# Patient Record
Sex: FEMALE | Race: WHITE | Employment: OTHER | URBAN - METROPOLITAN AREA
[De-identification: names, ages, dates, MRNs, and addresses within clinical notes are randomized per-mention and may not be internally consistent; named-entity substitution may affect disease eponyms.]

---

## 2019-04-10 ENCOUNTER — OFFICE VISIT (OUTPATIENT)
Dept: ORTHOPEDIC SURGERY | Age: 84
End: 2019-04-10
Payer: MEDICARE

## 2019-04-10 VITALS — RESPIRATION RATE: 15 BRPM | HEIGHT: 65 IN | BODY MASS INDEX: 26.99 KG/M2 | WEIGHT: 162 LBS

## 2019-04-10 DIAGNOSIS — M79.642 PAIN IN BOTH HANDS: Primary | ICD-10-CM

## 2019-04-10 DIAGNOSIS — M79.641 PAIN IN BOTH HANDS: Primary | ICD-10-CM

## 2019-04-10 DIAGNOSIS — R20.0 BILATERAL HAND NUMBNESS: ICD-10-CM

## 2019-04-10 PROCEDURE — L3908 WHO COCK-UP NONMOLDE PRE OTS: HCPCS | Performed by: ORTHOPAEDIC SURGERY

## 2019-04-10 PROCEDURE — 1123F ACP DISCUSS/DSCN MKR DOCD: CPT | Performed by: ORTHOPAEDIC SURGERY

## 2019-04-10 PROCEDURE — 99203 OFFICE O/P NEW LOW 30 MIN: CPT | Performed by: ORTHOPAEDIC SURGERY

## 2019-04-10 PROCEDURE — 1090F PRES/ABSN URINE INCON ASSESS: CPT | Performed by: ORTHOPAEDIC SURGERY

## 2019-04-10 PROCEDURE — G8400 PT W/DXA NO RESULTS DOC: HCPCS | Performed by: ORTHOPAEDIC SURGERY

## 2019-04-10 PROCEDURE — G8419 CALC BMI OUT NRM PARAM NOF/U: HCPCS | Performed by: ORTHOPAEDIC SURGERY

## 2019-04-10 PROCEDURE — 4004F PT TOBACCO SCREEN RCVD TLK: CPT | Performed by: ORTHOPAEDIC SURGERY

## 2019-04-10 PROCEDURE — 4040F PNEUMOC VAC/ADMIN/RCVD: CPT | Performed by: ORTHOPAEDIC SURGERY

## 2019-04-10 PROCEDURE — G8427 DOCREV CUR MEDS BY ELIG CLIN: HCPCS | Performed by: ORTHOPAEDIC SURGERY

## 2019-04-10 RX ORDER — ROSUVASTATIN CALCIUM 20 MG/1
TABLET, COATED ORAL
Refills: 3 | COMMUNITY
Start: 2019-03-27

## 2019-04-10 RX ORDER — TIZANIDINE 2 MG/1
TABLET ORAL
Refills: 0 | COMMUNITY
Start: 2019-03-20 | End: 2021-05-10

## 2019-04-10 RX ORDER — OLMESARTAN MEDOXOMIL, AMLODIPINE AND HYDROCHLOROTHIAZIDE TABLET 40/5/25 MG 40; 5; 25 MG/1; MG/1; MG/1
TABLET ORAL DAILY
COMMUNITY
Start: 2019-04-07

## 2019-04-10 RX ORDER — AMOXICILLIN AND CLAVULANATE POTASSIUM 250; 62.5 MG/5ML; MG/5ML
POWDER, FOR SUSPENSION ORAL
COMMUNITY
End: 2019-09-25

## 2019-04-10 RX ORDER — CETIRIZINE HYDROCHLORIDE 10 MG/1
TABLET ORAL
COMMUNITY

## 2019-04-10 RX ORDER — PRAVASTATIN SODIUM 80 MG/1
80 TABLET ORAL
COMMUNITY
Start: 2014-02-18 | End: 2021-05-10

## 2019-04-10 RX ORDER — CYCLOSPORINE 0.5 MG/ML
EMULSION OPHTHALMIC
Refills: 5 | COMMUNITY
Start: 2019-03-15 | End: 2021-05-10

## 2019-04-10 RX ORDER — RANITIDINE 150 MG/1
TABLET ORAL
COMMUNITY
Start: 2017-11-29 | End: 2021-05-10

## 2019-04-19 ENCOUNTER — OFFICE VISIT (OUTPATIENT)
Dept: ORTHOPEDIC SURGERY | Age: 84
End: 2019-04-19
Payer: MEDICARE

## 2019-04-19 DIAGNOSIS — G56.03 BILATERAL CARPAL TUNNEL SYNDROME: Primary | ICD-10-CM

## 2019-04-19 DIAGNOSIS — G56.23 ULNAR NEUROPATHY OF BOTH UPPER EXTREMITIES: ICD-10-CM

## 2019-04-19 PROCEDURE — 99999 PR OFFICE/OUTPT VISIT,PROCEDURE ONLY: CPT | Performed by: PHYSICAL MEDICINE & REHABILITATION

## 2019-04-19 PROCEDURE — 95886 MUSC TEST DONE W/N TEST COMP: CPT | Performed by: PHYSICAL MEDICINE & REHABILITATION

## 2019-04-19 PROCEDURE — 95910 NRV CNDJ TEST 7-8 STUDIES: CPT | Performed by: PHYSICAL MEDICINE & REHABILITATION

## 2019-04-19 NOTE — PROGRESS NOTES
[address] MEDICINE      Patient: [place] Age: 81 Years 7 Months  Sex: Female Date: 4/19/19  YOB: 1935 Ref. Phys.: Dr. Joy Bean  Notes:  r/o bilateral CTS, R>L n/t hands      Sensory NCS      Nerve / Sites Peak PeakAmp Dist Abdelrahman    ms µV cm m/s   L MEDIAN - D2 ULNAR D5   1. Median Wrist 3.70 18.6 14 50.9   2. Ulnar Wrist 3.70 15.1 14 51.9   R MEDIAN - D2 ULNAR D5   1. Median Wrist 4.05 14.0 14 47.5   2. Ulnar Wrist 3.85 15.6 14 41.2       Motor NCS      Nerve / Sites Lat Amp Amp Dist Abdelrahman    ms mV % cm m/s   L MEDIAN - APB   1. Wrist 3.30 5.5 100 8    2. Elbow 7.80 4.9 89.4 25 55.6   R MEDIAN - APB   1. Wrist 3.45 9.0 100 8    2. Elbow 7.90 8.7 97.2 26 58.4   L ULNAR - ADM   1. Wrist 3.10 8.5 100 8    2. B. Elbow 6.85 8.9 104 21 56.0   3. A. Elbow 8.55 8.8 103 10 58.8   R ULNAR - ADM   1. Wrist 3.10 11.1 100 8    2. B. Elbow 6.65 10.6 95.5 22 62.0   3. A. Elbow 9.10 9.4 84.6 10 40.8       EMG Summary Table     Spontaneous MUAP Recruit. Ins. Act Fibs. PSW Fasics. H.F. Amp. Dur. Poly's. Pattern   L. FIRST D INTEROSS N None None None None N N N N   R. FIRST D INTEROSS N None None None None N N N N   R. ABD POLL BREVIS N None None None None + + N N   R. BICEPS N None None None None N N N N   R. TRICEPS N None None None None N N N N   R. EXT CARPI R BREV N None None None None N N N N   R. EXT DIG COMM N None None None None N N N N   R. PRON TERES N None None None None N N N N   L. BICEPS N None None None None N N N N   L. TRICEPS N None None None None N N N N   L. EXT CARPI R BREV N None None None None N N N N   L. EXT DIG COMM N None None None None N N N N   L. PRON TERES N None None None None N N N N       Summary: Right median SNAP latency is slowed across the wrist.  Right ulnar CMAP conduction velocity is slowed across the elbow. Monopolar exam shows large amplitude prolonged duration motor units to right APB.   The remainder of the nerve conduction studies and monopolar exam are normal, as recorded above. Impression: Abnormal examination. There is electrodiagnostic evidence of:    1. Mild chronic right median mononeuropathy around the wrist (carpal tunnel syndrome)  2. 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 No left median mononeuropathy around the wrist (carpal tunnel syndrome)  4.  No evidence of any other left or right upper extremity mononeuropathy, plexopathy, or radiculopathy            Kasandra Gonzalez MD

## 2019-04-29 ENCOUNTER — OFFICE VISIT (OUTPATIENT)
Dept: ORTHOPEDIC SURGERY | Age: 84
End: 2019-04-29
Payer: MEDICARE

## 2019-04-29 VITALS — BODY MASS INDEX: 27.32 KG/M2 | HEIGHT: 65 IN | WEIGHT: 164 LBS | RESPIRATION RATE: 16 BRPM

## 2019-04-29 DIAGNOSIS — G56.01 CARPAL TUNNEL SYNDROME ON RIGHT: Primary | ICD-10-CM

## 2019-04-29 DIAGNOSIS — G56.02 CARPAL TUNNEL SYNDROME ON LEFT: ICD-10-CM

## 2019-04-29 DIAGNOSIS — G56.23 ULNAR NEUROPATHY OF BOTH UPPER EXTREMITIES: ICD-10-CM

## 2019-04-29 PROCEDURE — 4040F PNEUMOC VAC/ADMIN/RCVD: CPT | Performed by: ORTHOPAEDIC SURGERY

## 2019-04-29 PROCEDURE — 1090F PRES/ABSN URINE INCON ASSESS: CPT | Performed by: ORTHOPAEDIC SURGERY

## 2019-04-29 PROCEDURE — G8419 CALC BMI OUT NRM PARAM NOF/U: HCPCS | Performed by: ORTHOPAEDIC SURGERY

## 2019-04-29 PROCEDURE — 1123F ACP DISCUSS/DSCN MKR DOCD: CPT | Performed by: ORTHOPAEDIC SURGERY

## 2019-04-29 PROCEDURE — G8427 DOCREV CUR MEDS BY ELIG CLIN: HCPCS | Performed by: ORTHOPAEDIC SURGERY

## 2019-04-29 PROCEDURE — 20526 THER INJECTION CARP TUNNEL: CPT | Performed by: ORTHOPAEDIC SURGERY

## 2019-04-29 PROCEDURE — 99213 OFFICE O/P EST LOW 20 MIN: CPT | Performed by: ORTHOPAEDIC SURGERY

## 2019-04-29 PROCEDURE — 4004F PT TOBACCO SCREEN RCVD TLK: CPT | Performed by: ORTHOPAEDIC SURGERY

## 2019-04-29 PROCEDURE — G8400 PT W/DXA NO RESULTS DOC: HCPCS | Performed by: ORTHOPAEDIC SURGERY

## 2019-04-29 NOTE — PROGRESS NOTES
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 On the left side her EMGs are still normal    Treatment Plan: Both diagnostically as well as therapeutically we have elected to give her steroid injection into her right carpal tunnel today and she is going to keep track of how much this helps her symptoms    Return in about 1 month (around 5/27/2019). History of Present Illness  Stanton Salcedo is a 81 y.o. female. Location:  Bilateral hand numbness right significantly greater than left Severity: Particularly at night she wakes up with numbness Duration: 2-3 months  Modifying factors: Wearing the brace helps a little bit Associated symptoms: She does have known polyneuropathy    Review of Systems  Pertinent items are noted in HPI  Denies fever chills, confusion and bowel and bladder active change  Complete Review of Systems reviewed from patient history form dated 4/10/19 and available in the patients chart under the media tab. Vital Signs  Vitals:    04/29/19 0950   Resp: 16   Weight: 164 lb (74.4 kg)   Height: 5' 5\" (1.651 m)     Body mass index is 27.29 kg/m². Medical History  No past medical history on file. Current Outpatient Medications on File Prior to Visit   Medication Sig Dispense Refill    tiZANidine (ZANAFLEX) 2 MG tablet TK 1 T PO BID PRN  0    rosuvastatin (CRESTOR) 20 MG tablet TK 1 T PO QPM  3    ranitidine (ZANTAC) 150 MG tablet ranitidine 150 mg tablet   Take 1 tablet every day by oral route.  pravastatin (PRAVACHOL) 80 MG tablet Take 80 mg by mouth      olmesartan-amLODIPine-HCTZ 40-5-25 MG TABS       RESTASIS 0.05 % ophthalmic emulsion INT 1 GTT IN OU BID  5    cetirizine (ZYRTEC ALLERGY) 10 MG tablet Zyrtec 10 mg tablet   Take 1 tablet every day by oral route.  Ascorbic Acid 300 MG CHEW Vitamin C 300 mg chewable tablet   Take 1 tablet every day by oral route.  amoxicillin-clavulanate (AUGMENTIN) 250-62.5 MG/5ML suspension amoxicillin 250 mg-potassium clavulanate 62.5 mg/5 mL oral suspension   TAKE 25 ML BY ORAL ROUTE ONCE. DISCARD REMAINDER       No current facility-administered medications on file prior to visit. Allergies   Allergen Reactions    Atenolol      Stomach discomfort    Bisoprolol      Stomach problems    Erythromycin      Pt doesn't remember    Levofloxacin      Can't remember    Penicillins     Sulfa Antibiotics      Pt doesn't remember      Verapamil      Can't remember     [unfilled]  No family history on file. Physical Exam  Constitutional:  Patient is well-nourished and demonstrates normal hygiene. Mental Status:  Patient is alert and oriented to person, place and time. Skin:  Intact, no rashes or lesions. Lymphatic: No supraclavicular or cervical adenopathy. Hand Examination: Right neurologic exam:Tinel sign is negative in the supraclavicular fossa, mid brachium, cubital tunnel, pronator teres regions. It is positive in the carpal tunnel and Phalen's test is positive. Left neurologic exam: Mildly positive Tinel's and Phalen's at the wrist level. Additional Comments:     Additional Examinations:  X-Ray Findings:    Additional Diagnostic Test Findings: EMG nerve conduction studies were reviewed and they do document mild right carpal tunnel syndrome as well as mild right ulnar neuropathy on the left they are normal    Office Procedures: The right carpal tunnel was injected today with 1-1/2 mL of Celestone and Xylocaine. The wrist was prepped with alcohol and under sterile technique an injection was made ulnar to the palmaris longus tendon just proximal to the wrist flexor crease.   Patient tolerated the procedure well      I spent 15 minutes, face to face, and greater than 50% was spent with the patient discussing treatment options and answering questions regarding carpal tunnel syndrome pathology and its superimposition upon    This dictation was performed with a verbal recognition program. It is possible that there are still dictated errors within this office note. All efforts were made to ensure that this office note is accurate.     Orders Placed This Encounter   Procedures    RI INJECT CARPAL TUNNEL    RI BETAMETHASONE ACET&SOD PHOSP

## 2019-05-29 ENCOUNTER — OFFICE VISIT (OUTPATIENT)
Dept: ORTHOPEDIC SURGERY | Age: 84
End: 2019-05-29
Payer: MEDICARE

## 2019-05-29 VITALS — HEIGHT: 65 IN | RESPIRATION RATE: 15 BRPM | BODY MASS INDEX: 27.33 KG/M2 | WEIGHT: 164.02 LBS

## 2019-05-29 DIAGNOSIS — G56.01 CARPAL TUNNEL SYNDROME ON RIGHT: ICD-10-CM

## 2019-05-29 DIAGNOSIS — G56.23 ULNAR NEUROPATHY OF BOTH UPPER EXTREMITIES: Primary | ICD-10-CM

## 2019-05-29 PROBLEM — G89.29 CHRONIC HEADACHES: Status: ACTIVE | Noted: 2019-05-29

## 2019-05-29 PROBLEM — R51.9 CHRONIC HEADACHES: Status: ACTIVE | Noted: 2019-05-29

## 2019-05-29 PROBLEM — E78.00 HYPERCHOLESTEROLEMIA: Status: ACTIVE | Noted: 2019-05-29

## 2019-05-29 PROBLEM — I10 HYPERTENSION: Status: ACTIVE | Noted: 2019-05-29

## 2019-05-29 PROBLEM — K21.9 GERD (GASTROESOPHAGEAL REFLUX DISEASE): Status: ACTIVE | Noted: 2019-05-29

## 2019-05-29 PROCEDURE — 4004F PT TOBACCO SCREEN RCVD TLK: CPT | Performed by: ORTHOPAEDIC SURGERY

## 2019-05-29 PROCEDURE — G8599 NO ASA/ANTIPLAT THER USE RNG: HCPCS | Performed by: ORTHOPAEDIC SURGERY

## 2019-05-29 PROCEDURE — G8419 CALC BMI OUT NRM PARAM NOF/U: HCPCS | Performed by: ORTHOPAEDIC SURGERY

## 2019-05-29 PROCEDURE — G8427 DOCREV CUR MEDS BY ELIG CLIN: HCPCS | Performed by: ORTHOPAEDIC SURGERY

## 2019-05-29 PROCEDURE — 1123F ACP DISCUSS/DSCN MKR DOCD: CPT | Performed by: ORTHOPAEDIC SURGERY

## 2019-05-29 PROCEDURE — G8400 PT W/DXA NO RESULTS DOC: HCPCS | Performed by: ORTHOPAEDIC SURGERY

## 2019-05-29 PROCEDURE — 99212 OFFICE O/P EST SF 10 MIN: CPT | Performed by: ORTHOPAEDIC SURGERY

## 2019-05-29 PROCEDURE — 4040F PNEUMOC VAC/ADMIN/RCVD: CPT | Performed by: ORTHOPAEDIC SURGERY

## 2019-05-29 PROCEDURE — 1090F PRES/ABSN URINE INCON ASSESS: CPT | Performed by: ORTHOPAEDIC SURGERY

## 2019-05-29 NOTE — PROGRESS NOTES
Assessment: Nearly 100% resolution of symptoms after steroid injection and brace for carpal tunnel syndrome. Her EMGs did show combination of carpal tunnel ulnar neuropathy and peripheral neuropathy and so the injection was given partly to see how much improvement she would get    Treatment Plan: Injection would be a strong indicator that surgery would eliminate the vast majority of her symptoms    Return if symptoms worsen or fail to improve. History of Present Illness  Stanton Salcedo is a 81 y.o. female. Location:  RT HAND CTS Severity: NO SYMPTOMS SINCE THE INJECTION Duration: SEVERAL YEARS  Modifying factors: STEROID INJECTION ON 4/29/19  Associated symptoms: NUMBNESS/TINGLING. INJECTIONS HAS HELPED IMPROVE HER SYMPTOMS %. Review of Systems  Pertinent items are noted in HPI  Denies fever chills, confusion and bowel and bladder active change  Complete Review of Systems reviewed from patient history form dated 4/10/2019 and available in the patients chart under the media tab. Vital Signs  Vitals:    05/29/19 1008   Resp: 15   Weight: 164 lb 0.4 oz (74.4 kg)   Height: 5' 5\" (1.651 m)     Body mass index is 27.29 kg/m². Contributory History  NONE    Medical History  History reviewed. No pertinent past medical history. Current Outpatient Medications on File Prior to Visit   Medication Sig Dispense Refill    nystatin (MYCOSTATIN) 791428 UNIT/ML suspension Take 5 mLs by mouth      tiZANidine (ZANAFLEX) 2 MG tablet TK 1 T PO BID PRN  0    rosuvastatin (CRESTOR) 20 MG tablet TK 1 T PO QPM  3    ranitidine (ZANTAC) 150 MG tablet ranitidine 150 mg tablet   Take 1 tablet every day by oral route.  pravastatin (PRAVACHOL) 80 MG tablet Take 80 mg by mouth      olmesartan-amLODIPine-HCTZ 40-5-25 MG TABS       RESTASIS 0.05 % ophthalmic emulsion INT 1 GTT IN OU BID  5    cetirizine (ZYRTEC ALLERGY) 10 MG tablet Zyrtec 10 mg tablet   Take 1 tablet every day by oral route.       Ascorbic Acid 300 MG CHEW Vitamin C 300 mg chewable tablet   Take 1 tablet every day by oral route.  amoxicillin-clavulanate (AUGMENTIN) 250-62.5 MG/5ML suspension amoxicillin 250 mg-potassium clavulanate 62.5 mg/5 mL oral suspension   TAKE 25 ML BY ORAL ROUTE ONCE. DISCARD REMAINDER       No current facility-administered medications on file prior to visit.       Allergies   Allergen Reactions    Atenolol      Stomach discomfort    Bisoprolol      Stomach problems    Erythromycin      Pt doesn't remember    Levofloxacin      Can't remember    Penicillins     Sulfa Antibiotics      Pt doesn't remember      Verapamil      Can't remember     Social History     Socioeconomic History    Marital status:      Spouse name: Not on file    Number of children: Not on file    Years of education: Not on file    Highest education level: Not on file   Occupational History    Not on file   Social Needs    Financial resource strain: Not on file    Food insecurity:     Worry: Not on file     Inability: Not on file    Transportation needs:     Medical: Not on file     Non-medical: Not on file   Tobacco Use    Smoking status: Never Smoker    Smokeless tobacco: Never Used   Substance and Sexual Activity    Alcohol use: Not on file    Drug use: Not on file    Sexual activity: Not on file   Lifestyle    Physical activity:     Days per week: Not on file     Minutes per session: Not on file    Stress: Not on file   Relationships    Social connections:     Talks on phone: Not on file     Gets together: Not on file     Attends Nondenominational service: Not on file     Active member of club or organization: Not on file     Attends meetings of clubs or organizations: Not on file     Relationship status: Not on file    Intimate partner violence:     Fear of current or ex partner: Not on file     Emotionally abused: Not on file     Physically abused: Not on file     Forced sexual activity: Not on file   Other Topics Concern    Not on file   Social History Narrative    Not on file     History reviewed. No pertinent family history. Physical Exam  Constitutional:  Normal nutrition  Mental Status:  Normal without any dementia  Skin:  No rashes or erythema  Lymphatic: No lymphadenopathy    Hand Examination: Examination of the right hand now shows just very minimally positive Phalen's negative Tinel's sign over the median nerve at the carpal tunnel. Minimally positive Tinel sign over the ulnar nerve at the cubital tunnel. Ulnar intrinsic motor function is normal.    Median intrinsic motor function is normal    Additional Comments:     Additional Examinations:  X-Ray Findings:    Additional Diagnostic Test Findings:    Office Procedures:    I spent 10 minutes, face to face, and greater than 50% was spent with the patient discussing treatment options and answering questions regarding operative indications for carpal tunnel syndrome    [unfilled]    No orders of the defined types were placed in this encounter. Attestation: I have reviewed the chief complaint and history of present illness (including ROS and PFSH) and vital documentation by my staff and I agree with their documentation and have added where applicable.

## 2019-09-18 ENCOUNTER — OFFICE VISIT (OUTPATIENT)
Dept: ORTHOPEDIC SURGERY | Age: 84
End: 2019-09-18
Payer: MEDICARE

## 2019-09-18 VITALS — BODY MASS INDEX: 27.33 KG/M2 | HEIGHT: 65 IN | WEIGHT: 164.02 LBS | RESPIRATION RATE: 15 BRPM

## 2019-09-18 DIAGNOSIS — G56.23 ULNAR NEUROPATHY OF BOTH UPPER EXTREMITIES: Primary | ICD-10-CM

## 2019-09-18 DIAGNOSIS — G56.01 CARPAL TUNNEL SYNDROME ON RIGHT: ICD-10-CM

## 2019-09-18 PROCEDURE — 1036F TOBACCO NON-USER: CPT | Performed by: ORTHOPAEDIC SURGERY

## 2019-09-18 PROCEDURE — G8419 CALC BMI OUT NRM PARAM NOF/U: HCPCS | Performed by: ORTHOPAEDIC SURGERY

## 2019-09-18 PROCEDURE — 4040F PNEUMOC VAC/ADMIN/RCVD: CPT | Performed by: ORTHOPAEDIC SURGERY

## 2019-09-18 PROCEDURE — 99213 OFFICE O/P EST LOW 20 MIN: CPT | Performed by: ORTHOPAEDIC SURGERY

## 2019-09-18 PROCEDURE — 1123F ACP DISCUSS/DSCN MKR DOCD: CPT | Performed by: ORTHOPAEDIC SURGERY

## 2019-09-18 PROCEDURE — G8427 DOCREV CUR MEDS BY ELIG CLIN: HCPCS | Performed by: ORTHOPAEDIC SURGERY

## 2019-09-18 PROCEDURE — 1090F PRES/ABSN URINE INCON ASSESS: CPT | Performed by: ORTHOPAEDIC SURGERY

## 2019-09-18 PROCEDURE — G8599 NO ASA/ANTIPLAT THER USE RNG: HCPCS | Performed by: ORTHOPAEDIC SURGERY

## 2019-09-18 PROCEDURE — G8400 PT W/DXA NO RESULTS DOC: HCPCS | Performed by: ORTHOPAEDIC SURGERY

## 2019-09-18 NOTE — PROGRESS NOTES
Assessment: EMG documented carpal tunnel syndrome but superimposed on a peripheral neuropathy and she also has EMG evidence of mild ulnar nerve symptoms. However steroid injection back in April gave her about 100% resolution of symptoms but her symptoms have now recurred probably slightly worse than they were before    Treatment Plan: We discussed risks and benefits of going ahead with a right carpal tunnel release. I think she is in overall very good medical health for 81years of age and should tolerate the procedure well. She understands that she has multifactorial problem but her response to steroid injection would definitely indicate a positive response to surgery    Return for post op. History of Present Illness  Inocencio Jeter is a 81 y.o. female. She is back for her right carpal tunnel syndrome. The steroid injection from April lasted up until the past 2-3 weeks. Her fingers are very numb and are waking her up several times throughout the night. Location:  Left hand Severity: numbness and tingling Duration: came back the past 2-3 weeks  Modifying factors: last steroid injection in April Associated symptoms: associated numbness and tingling. Review of Systems  Pertinent items are noted in HPI  Denies fever chills, confusion and bowel and bladder active change  Complete Review of Systems reviewed from patient history form dated 04/10/2019 and available in the patients chart under the media tab. Vital Signs  Vitals:    09/18/19 1040   Resp: 15   Weight: 164 lb 0.4 oz (74.4 kg)   Height: 5' 5\" (1.651 m)     Body mass index is 27.29 kg/m². Contributory History  None    Medical History  History reviewed. No pertinent past medical history.   Current Outpatient Medications on File Prior to Visit   Medication Sig Dispense Refill    nystatin (MYCOSTATIN) 602231 UNIT/ML suspension Take 5 mLs by mouth      tiZANidine (ZANAFLEX) 2 MG tablet TK 1 T PO BID PRN  0    rosuvastatin (CRESTOR) 20 MG together: Not on file     Attends Scientologist service: Not on file     Active member of club or organization: Not on file     Attends meetings of clubs or organizations: Not on file     Relationship status: Not on file    Intimate partner violence:     Fear of current or ex partner: Not on file     Emotionally abused: Not on file     Physically abused: Not on file     Forced sexual activity: Not on file   Other Topics Concern    Not on file   Social History Narrative    Not on file     History reviewed. No pertinent family history. Physical Exam  Constitutional: Normal nutritional status  Mental Status: Alert and oriented  Skin: No rashes or erythema  Lymphatic: No lymphadenopathy    Hand Examination: Neurologic exam right: Tinel sign is negative in the supraclavicular fossa, mid brachium, cubital tunnel, pronator teres regions. It is positive in the carpal tunnel and Phalen's test is positive. Examination of the neck: No significant neck pain or tenderness. She does have trigger points in her right trapezius    Additional Comments:     Additional Examinations:  X-Ray Findings:    Additional Diagnostic Test Findings: Previous EMGs were reviewed and described above    Office Procedures: Informed consent and scheduling for carpal tunnel release was performed    Time Statement:I spent 15 minutes, face to face, and greater than 50% was spent counseling with the patient discussing treatment options and answering questions regarding the combination of neuropathic changes and carpal tunnel syndrome. We also discussed however her response to her previous injection and the fact that this strongly indicates a potentially good result from surgical intervention. We also reviewed her medical history at length today to be certain that she was a good candidate for surgery    This dictation was performed with a verbal recognition program. It is possible that there are still dictated errors within this office note.   All efforts were made to ensure that this office note is accurate. No orders of the defined types were placed in this encounter. Attestation: I have reviewed the chief complaint and history of present illness (including ROS and PFSH) and vital documentation by my staff and I agree with their documentation and have added where applicable.

## 2019-09-27 ENCOUNTER — TELEPHONE (OUTPATIENT)
Dept: ORTHOPEDIC SURGERY | Age: 84
End: 2019-09-27

## 2019-09-27 NOTE — TELEPHONE ENCOUNTER
[facility] [id number]    Date: 10/1/19  Type of SX:  Outpatient  Location: [facility]  CPT: 78103   SX area: RT hand

## 2019-09-30 ENCOUNTER — ANESTHESIA EVENT (OUTPATIENT)
Dept: OPERATING ROOM | Age: 84
End: 2019-09-30
Payer: MEDICARE

## 2019-09-30 ASSESSMENT — LIFESTYLE VARIABLES: SMOKING_STATUS: 0

## 2019-10-01 ENCOUNTER — ANESTHESIA (OUTPATIENT)
Dept: OPERATING ROOM | Age: 84
End: 2019-10-01
Payer: MEDICARE

## 2019-10-01 ENCOUNTER — HOSPITAL ENCOUNTER (OUTPATIENT)
Age: 84
Setting detail: OUTPATIENT SURGERY
Discharge: HOME | End: 2019-10-01
Attending: ORTHOPAEDIC SURGERY | Admitting: ORTHOPAEDIC SURGERY
Payer: MEDICARE

## 2019-10-01 VITALS
DIASTOLIC BLOOD PRESSURE: 45 MMHG | OXYGEN SATURATION: 99 % | RESPIRATION RATE: 11 BRPM | SYSTOLIC BLOOD PRESSURE: 108 MMHG

## 2019-10-01 VITALS
OXYGEN SATURATION: 100 % | BODY MASS INDEX: 27.31 KG/M2 | WEIGHT: 160 LBS | RESPIRATION RATE: 15 BRPM | SYSTOLIC BLOOD PRESSURE: 158 MMHG | TEMPERATURE: 98.2 F | HEIGHT: 64 IN | HEART RATE: 71 BPM | DIASTOLIC BLOOD PRESSURE: 62 MMHG

## 2019-10-01 PROBLEM — G56.01 RIGHT CARPAL TUNNEL SYNDROME: Status: ACTIVE | Noted: 2019-10-01

## 2019-10-01 PROCEDURE — 7100000011 HC PHASE II RECOVERY - ADDTL 15 MIN: Performed by: ORTHOPAEDIC SURGERY

## 2019-10-01 PROCEDURE — 2580000003 HC RX 258: Performed by: ANESTHESIOLOGY

## 2019-10-01 PROCEDURE — 2500000003 HC RX 250 WO HCPCS: Performed by: NURSE ANESTHETIST, CERTIFIED REGISTERED

## 2019-10-01 PROCEDURE — 7100000010 HC PHASE II RECOVERY - FIRST 15 MIN: Performed by: ORTHOPAEDIC SURGERY

## 2019-10-01 PROCEDURE — 3700000000 HC ANESTHESIA ATTENDED CARE: Performed by: ORTHOPAEDIC SURGERY

## 2019-10-01 PROCEDURE — 3700000001 HC ADD 15 MINUTES (ANESTHESIA): Performed by: ORTHOPAEDIC SURGERY

## 2019-10-01 PROCEDURE — 2709999900 HC NON-CHARGEABLE SUPPLY: Performed by: ORTHOPAEDIC SURGERY

## 2019-10-01 PROCEDURE — 2500000003 HC RX 250 WO HCPCS: Performed by: ORTHOPAEDIC SURGERY

## 2019-10-01 PROCEDURE — 3600000004 HC SURGERY LEVEL 4 BASE: Performed by: ORTHOPAEDIC SURGERY

## 2019-10-01 PROCEDURE — 6360000002 HC RX W HCPCS: Performed by: NURSE ANESTHETIST, CERTIFIED REGISTERED

## 2019-10-01 PROCEDURE — 7100000000 HC PACU RECOVERY - FIRST 15 MIN: Performed by: ORTHOPAEDIC SURGERY

## 2019-10-01 PROCEDURE — 3600000014 HC SURGERY LEVEL 4 ADDTL 15MIN: Performed by: ORTHOPAEDIC SURGERY

## 2019-10-01 RX ORDER — MORPHINE SULFATE 2 MG/ML
1 INJECTION, SOLUTION INTRAMUSCULAR; INTRAVENOUS EVERY 5 MIN PRN
Status: DISCONTINUED | OUTPATIENT
Start: 2019-10-01 | End: 2019-10-01 | Stop reason: HOSPADM

## 2019-10-01 RX ORDER — OXYCODONE HYDROCHLORIDE AND ACETAMINOPHEN 5; 325 MG/1; MG/1
1 TABLET ORAL PRN
Status: DISCONTINUED | OUTPATIENT
Start: 2019-10-01 | End: 2019-10-01 | Stop reason: HOSPADM

## 2019-10-01 RX ORDER — PROPOFOL 10 MG/ML
INJECTION, EMULSION INTRAVENOUS PRN
Status: DISCONTINUED | OUTPATIENT
Start: 2019-10-01 | End: 2019-10-01 | Stop reason: SDUPTHER

## 2019-10-01 RX ORDER — MEPERIDINE HYDROCHLORIDE 50 MG/ML
12.5 INJECTION INTRAMUSCULAR; INTRAVENOUS; SUBCUTANEOUS EVERY 5 MIN PRN
Status: DISCONTINUED | OUTPATIENT
Start: 2019-10-01 | End: 2019-10-01 | Stop reason: HOSPADM

## 2019-10-01 RX ORDER — OXYCODONE HYDROCHLORIDE AND ACETAMINOPHEN 5; 325 MG/1; MG/1
2 TABLET ORAL PRN
Status: DISCONTINUED | OUTPATIENT
Start: 2019-10-01 | End: 2019-10-01 | Stop reason: HOSPADM

## 2019-10-01 RX ORDER — LIDOCAINE HYDROCHLORIDE 10 MG/ML
0.3 INJECTION, SOLUTION EPIDURAL; INFILTRATION; INTRACAUDAL; PERINEURAL
Status: DISCONTINUED | OUTPATIENT
Start: 2019-10-01 | End: 2019-10-01 | Stop reason: HOSPADM

## 2019-10-01 RX ORDER — SODIUM CHLORIDE, SODIUM LACTATE, POTASSIUM CHLORIDE, CALCIUM CHLORIDE 600; 310; 30; 20 MG/100ML; MG/100ML; MG/100ML; MG/100ML
INJECTION, SOLUTION INTRAVENOUS CONTINUOUS
Status: DISCONTINUED | OUTPATIENT
Start: 2019-10-01 | End: 2019-10-01 | Stop reason: HOSPADM

## 2019-10-01 RX ORDER — LIDOCAINE HYDROCHLORIDE 20 MG/ML
INJECTION, SOLUTION INFILTRATION; PERINEURAL PRN
Status: DISCONTINUED | OUTPATIENT
Start: 2019-10-01 | End: 2019-10-01 | Stop reason: SDUPTHER

## 2019-10-01 RX ORDER — BUPIVACAINE HYDROCHLORIDE 5 MG/ML
INJECTION, SOLUTION EPIDURAL; INTRACAUDAL PRN
Status: DISCONTINUED | OUTPATIENT
Start: 2019-10-01 | End: 2019-10-01 | Stop reason: ALTCHOICE

## 2019-10-01 RX ORDER — SODIUM CHLORIDE 0.9 % (FLUSH) 0.9 %
10 SYRINGE (ML) INJECTION EVERY 12 HOURS SCHEDULED
Status: DISCONTINUED | OUTPATIENT
Start: 2019-10-01 | End: 2019-10-01 | Stop reason: HOSPADM

## 2019-10-01 RX ORDER — FENTANYL CITRATE 50 UG/ML
INJECTION, SOLUTION INTRAMUSCULAR; INTRAVENOUS PRN
Status: DISCONTINUED | OUTPATIENT
Start: 2019-10-01 | End: 2019-10-01 | Stop reason: SDUPTHER

## 2019-10-01 RX ORDER — SODIUM CHLORIDE 0.9 % (FLUSH) 0.9 %
10 SYRINGE (ML) INJECTION PRN
Status: DISCONTINUED | OUTPATIENT
Start: 2019-10-01 | End: 2019-10-01 | Stop reason: HOSPADM

## 2019-10-01 RX ORDER — DEXAMETHASONE SODIUM PHOSPHATE 10 MG/ML
INJECTION INTRAMUSCULAR; INTRAVENOUS PRN
Status: DISCONTINUED | OUTPATIENT
Start: 2019-10-01 | End: 2019-10-01 | Stop reason: SDUPTHER

## 2019-10-01 RX ORDER — ONDANSETRON 2 MG/ML
4 INJECTION INTRAMUSCULAR; INTRAVENOUS
Status: DISCONTINUED | OUTPATIENT
Start: 2019-10-01 | End: 2019-10-01 | Stop reason: HOSPADM

## 2019-10-01 RX ORDER — MORPHINE SULFATE 2 MG/ML
2 INJECTION, SOLUTION INTRAMUSCULAR; INTRAVENOUS EVERY 5 MIN PRN
Status: DISCONTINUED | OUTPATIENT
Start: 2019-10-01 | End: 2019-10-01 | Stop reason: HOSPADM

## 2019-10-01 RX ORDER — ONDANSETRON 2 MG/ML
INJECTION INTRAMUSCULAR; INTRAVENOUS PRN
Status: DISCONTINUED | OUTPATIENT
Start: 2019-10-01 | End: 2019-10-01 | Stop reason: SDUPTHER

## 2019-10-01 RX ADMIN — ONDANSETRON 4 MG: 2 INJECTION INTRAMUSCULAR; INTRAVENOUS at 10:23

## 2019-10-01 RX ADMIN — LIDOCAINE HYDROCHLORIDE 60 MG: 20 INJECTION, SOLUTION INFILTRATION; PERINEURAL at 10:17

## 2019-10-01 RX ADMIN — PROPOFOL 130 MG: 10 INJECTION, EMULSION INTRAVENOUS at 10:17

## 2019-10-01 RX ADMIN — DEXAMETHASONE SODIUM PHOSPHATE 10 MG: 10 INJECTION INTRAMUSCULAR; INTRAVENOUS at 10:23

## 2019-10-01 RX ADMIN — FENTANYL CITRATE 50 MCG: 50 INJECTION INTRAMUSCULAR; INTRAVENOUS at 10:23

## 2019-10-01 RX ADMIN — SODIUM CHLORIDE, POTASSIUM CHLORIDE, SODIUM LACTATE AND CALCIUM CHLORIDE: 600; 310; 30; 20 INJECTION, SOLUTION INTRAVENOUS at 09:01

## 2019-10-01 ASSESSMENT — PULMONARY FUNCTION TESTS
PIF_VALUE: 1
PIF_VALUE: 2
PIF_VALUE: 2
PIF_VALUE: 3
PIF_VALUE: 1
PIF_VALUE: 2
PIF_VALUE: 3
PIF_VALUE: 2
PIF_VALUE: 1
PIF_VALUE: 2
PIF_VALUE: 0
PIF_VALUE: 2
PIF_VALUE: 1
PIF_VALUE: 2
PIF_VALUE: 0
PIF_VALUE: 1
PIF_VALUE: 2
PIF_VALUE: 0

## 2019-10-01 ASSESSMENT — PAIN SCALES - GENERAL
PAINLEVEL_OUTOF10: 0

## 2019-10-01 ASSESSMENT — PAIN - FUNCTIONAL ASSESSMENT: PAIN_FUNCTIONAL_ASSESSMENT: 0-10

## 2019-10-01 ASSESSMENT — ENCOUNTER SYMPTOMS: SHORTNESS OF BREATH: 1

## 2019-10-09 ENCOUNTER — OFFICE VISIT (OUTPATIENT)
Dept: ORTHOPEDIC SURGERY | Age: 84
End: 2019-10-09

## 2019-10-09 DIAGNOSIS — G56.01 CARPAL TUNNEL SYNDROME ON RIGHT: Primary | ICD-10-CM

## 2019-10-09 PROCEDURE — 99024 POSTOP FOLLOW-UP VISIT: CPT | Performed by: ORTHOPAEDIC SURGERY

## 2020-01-12 ENCOUNTER — APPOINTMENT (OUTPATIENT)
Dept: GENERAL RADIOLOGY | Age: 85
End: 2020-01-12
Payer: MEDICARE

## 2020-01-12 ENCOUNTER — HOSPITAL ENCOUNTER (EMERGENCY)
Age: 85
Discharge: HOME | End: 2020-01-12
Attending: EMERGENCY MEDICINE
Payer: MEDICARE

## 2020-01-12 VITALS
OXYGEN SATURATION: 99 % | HEART RATE: 67 BPM | RESPIRATION RATE: 14 BRPM | WEIGHT: 157 LBS | HEIGHT: 65 IN | SYSTOLIC BLOOD PRESSURE: 147 MMHG | TEMPERATURE: 97.9 F | BODY MASS INDEX: 26.16 KG/M2 | DIASTOLIC BLOOD PRESSURE: 59 MMHG

## 2020-01-12 LAB
RAPID INFLUENZA  B AGN: NEGATIVE
RAPID INFLUENZA A AGN: NEGATIVE

## 2020-01-12 PROCEDURE — 71046 X-RAY EXAM CHEST 2 VIEWS: CPT

## 2020-01-12 PROCEDURE — 6370000000 HC RX 637 (ALT 250 FOR IP): Performed by: EMERGENCY MEDICINE

## 2020-01-12 PROCEDURE — 99283 EMERGENCY DEPT VISIT LOW MDM: CPT

## 2020-01-12 PROCEDURE — 87804 INFLUENZA ASSAY W/OPTIC: CPT

## 2020-01-12 RX ORDER — OXYMETAZOLINE HYDROCHLORIDE 0.05 G/100ML
2 SPRAY NASAL 2 TIMES DAILY
Qty: 1 BOTTLE | Refills: 0 | Status: SHIPPED | OUTPATIENT
Start: 2020-01-12 | End: 2020-01-15

## 2020-01-12 RX ORDER — ALBUTEROL SULFATE 90 UG/1
2 AEROSOL, METERED RESPIRATORY (INHALATION) EVERY 4 HOURS PRN
Qty: 1 INHALER | Refills: 0 | Status: SHIPPED | OUTPATIENT
Start: 2020-01-12 | End: 2021-05-10

## 2020-01-12 RX ORDER — DOXYCYCLINE HYCLATE 100 MG
100 TABLET ORAL
COMMUNITY
Start: 2020-01-10 | End: 2021-05-10

## 2020-01-12 RX ORDER — BENZONATATE 200 MG/1
200 CAPSULE ORAL 3 TIMES DAILY PRN
Qty: 21 CAPSULE | Refills: 0 | Status: SHIPPED | OUTPATIENT
Start: 2020-01-12 | End: 2020-01-19

## 2020-01-12 RX ORDER — FLUTICASONE PROPIONATE 50 MCG
1 SPRAY, SUSPENSION (ML) NASAL DAILY
Qty: 1 BOTTLE | Refills: 0 | Status: SHIPPED | OUTPATIENT
Start: 2020-01-12 | End: 2021-05-10

## 2020-01-12 RX ORDER — PREDNISONE 20 MG/1
40 TABLET ORAL ONCE
Status: COMPLETED | OUTPATIENT
Start: 2020-01-12 | End: 2020-01-12

## 2020-01-12 RX ORDER — BENZONATATE 100 MG/1
100 CAPSULE ORAL ONCE
Status: COMPLETED | OUTPATIENT
Start: 2020-01-12 | End: 2020-01-12

## 2020-01-12 RX ADMIN — PREDNISONE 40 MG: 20 TABLET ORAL at 08:13

## 2020-01-12 RX ADMIN — BENZONATATE 100 MG: 100 CAPSULE ORAL at 08:13

## 2020-01-12 ASSESSMENT — ENCOUNTER SYMPTOMS
SORE THROAT: 1
RHINORRHEA: 1
VOMITING: 0
SINUS PRESSURE: 1
EYE DISCHARGE: 0
SHORTNESS OF BREATH: 1
NAUSEA: 0
COUGH: 1
WHEEZING: 0
EYE REDNESS: 0
ABDOMINAL PAIN: 0
TROUBLE SWALLOWING: 0

## 2020-01-12 ASSESSMENT — PAIN DESCRIPTION - LOCATION: LOCATION: BACK

## 2020-01-12 ASSESSMENT — PAIN DESCRIPTION - DESCRIPTORS: DESCRIPTORS: ACHING

## 2020-01-12 ASSESSMENT — PAIN DESCRIPTION - PAIN TYPE: TYPE: ACUTE PAIN

## 2020-01-12 NOTE — ED NOTES
Pt ambulated to room from waiting room without difficulty with a steady gait.       Rudi Hines  01/12/20 0800

## 2020-01-12 NOTE — ED PROVIDER NOTES
doesn't remember      Verapamil      Can't remember       Past medical history:  has a past medical history of Back pain, GERD (gastroesophageal reflux disease), Hyperlipidemia, and Hypertension. Past surgical history:  has a past surgical history that includes Hysterectomy; Cholecystectomy; Cataract removal with implant (Bilateral); and Carpal tunnel release (Right, 10/1/2019). Home medications:   Prior to Admission medications    Medication Sig Start Date End Date Taking? Authorizing Provider   doxycycline hyclate (VIBRA-TABS) 100 MG tablet Take 100 mg by mouth 1/10/20  Yes Historical Provider, MD   tiZANidine (ZANAFLEX) 2 MG tablet TK 1 T PO BID PRN 3/20/19   Historical Provider, MD   rosuvastatin (CRESTOR) 20 MG tablet TK 1 T PO QPM 3/27/19   Historical Provider, MD   ranitidine (ZANTAC) 150 MG tablet ranitidine 150 mg tablet   Take 1 tablet every day by oral route. 11/29/17   Historical Provider, MD   pravastatin (PRAVACHOL) 80 MG tablet Take 80 mg by mouth 2/18/14   Historical Provider, MD   olmesartan-amLODIPine-HCTZ 40-5-25 MG TABS daily  4/7/19   Historical Provider, MD   RESTASIS 0.05 % ophthalmic emulsion INT 1 GTT IN OU BID 3/15/19   Historical Provider, MD   cetirizine (ZYRTEC ALLERGY) 10 MG tablet Zyrtec 10 mg tablet   Take 1 tablet every day by oral route. Historical Provider, MD   Ascorbic Acid 300 MG CHEW Vitamin C 300 mg chewable tablet   Take 1 tablet every day by oral route. Historical Provider, MD       Social history:  reports that she has never smoked. She has never used smokeless tobacco. She reports current alcohol use of about 3.0 standard drinks of alcohol per week. She reports that she does not use drugs.     Family history:    Family History   Problem Relation Age of Onset    Kidney Disease Father     Heart Attack Brother     Heart Disease Brother     Alzheimer's Disease Brother     Parkinsonism Brother        Exam  ED Triage Vitals [01/12/20 0757]   BP Temp Temp Source Retrocardiac air-fluid level, compatible with hiatal hernia. No confluent airspace disease. No pleural effusion or pneumothorax. Cardiac and mediastinal silhouettes are within normal limits. Calcification of aorta. Radiodensity projecting over the lumbar spine, incompletely image, potentially postoperative. Upper abdominal clips. No acute airspace disease. Hiatal hernia. Labs  Results for orders placed or performed during the hospital encounter of 01/12/20   Rapid influenza A/B antigens   Result Value Ref Range    Rapid Influenza A Ag Negative Negative    Rapid Influenza B Ag Negative Negative       - Patient seen and evaluated in room 6.  81 y.o. female presented for cough, congestion that is not improving despite taking amoxicillin and currently on day 2 of doxycycline. Exam showed well appearing 81 y.o. F without signs of systemic toxicity. No signs of meningitis. No abnormal vital signs to suggest sepsis. No labored breathing.  - Patient was placed on telemetry during his/her ED stay and no malignant dysrhythmia observed. - Pertinent old records reviewed. - rapid flu negative and CXR did not show pneumonia  - Diagnostic studies reviewed and results discussed with patient and family. - Patient was given tessalon perle and duoneb in the ED. Upon reassessment, patient reported feeling slightly better. - since patient already on doxycycline, she can finish the course. I recommended symptomatic treatment for bronchitis/sinusitis with inhaler, tessalon perle, and nasal spray like flonase and afrin because she was complaining of drainage.     - Return precautions also discussed. patient verbalized understanding of care plan and agreed to follow-up with PCP as advised. I estimate there is LOW risk for EPIGLOTTITIS, PNEUMONIA, MENINGITIS, OR URINARY TRACT INFECTION, thus I consider the discharge disposition reasonable. Also, there is no evidence or peritonitis, sepsis, or toxicity. feel free to contact the dictating provider for clarification.      Tani Renteria MD  [facility] Fritz Johnson MD  01/12/20 [id number]

## 2020-04-28 ENCOUNTER — OFFICE VISIT (OUTPATIENT)
Dept: ORTHOPEDIC SURGERY | Age: 85
End: 2020-04-28
Payer: MEDICARE

## 2020-04-28 VITALS — HEIGHT: 65 IN | WEIGHT: 162 LBS | BODY MASS INDEX: 26.99 KG/M2

## 2020-04-28 PROCEDURE — 1090F PRES/ABSN URINE INCON ASSESS: CPT | Performed by: ORTHOPAEDIC SURGERY

## 2020-04-28 PROCEDURE — G8427 DOCREV CUR MEDS BY ELIG CLIN: HCPCS | Performed by: ORTHOPAEDIC SURGERY

## 2020-04-28 PROCEDURE — 99213 OFFICE O/P EST LOW 20 MIN: CPT | Performed by: ORTHOPAEDIC SURGERY

## 2020-04-28 PROCEDURE — G8419 CALC BMI OUT NRM PARAM NOF/U: HCPCS | Performed by: ORTHOPAEDIC SURGERY

## 2020-04-28 RX ORDER — TRIAMCINOLONE ACETONIDE 40 MG/ML
80 INJECTION, SUSPENSION INTRA-ARTICULAR; INTRAMUSCULAR ONCE
Status: COMPLETED | OUTPATIENT
Start: 2020-04-28 | End: 2020-04-28

## 2020-04-28 RX ORDER — BUPIVACAINE HYDROCHLORIDE 2.5 MG/ML
2 INJECTION, SOLUTION INFILTRATION; PERINEURAL ONCE
Status: COMPLETED | OUTPATIENT
Start: 2020-04-28 | End: 2020-04-28

## 2020-04-28 RX ADMIN — TRIAMCINOLONE ACETONIDE 80 MG: 40 INJECTION, SUSPENSION INTRA-ARTICULAR; INTRAMUSCULAR at 10:40

## 2020-04-28 RX ADMIN — BUPIVACAINE HYDROCHLORIDE 5 MG: 2.5 INJECTION, SOLUTION INFILTRATION; PERINEURAL at 10:39

## 2020-04-28 NOTE — PROGRESS NOTES
cuff tears are very common in ageing population. We discussed some of the literature which has delineated the incidence of rotator cuff tears over time. The full spectrum of rotator cuff tears beginning with tendinopathy and extending to partial-thickness tears and full-thickness tears were notable. We also discussed available research which has shown good success rates with conservative treatment of rotator cuff pathology. Conservative supportive care with heat, ice, topicals, the judicious use of anti-inflammatories and physical therapy was outlined. The process of patient compensation through muscular adaptations and physical therapy was notable. Finally, the indications for rotator cuff repair were discussed; failure of conservative treatment, significant functional weakness and symptoms affecting the quality of daily life. After our detailed discussion we are going to provide her with a injection today. Hopefully this will be both therapeutic and diagnostic. He will let us know how much pain is coming from the shoulder. Continue with heat, topicals and some light exercises. Otherwise she understands the component of pain that may be from the cervical spine    We appreciate the opportunity to care for this patient. The trust that is implicit in this referral does not go unnoticed. We will do our very best to provide high-quality care that goes above and beyond standards. Please feel free contact us with any questions or concerns about this patient. [facility]     This dictation was performed with a verbal recognition program (DRAGON) and it was checked for errors. It is possible that there are still dictated errors within this office note. If so, please bring any errors to my attention for an addendum. All efforts were made to ensure that this office note is accurate.

## 2020-11-03 PROBLEM — I10 HYPERTENSION: Status: RESOLVED | Noted: 2019-05-29 | Resolved: 2020-11-03

## 2021-01-20 ENCOUNTER — IMMUNIZATION (OUTPATIENT)
Dept: PRIMARY CARE CLINIC | Age: 86
End: 2021-01-20
Payer: MEDICARE

## 2021-01-20 PROCEDURE — 91300 COVID-19, PFIZER VACCINE 30MCG/0.3ML DOSE: CPT | Performed by: FAMILY MEDICINE

## 2021-01-20 PROCEDURE — 0001A COVID-19, PFIZER VACCINE 30MCG/0.3ML DOSE: CPT | Performed by: FAMILY MEDICINE

## 2021-02-10 ENCOUNTER — IMMUNIZATION (OUTPATIENT)
Dept: PRIMARY CARE CLINIC | Age: 86
End: 2021-02-10
Payer: MEDICARE

## 2021-02-10 PROCEDURE — 0002A COVID-19, PFIZER VACCINE 30MCG/0.3ML DOSE: CPT | Performed by: FAMILY MEDICINE

## 2021-02-10 PROCEDURE — 91300 COVID-19, PFIZER VACCINE 30MCG/0.3ML DOSE: CPT | Performed by: FAMILY MEDICINE

## 2021-05-10 ENCOUNTER — OFFICE VISIT (OUTPATIENT)
Dept: ORTHOPEDIC SURGERY | Age: 86
End: 2021-05-10
Payer: MEDICARE

## 2021-05-10 VITALS — HEIGHT: 62 IN | BODY MASS INDEX: 27.6 KG/M2 | WEIGHT: 150 LBS

## 2021-05-10 DIAGNOSIS — M54.6 ACUTE MIDLINE THORACIC BACK PAIN: ICD-10-CM

## 2021-05-10 DIAGNOSIS — M54.50 LUMBAR SPINE PAIN: Primary | ICD-10-CM

## 2021-05-10 DIAGNOSIS — M51.36 DDD (DEGENERATIVE DISC DISEASE), LUMBAR: ICD-10-CM

## 2021-05-10 DIAGNOSIS — M41.9 SCOLIOSIS OF LUMBAR SPINE, UNSPECIFIED SCOLIOSIS TYPE: ICD-10-CM

## 2021-05-10 DIAGNOSIS — M54.50 LUMBAR SPINE PAIN: ICD-10-CM

## 2021-05-10 PROCEDURE — G8417 CALC BMI ABV UP PARAM F/U: HCPCS | Performed by: FAMILY MEDICINE

## 2021-05-10 PROCEDURE — 99203 OFFICE O/P NEW LOW 30 MIN: CPT | Performed by: FAMILY MEDICINE

## 2021-05-10 PROCEDURE — 1090F PRES/ABSN URINE INCON ASSESS: CPT | Performed by: FAMILY MEDICINE

## 2021-05-10 PROCEDURE — G8427 DOCREV CUR MEDS BY ELIG CLIN: HCPCS | Performed by: FAMILY MEDICINE

## 2021-05-10 PROCEDURE — L0625 LO FLEX L1-BELOW L5 PRE OTS: HCPCS | Performed by: FAMILY MEDICINE

## 2021-05-10 RX ORDER — MELOXICAM 15 MG/1
15 TABLET ORAL DAILY
Qty: 30 TABLET | Refills: 3 | Status: SHIPPED | OUTPATIENT
Start: 2021-05-10

## 2021-05-10 RX ORDER — MELOXICAM 15 MG/1
15 TABLET ORAL DAILY
Qty: 90 TABLET | Refills: 3 | Status: SHIPPED | OUTPATIENT
Start: 2021-05-10

## 2021-05-10 RX ORDER — MELOXICAM 15 MG/1
15 TABLET ORAL DAILY
Qty: 30 TABLET | Refills: 3 | Status: SHIPPED | OUTPATIENT
Start: 2021-05-10 | End: 2021-05-10

## 2021-05-10 RX ORDER — METHYLPREDNISOLONE 4 MG/1
TABLET ORAL
Qty: 21 KIT | Refills: 0 | Status: SHIPPED | OUTPATIENT
Start: 2021-05-10 | End: 2022-04-06

## 2021-05-10 NOTE — PROGRESS NOTES
Chief Complaint  Lower Back Pain (N LUMBAR SPINE)      Consultation progressive worsening mechanical low back pain    History of Present Illness:  Vernon Armstrong is a 81 y.o. 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 Fausto Diaz who is being seen today in kind consultation from Dr. William Coronado for acute on chronic worsening mechanical low back pain right greater than left. She states that over the past 3 to 4 years she has had episodic pain in her back and does have obvious kyphosis and states that in the past 6 months she has had more consistent pain in her lower back which is more sharp in character to the posterior lateral aspect of her lumbar spine radiating proximally up into the periscapular region. She does have obvious kyphosis as well resulting in some regular left thoracic pain at times. There is no history of actual injury no activity prior to becoming symptomatic although she does have pain with prolonged standing walking as well as sitting and sudden positional changes. She will have some pain radiating to the right SI joint and gluteal region but does not really radiate pain into her lower extremities although she does have a history of neuropathy. She does have to care for her  who is on dialysis 3 times per week. There is no history of fall or trauma. She does have subjective weakness into her legs but is never had imaging. Her primary care providers did recommend over-the-counter ibuprofen and orthopedic consultation with imaging if her symptoms persisted prompting today's visit. She does deny neurogenic bowel or bladder symptoms.     Pain Assessment  Location of Pain: Back  Location Modifiers: Posterior  Severity of Pain: 8  Quality of Pain: Dull, Sharp, Aching  Duration of Pain: Persistent  Frequency of Pain: Constant  Aggravating Factors: Stairs, Walking, Standing, Squatting, Bending  Limiting Behavior: Yes  Result of Injury: No  Work-Related Injury: No  Are there other pain locations you wish to document?: No       Medical History  Patient's medications, allergies, past medical, surgical, social and family histories were reviewed and updated as appropriate. Review of Systems  Pertinent items are noted in HPI  Review of systems reviewed from Patient History Form dated on 5/10/2021 and available in the patient's chart under the Media tab. Vital Signs  There were no vitals filed for this visit. General Exam:     Constitutional: Patient is adequately groomed with no evidence of malnutrition  DTRs: Deep tendon reflexes are intact  Mental Status: The patient is oriented to time, place and person. The patient's mood and affect are appropriate. Lymphatic: The lymphatic examination bilaterally reveals all areas to be without enlargement or induration. Vascular: Examination reveals no swelling or calf tenderness. Peripheral pulses are palpable and 2+. Neurological: The patient has good coordination. There is no weakness or sensory deficit. Lumbar/Sacral Spine Examination  Inspection: There is obvious kyphosis and she does appear to have some degenerative scoliosis with right-sided humping. Palpation: Does have clinical tenderness along lumbar paraspinals and lower lumbar facets right and left side. She does have regular left SI tenderness as well. Central gluteal tenderness noted once again right greater than left. He does have some right and left periscapular and thoracic paraspinal discomfort. She does have diminished scapular protraction retraction. Rang of Motion: He does have tenderness and discomfort with flexion beyond 40. She is unable to extend to about neutral.  50% reduction in lateral bending rotation. Strength: She does have hip weakness 4 out of 5 with hip flexion and abduction. There is not appear to be additional focal lower extremity motor deficits. Special Tests: She does have a negative straight leg raise bilaterally but is tight with regard to her hamstrings. No groin pain with logroll testing. Skin: There are no rashes, ulcerations or lesions. Distal motor sensory and vascular exams intact. Gait: Mild discomfort with positional change. Reflexes:  Symmetrically preserved     Additional Comments:     Additional Examinations:  Right Lower Extremity: Examination of the right lower extremity does not show any tenderness, deformity or injury. Range of motion is unremarkable. There is no gross instability. There are no rashes, ulcerations or lesions. Strength and tone are normal.  Left Lower Extremity: Examination of the left lower extremity does not show any tenderness, deformity or injury. Range of motion is unremarkable. There is no gross instability. There are no rashes, ulcerations or lesions. Strength and tone are normal.      Diagnostic Test Findings: AP and lateral lumbar spine films were obtained today and does show degenerative scoliosis with multilevel lumbar degenerative disc disease and facet arthropathy. I see no evidence of obvious acute compression fracture with endplate spurring noted. Assessment: #1. 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 #2.  History of idiopathic neuropathy. Impression:  Encounter Diagnoses   Name Primary?     Lumbar spine pain Yes    Scoliosis of lumbar spine, unspecified scoliosis type     Acute midline thoracic back pain     DDD (degenerative disc disease), lumbar        Office Procedures:  Orders Placed This Encounter   Procedures    XR LUMBAR SPINE (2-3 VIEWS)     Standing Status:   Future     Number of Occurrences:   1     Standing Expiration Date:   5/10/2022     Order Specific Question:   Reason for exam:     Answer:   pain    MRI LUMBAR SPINE WO CONTRAST     Standing Status:   Future     Standing Expiration Date:   8/10/2021     Scheduling Instructions:      [id number] #:      TIME AND DATE TBD      PLEASE CALL PATIENT ONCE APPROVED TO SCHEDULE       PUSH TO [employer] [facility]            Remember that it may take several business days to pre-cert your MRI through your insurance. Our office will contact you as soon as we have the approval. We will not give any test results over the phone. Please call [phone] once you have your test day and time to schedule a follow up with Dr. Philipp Griffiths. Order Specific Question:   Reason for exam:     Answer:   EVALUATE DDD/SPINAL STENOSIS    Ambulatory referral to Physical Therapy     Referral Priority:   Routine     Referral Type:   Eval and Treat     Referral Reason:   Specialty Services Required     Requested Specialty:   Physical Therapy     Number of Visits Requested:   1    Bird and Rosemarie Extensor Lumbosacral Support Brace (Warm and Form)     Patient was prescribed a Bird and Rosemarie Extensor Lumbosacral Support Brace with a pocket. This orthosis is required for the following reasons:    Reduce pain by restricting mobility of the trunk  Facilitate healing following an injury to the spine or related soft tissues  Support weak spinal muscles    The patient was educated and fit by a healthcare professional with expert knowledge and specialization in brace application while under the direct supervision of the treating physician. Verbal and written instructions for the use of and application of this item were provided. They were instructed to contact the office immediately should the brace result in increased pain, decreased sensation, increased swelling or worsening of the condition. Treatment Plan:  Treatment options were discussed with Tony Chow.   We did review her plain films and exam findings. She does have degenerative scoliosis and spondylolysis about the lumbar spine with a likely secondary thoracic and paraspinal kyphosis process causing episodic pain. Majority of her pain is in her lower back. I would like for her to have an MRI of her lumbar spine to more thoroughly evaluate the degree of degenerative changes and rule out signs consistent with spinal stenosis. She is not actively radicular currently but does have a history of neuropathy. I would like for her to have an MRI of her lumbar spine and we did place her in a warm-and-form belt we will start her into physical therapy. We also started her on a Medrol Dosepak to be followed by meloxicam 15 mg daily. She will monitor her blood pressure. I would like for her to follow-up with Carlos Harrington in the spine team post imaging. She will contact us in the interim with questions or concerns. CC: Dr. Kati Faith          This dictation was performed with a verbal recognition program [facility]) and it was checked for errors. It is possible that there are still dictated errors within this office note. If so, please bring any errors to my attention for an addendum. All efforts were made to ensure that this office note is accurate.

## 2021-05-10 NOTE — PATIENT INSTRUCTIONS
Take Medrol first for 6 days. This is a steroid pack. Flip the package over to the foil side and the directions will tell you to start with 6 pills the first day, 5 pills the second day, etc. Please do not take any other anti-inflammatories with the medrol dose rhonda as this can upset your stomach. If something else is needed, you may take extra strength tylenol.      Once you are finished with the medrol, then you may re-start or start your anti-inflammatory: MELOXICAM 1X/DAY

## 2021-05-11 ENCOUNTER — TELEPHONE (OUTPATIENT)
Dept: ORTHOPEDIC SURGERY | Age: 86
End: 2021-05-11

## 2021-05-11 ENCOUNTER — HOSPITAL ENCOUNTER (OUTPATIENT)
Dept: PHYSICAL THERAPY | Age: 86
Setting detail: THERAPIES SERIES
Discharge: HOME | End: 2021-05-11
Payer: MEDICARE

## 2021-05-11 PROCEDURE — 97110 THERAPEUTIC EXERCISES: CPT | Performed by: PHYSICAL THERAPIST

## 2021-05-11 PROCEDURE — 97112 NEUROMUSCULAR REEDUCATION: CPT | Performed by: PHYSICAL THERAPIST

## 2021-05-11 PROCEDURE — 97161 PT EVAL LOW COMPLEX 20 MIN: CPT | Performed by: PHYSICAL THERAPIST

## 2021-05-11 NOTE — FLOWSHEET NOTE
[facility], [address]  Phone: [phone]  Fax [phone]    Physical Therapy Treatment Note/ Progress Report:           Date:  2021    Patient Name:  Manav Selby    :  1935  MRN: [id number]  Restrictions/Precautions:  Lumbar DDD/ scoliosis   Medical/Treatment Diagnosis Information:  · Diagnosis: M54.5 (ICD-10-CM) - Lumbar spine pain,M41.9 (ICD-10-CM) - Scoliosis of lumbar spine, unspecified scoliosis type, M54.6 (ICD-10-CM) - Acute midline thoracic back pain, M51.36 (ICD-10-CM) - DDD (degenerative disc disease), lumbar  · Treatment Diagnosis: M54.5 (ICD-10-CM) - Lumbar spine pain,M41.9 (ICD-10-CM) - Scoliosis of lumbar spine, unspecified scoliosis type, M54.6 (ICD-10-CM) - Acute midline thoracic back pain, M51.36 (ICD-10-CM) - DDD (degenerative disc disease), lumbar  Insurance/Certification information:  PT Insurance Information: [employer] / LUIS M/ Needs Cohere Auth  Physician Information:  Referring Practitioner: Dr. Keli Sparks  Has the plan of care been signed (Y/N):        []  Yes  [x]  No     Date of Patient follow up with Physician: To F/U with Chip Christopher after MRI. Is this a Progress Report:     []  Yes  [x]  No        If Yes:  Date Range for reporting period:  Beginning21  Endin/10/21    Progress report will be due (10 Rx or 30 days whichever is less):        Recertification will be due (POC Duration  / 90 days whichever is less): 12 weeks.  21        Visit # Insurance Allowable Auth Required   In-person 1 Rocky / LUIS M [x]  Yes Cohere []  No    Telehealth   []  Yes []  No    Total            Functional Scale: Modified Oswestry: 50 28%    Date assessed:  21      Therapy Diagnosis/Practice Pattern:4F      Number of Comorbidities:  []0     [x]1-2    []3+    Latex Allergy:  [x]NO      []YES  Preferred Language for Healthcare:   [x]English weekly - 10 reps - 10 hold     Therapeutic Exercise and NMR EXR  [x] (83844) Provided verbal/tactile cueing for activities related to strengthening, flexibility, endurance, ROM  for improvements in proximal hip and core control with self care, mobility, lifting and ambulation. [x] (36886) Provided verbal/tactile cueing for activities related to improving balance, coordination, kinesthetic sense, posture, motor skill, proprioception  to assist with core control in self care, mobility, lifting, and ambulation. Therapeutic Activities:    [] (05387 or 96430) Provided verbal/tactile cueing for activities related to improving balance, coordination, kinesthetic sense, posture, motor skill, proprioception and motor activation to allow for proper function  with self care and ADLs  [] (08278) Provided training and instruction to the patient for proper core and proximal hip recruitment and positioning with ambulation re-education     Home Exercise Program:    [x] (81412) Reviewed/Progressed HEP activities related to strengthening, flexibility, endurance, ROM of core, proximal hip and LE for functional self-care, mobility, lifting and ambulation   [] (94879) Reviewed/Progressed HEP activities related to improving balance, coordination, kinesthetic sense, posture, motor skill, proprioception of core, proximal hip and LE for self care, mobility, lifting, and ambulation      Manual Treatments:  PROM / STM / Oscillations-Mobs:  G-I, II, III, IV (PA's, Inf., Post.)  [] (29362) Provided manual therapy to mobilize proximal hip and LS spine soft tissue/joints for the purpose of modulating pain, promoting relaxation,  increasing ROM, reducing/eliminating soft tissue swelling/inflammation/restriction, improving soft tissue extensibility and allowing for proper ROM for normal function with self care, mobility, lifting and ambulation.      Modalities:   MH in L SL position x 10 min after treatment    Charges  Timed Code Treatment w/c into car when transporting .(patient specific functional goal)    []? Progressing: []? Met: []? Not Met: []? Adjusted        Progression Towards Functional goals:  [] Patient is progressing as expected towards functional goals listed. [] Progression is slowed due to complexities listed. [] Progression has been slowed due to co-morbidities. [x] Plan just implemented, too soon to assess goals progression  [] Other:     Overall Progression Towards Functional goals/ Treatment Progress Update:  [] Patient is progressing as expected towards functional goals listed. [] Progression is slowed due to complexities/Impairments listed. [] Progression has been slowed due to co-morbidities. [x] Plan just implemented, too soon to assess goals progression <30days   [] Goals require adjustment due to lack of progress  [] Patient is not progressing as expected and requires additional follow up with physician  [] Other    Prognosis for POC: [x] Good [] Fair  [] Poor      Patient requires continued skilled intervention: [x] Yes  [] No    Treatment/Activity Tolerance:  [x] Patient able to complete treatment  [] Patient limited by fatigue  [] Patient limited by pain    [] Patient limited by other medical complications  [] Other:     ASSESSMENT: Patient presents with limited lumbar spine mobility, pain with movement, Decreased LE flexibility and decreased core strength. Patient's care giving responsibilities may be contributing to pain in lumbar spine and has decreased awareness on proper mechanics to minimize increased stress on spine. Pt requires skilled intervention to restore ROM, strength, functional endurance and balance in order to perform ADLs without significant symptoms or limitations.     Return to Play: (if applicable)   []  Stage 1: Intro to Strength   []  Stage 2: Return to Run and Strength   []  Stage 3: Return to Jump and Strength   []  Stage 4: Dynamic Strength and Agility   []  Stage 5: Sport Specific Training     []  Ready to Return to Play, Meets All Above Stages   []  Not Ready for Return to Sports   Comments:                               PLAN: See eval. Progress lumbar stab and core strengthening  N.V. + Stabilizer N.V. for biofeedback. [] Continue per plan of care [] Alter current plan (see comments above)  [x] Plan of care initiated [] Hold pending MD visit [] Discharge      Electronically signed by:  Desean Jeffery, [address]    Note: If patient does not return for scheduled/ recommended follow up visits, this note will serve as a discharge from care along with most recent update on progress.

## 2021-05-11 NOTE — PLAN OF CARE
[facility], [address], [address] [place] [address]  Phone: [phone]  Fax [phone]    Physical Therapy Certification    Dear Referring Practitioner: Dr. Rodas Daily,    We had the pleasure of evaluating the following patient for physical therapy services at [address] [place] [address]. A summary of our findings can be found in the initial assessment below. This includes our plan of care. If you have any questions or concerns regarding these findings, please do not hesitate to contact me at the office phone number checked above. Thank you for the referral.       Physician Signature:_______________________________Date:__________________  By signing above (or electronic signature), therapists plan is approved by physician    Patient: Subhash Chirinos   : 1935   MRN: [id number]  Referring Physician: Referring Practitioner: Dr. Rodas Daily      Evaluation Date: 2021      Medical Diagnosis Information:  Diagnosis: M54.5 (ICD-10-CM) - Lumbar spine pain,M41.9 (ICD-10-CM) - Scoliosis of lumbar spine, unspecified scoliosis type, M54.6 (ICD-10-CM) - Acute midline thoracic back pain, M51.36 (ICD-10-CM) - DDD (degenerative disc disease), lumbar   Treatment Diagnosis: M54.5 (ICD-10-CM) - Lumbar spine pain,M41.9 (ICD-10-CM) - Scoliosis of lumbar spine, unspecified scoliosis type, M54.6 (ICD-10-CM) - Acute midline thoracic back pain, M51.36 (ICD-10-CM) - DDD (degenerative disc disease), lumbar                                         Insurance information: PT Insurance Information: [employer]/ BMN/ Needs Cohere Auth       Precautions/ Contra-indications: Multi- DDD lumbar spine.     C-SSRS Triggered by Intake questionnaire (Past 2 wk assessment):   [x] No, Questionnaire did not trigger screening.   [] Yes, Patient intake triggered further evaluation      [] C-SSRS Screening completed  [] PCP notified via Plan of Care  [] Emergency services notified     Latex Allergy:  [x]NO      []YES  Preferred Language for Healthcare:   [x]English       []other:    SUBJECTIVE: Patient stated complaint: Reports low back and mid back pain has been noted for about 5 years. Progressively gotten worse lately 2-3 months due to caretaking of  who goes to dialysis 3x/week. Has to lift wheelchair in and out of car when transporting him. Saw Dr. Burton Clark and MRI was authorized and needs to be scheduled. To start taking medrol dose pack/anti inflam. Has brace but reports back pain was getting worse with wearing it. To F/U with Clydene Pew . Diagnostic Test Findings: AP and lateral lumbar spine films were obtained today and does show degenerative scoliosis with multilevel lumbar degenerative disc disease and facet arthropathy. I see no evidence of obvious acute compression fracture with endplate spurring noted. Relevant Medical History: Previous PT for back several years ago. HTN, hyperlipidemia,( No known issues with osteopenia/Osteoporosis  Functional Disability Index/G-Codes:   Modified Oswestry:14/50 28%    Height: 5' 2\" Weight: 162  Pain Scale: 2-4 /10  Easing factors: lying on side,  Provocative factors: prolonged walking/standing,bending,     Type: [x]Constant   []Intermittent  []Radiating []Localized []other:     Numbness/Tingling: Numbness bottom of both week. Has peripheral neuropathy    Occupation/School: retired    Living Status/Prior Level of Function: Independent with ADLs and IADLs, caretaker of , grocery shopping,driving, Lives in [address] [facility] Has chair lift for basement. Stationary bike.      OBJECTIVE:     ROM     LUMBAR FLEX 45 P   LUMBAR EXT 5 P   Sidebend Limited 50% Limited 50% P on R   Rotation Limited 25%  Limited 25%    LEFT RIGHT   HIP Flex [facility] WFL   HIP Abd     HIP ER [facility]   HIP IR     Knee Flex [facility] WFL   Knee ext     Hamstring FLEX 55 SLR 55 SLR   Piriformis  Mod R Mod  R             Strength  LEFT RIGHT   MfA     TrA     HIP Flexors 4-/5 4-/5   HIP Abductors     HIP ER 4/5 4/5   Hip IR 4-/5 4-/5   Knee EXT (quad) 4/5 4/5   Knee Flex (HS) 4/5 4/5   Ankle DF Intact TW/HW Intact TW/HW   Ankle PF     Great Toe Ext       Reflexes/Sensation:    [x]Dermatomes/Myotomes intact    []UE Reflexes     []Normal []Hypo      []Hyper   [x]LE Reflexes     [x]Normal []Hypo      []Hyper   []Babinski/Clonus/Hoffmans:    []Other:    Joint mobility: NT this visit. []Normal    []Hypo   []Hyper    Palpation: Tender lumbar p/s / PSIS/piriformis and sacral area. Functional Mobility/Transfers: IND    Posture: R iliac crest higher, R scoliosis hump, Increased thoracic kyphosis      Gait: (include devices/WB status) WFL without assistive device. Decreased trunk/pelvis dissociation. Orthopedic Special Tests: - Slump/ - SLR                       [x] Patient history, allergies, meds reviewed. Medical chart reviewed. See intake form. Review Of Systems (ROS):  [x]Performed Review of systems (Integumentary, CardioPulmonary, Neurological) by intake and observation. Intake form has been scanned into medical record. Patient has been instructed to contact their primary care physician regarding ROS issues if not already being addressed at this time.       Co-morbidities/Complexities (which will affect course of rehabilitation):   []None           Arthritic conditions   []Rheumatoid arthritis (M05.9)  [x]Osteoarthritis (M19.91)   Cardiovascular conditions   [x]Hypertension (I10)  [x]Hyperlipidemia (E78.5)  []Angina pectoris (I20)  []Atherosclerosis (I70)   Musculoskeletal conditions   []Disc pathology   []Congenital spine pathologies   []Prior surgical intervention  []Osteoporosis (M81.8)  []Osteopenia (M85.8)   Endocrine conditions   []Hypothyroid (E03.9)  []Hyperthyroid Gastrointestinal conditions   []Constipation (F19.55)   Metabolic conditions   []Morbid obesity (E66.01)  []Diabetes type 1(E10.65) or 2 (E11.65)   [x]Neuropathy (G60.9)     Pulmonary conditions []Asthma (J45)  []Coughing   []COPD (J44.9)   Psychological Disorders  []Anxiety (F41.9)  []Depression (F32.9)   []Other:   []Other:          Barriers to/and or personal factors that will affect rehab potential:              []Age  []Sex              []Motivation/Lack of Motivation                        []Co-Morbidities              [x]Cognitive Function, education/learning barriers              []Environmental, home barriers              []profession/work barriers  []past PT/medical experience  []other:  Justification:     Falls Risk Assessment (30 days):   [x] Falls Risk assessed and no intervention required. [] Falls Risk assessed and Patient requires intervention due to being higher risk   TUG score (>12s at risk):     [] Falls education provided, including     ASSESSMENT: Patient presents with limited lumbar spine mobility, pain with movement, Decreased LE flexibility and decreased core strength. Patient's care giving responsibilities may be contributing to pain in lumbar spine and has decreased awareness on proper mechanics to minimize increased stress on spine.    Functional Impairments:     [x]Noted lumbar/proximal hip hypomobility   []Noted lumbosacral and/or generalized hypermobility   []Decreased Lumbosacral/hip/LE functional ROM   [x]Decreased core/proximal hip strength and neuromuscular control    [x]Decreased LE functional strength    []Abnormal reflexes/sensation/myotomal/dermatomal deficits  []Reduced balance/proprioceptive control    []other:      Functional Activity Limitations (from functional questionnaire and intake)   [x]Reduced ability to tolerate prolonged functional positions   []Reduced ability or difficulty with changes of positions or transfers between positions   [x]Reduced ability to maintain good posture and demonstrate good body mechanics with sitting, bending, and lifting   []Reduced ability to sleep   [] Reduced ability or tolerance with driving and/or computer work   [x]Reduced ability to perform lifting, reaching, carrying tasks   []Reduced ability to squat   [x]Reduced ability to forward bend   [x]Reduced ability to ambulate prolonged functional periods/distances/surfaces   []Reduced ability to ascend/descend stairs   []other:       Participation Restrictions   [x]Reduced participation in self care activities   [x]Reduced participation in home management activities   []Reduced participation in work activities   [x]Reduced participation in social activities. []Reduced participation in sport/recreation activities. Classification:   [x]Signs/symptoms consistent with Lumbar instability/stabilization subgroup. []Signs/symptoms consistent with Lumbar mobilization/manipulation subgroup, myotomes and dermatomes intact. Meets manipulation criteria. []Signs/symptoms consistent with Lumbar direction specific/centralization subgroup   []Signs/symptoms consistent with Lumbar traction subgroup     []Signs/symptoms consistent with lumbar facet dysfunction   [x]Signs/symptoms consistent with lumbar stenosis type dysfunction   []Signs/symptoms consistent with nerve root involvement including myotome & dermatome dysfunction   []Signs/symptoms consistent with post-surgical status including: decreased ROM, strength and function.    []signs/symptoms consistent with pathology which may benefit from Dry needling     []other:      Prognosis/Rehab Potential:      []Excellent   [x]Good    []Fair   []Poor    Tolerance of evaluation/treatment:    []Excellent   [x]Good    []Fair   []Poor  Physical Therapy Evaluation Complexity Justification  [x] A history of present problem with:  [] no personal factors and/or comorbidities that impact the plan of care;  [x]1-2 personal factors and/or comorbidities that impact the plan of care  []3 personal factors and/or comorbidities that impact the plan of care  [x] An examination of body systems using standardized tests and measures addressing any of the following: body structures and functions (impairments), activity limitations, and/or participation restrictions;:  [] a total of 1-2 or more elements   [x] a total of 3 or more elements   [] a total of 4 or more elements   [x] A clinical presentation with:  [x] stable and/or uncomplicated characteristics   [] evolving clinical presentation with changing characteristics  [] unstable and unpredictable characteristics;   [x] Clinical decision making of [x] low, [] moderate, [] high complexity using standardized patient assessment instrument and/or measurable assessment of functional outcome. [x] EVAL (LOW) 29316 (typically 20 minutes face-to-face)  [] EVAL (MOD) 29005 (typically 30 minutes face-to-face)  [] EVAL (HIGH) 54351 (typically 45 minutes face-to-face)  [] RE-EVAL         PLAN: Begin PT focusing on: proximal hip mobilizations, LB mobs, LB core activation, proximal hip activation, and HEP    Frequency/Duration:  1-2 days per week for 12 Weeks:  Interventions:  [x]  Therapeutic exercise including: strength training, ROM, for LE, Glutes and core   [x]  NMR activation and proprioception for glutes , LE and Core   [x]  Manual therapy as indicated for Hip complex, LE and spine to include: Dry Needling/IASTM, STM, PROM, Gr I-IV mobilizations, manipulation. [x]  Modalities as needed that may include: thermal agents, E-stim, Biofeedback, US, iontophoresis as indicated  [x]  Patient education on joint protection, postural re-education, activity modification, progression of HEP. HEP instruction: (see scanned forms)  Access Code: [email or web address]  URL: [email or web address]  Date: 05/11/2021  Prepared by: Yuval Lang    Exercises  Supine Piriformis Stretch with Foot on Ground - 2 x daily - 7 x weekly - 5 reps - 1 sets - 30 hold  Supine Lower Trunk Rotation - 2 x daily - 7 x weekly - 10 reps - 1-2 sets - 10 hold  Supine Hamstring Stretch - 2 x daily - 7 x weekly - 5 reps - 1 sets - 30 hold  Supine Hip Adduction Isometric with Ball - 3 x daily - 7 x weekly - 10 reps - 10 hold  Supine Transversus Abdominis Bracing - Hands on Stomach - 3 x daily - 7 x weekly - 10 reps - 10 hold    GOALS:  Patient stated goal: Reduce pain/ get stronger  [] Progressing: [] Met: [] Not Met: [] Adjusted    Therapist goals for Patient:   Short Term Goals: To be achieved in: 2 weeks  1. Independent in HEP and progression per patient tolerance, in order to prevent re-injury. [] Progressing: [] Met: [] Not Met: [] Adjusted  2. Patient will have a decrease in pain to facilitate improvement in movement, function, and ADLs as indicated by Functional Deficits. [] Progressing: [] Met: [] Not Met: [] Adjusted      Long Term Goals: To be achieved in: 12 weeks  1. Disability index score of 19% or less for the Modified Oswestry  to assist with reaching prior level of function. [] Progressing: [] Met: [] Not Met: [] Adjusted  2. Patient will demonstrate increased AROM to WNL, good LS mobility, good hip ROM to allow for proper joint functioning as indicated by patients Functional Deficits. [] Progressing: [] Met: [] Not Met: [] Adjusted  3. Patient will demonstrate an increase in Strength to good proximal hip and core activation to allow for proper functional mobility as indicated by patients Functional Deficits. [] Progressing: [] Met: [] Not Met: [] Adjusted  4. Patient will return to standing in kitchen to make dinner  functional activities without increased symptoms or restriction. [] Progressing: [] Met: [] Not Met: [] Adjusted  5.  Patient to report 25%-50% pain reduction with lifting w/c into car when transporting .(patient specific functional goal)    [] Progressing: [] Met: [] Not Met: [] Adjusted       Electronically signed by:  Vibha Sarmiento, [address]

## 2021-05-18 ENCOUNTER — HOSPITAL ENCOUNTER (OUTPATIENT)
Dept: PHYSICAL THERAPY | Age: 86
Setting detail: THERAPIES SERIES
Discharge: HOME | End: 2021-05-18
Payer: MEDICARE

## 2021-05-18 PROCEDURE — 97112 NEUROMUSCULAR REEDUCATION: CPT | Performed by: PHYSICAL THERAPIST

## 2021-05-18 PROCEDURE — 97110 THERAPEUTIC EXERCISES: CPT | Performed by: PHYSICAL THERAPIST

## 2021-05-18 NOTE — FLOWSHEET NOTE
[x]English       []other:      Pain level:  2-4/10    SUBJECTIVE:  Reports having MRI last night of lumbar spine. F/U with Yadi on 6/7/21. In general not feeling too much symptoms today but has not done much in general. When taking  to appointment lifting his wheelchair into the car was less painful. Was watching body mechanics as well. Reports taking steroids for only 2 days and had bad stomach issues, so stopped taking them. Hasn't tried the anti-inflam meds yet. OBJECTIVE: See eval   Observation:    Test measurements:    OBJECTIVE  Test used 5/11/21 Current Score   Pain Summary  2-4/10    Functional questionnaire Modified Oswestry 28%    Functional Testing Lumbar Flex 45 P     Lumbar Ext 5 P    ROM Lateral Flexion R/L Limited 50% with P to R      Hip abd  4-/5    Strength Hip flexors 4-/5     Hip IR 4-/5     TrA with stabilizer 42-44lbs        RESTRICTIONS/PRECAUTIONS:  MRI to be scheduled. AP and lateral lumbar spine films were obtained today and does show degenerative scoliosis with multilevel lumbar degenerative disc disease and facet arthropathy.  I see no evidence of obvious acute compression fracture with endplate spurring noted.     Relevant Medical History: Previous PT for back several years ago.  HTN, hyperlipidemia,( No known issues with osteopenia/Osteoporosis  Exercises/Interventions:     Therapeutic Ex (92727) Sets/sec Reps Notes/CUES   Supine piriformis stretches H30 5 HEP   Supine H/S stretches with strap H30 5 HEP   LTR H10 10x HEP         TrA with tactile cues on ASIS H10 10 reps HEP   TrA with hip adduction with ball H5 x10 reps HEP         TrA with stabilizer  H5 10 reps Started at 42 lb and went to 44 lb   Bridges H5 10 reps    Bike 5'     Incline stretches H30 3 reps    Clams  N.V.     Manual Intervention ([phone])                                          NMR re-education (52495)   CUES NEEDED   Patient education on proper body mechanics with lifting w/c, with bending and utilizing TrA 10'                                                     Therapeutic Activity (68072)                                            Access Code: [email or web address]   URL: [email or web address]  Date: 05/11/2021  Prepared by: Marco Armando     Exercises  Supine Piriformis Stretch with Foot on Ground - 2 x daily - 7 x weekly - 5 reps - 1 sets - 30 hold  Supine Lower Trunk Rotation - 2 x daily - 7 x weekly - 10 reps - 1-2 sets - 10 hold  Supine Hamstring Stretch - 2 x daily - 7 x weekly - 5 reps - 1 sets - 30 hold  Supine Hip Adduction Isometric with Ball - 3 x daily - 7 x weekly - 10 reps - 10 hold  Supine Transversus Abdominis Bracing - Hands on Stomach - 3 x daily - 7 x weekly - 10 reps - 10 hold     Therapeutic Exercise and NMR EXR  [x] (10771) Provided verbal/tactile cueing for activities related to strengthening, flexibility, endurance, ROM  for improvements in proximal hip and core control with self care, mobility, lifting and ambulation. [x] (32347) Provided verbal/tactile cueing for activities related to improving balance, coordination, kinesthetic sense, posture, motor skill, proprioception  to assist with core control in self care, mobility, lifting, and ambulation.      Therapeutic Activities:    [] (13577 or 33602) Provided verbal/tactile cueing for activities related to improving balance, coordination, kinesthetic sense, posture, motor skill, proprioception and motor activation to allow for proper function  with self care and ADLs  [] (88374) Provided training and instruction to the patient for proper core and proximal hip recruitment and positioning with ambulation re-education     Home Exercise Program:    [x] (75202) Reviewed/Progressed HEP activities related to strengthening, flexibility, endurance, ROM of core, proximal hip and LE for functional self-care, mobility, lifting and ambulation   [] (48752) Reviewed/Progressed HEP activities related to improving balance, coordination, kinesthetic sense, posture, motor skill, proprioception of core, proximal hip and LE for self care, mobility, lifting, and ambulation      Manual Treatments:  PROM / STM / Oscillations-Mobs:  G-I, II, III, IV (PA's, Inf., Post.)  [] (53825) Provided manual therapy to mobilize proximal hip and LS spine soft tissue/joints for the purpose of modulating pain, promoting relaxation,  increasing ROM, reducing/eliminating soft tissue swelling/inflammation/restriction, improving soft tissue extensibility and allowing for proper ROM for normal function with self care, mobility, lifting and ambulation. Modalities:   MH in L SL position x 10 min after treatment    Charges  Timed Code Treatment Minutes: 39'   Total Treatment Minutes: 61'     Medicare total (add KX at $2000)         BWC time in/ time out:    TE time in /out    Manual time in/out    Neuro re ed time in/out    Untimed minutes        [] EVAL (LOW) 45976   [] EVAL (MOD) 23285   [] EVAL (HIGH) 72887   [] RE-EVAL     [x] VX(14195) x  2   [] IONTO  [x] NMR (20102) x 1    [] VASO  [] Manual (15241) x      [] Other:  [] TA x      [] Mech Traction (79417)  [] ES(attended) (85243)      [] ES (un) (97079):     Goals:  (cut and paste from eval)  Patient stated goal: Reduce pain/ get stronger  []? Progressing: []? Met: []? Not Met: []? Adjusted     Therapist goals for Patient:   Short Term Goals: To be achieved in: 2 weeks  1. Independent in HEP and progression per patient tolerance, in order to prevent re-injury. []? Progressing: []? Met: []? Not Met: []? Adjusted  2. Patient will have a decrease in pain to facilitate improvement in movement, function, and ADLs as indicated by Functional Deficits. []? Progressing: []? Met: []? Not Met: []? Adjusted        Long Term Goals: To be achieved in: 12 weeks  1. Disability index score of 19% or less for the Modified Oswestry  to assist with reaching prior level of function. []? Progressing: []? Met: []? Not Met: []? Adjusted  2.  Patient will demonstrate increased AROM to WNL, good LS mobility, good hip ROM to allow for proper joint functioning as indicated by patients Functional Deficits. []? Progressing: []? Met: []? Not Met: []? Adjusted  3. Patient will demonstrate an increase in Strength to good proximal hip and core activation to allow for proper functional mobility as indicated by patients Functional Deficits. []? Progressing: []? Met: []? Not Met: []? Adjusted  4. Patient will return to standing in kitchen to make dinner  functional activities without increased symptoms or restriction. []? Progressing: []? Met: []? Not Met: []? Adjusted  5. Patient to report 25%-50% pain reduction with lifting w/c into car when transporting .(patient specific functional goal)    []? Progressing: []? Met: []? Not Met: []? Adjusted        Progression Towards Functional goals:  [] Patient is progressing as expected towards functional goals listed. [] Progression is slowed due to complexities listed. [] Progression has been slowed due to co-morbidities. [x] Plan just implemented, too soon to assess goals progression  [] Other:     Overall Progression Towards Functional goals/ Treatment Progress Update:  [] Patient is progressing as expected towards functional goals listed. [] Progression is slowed due to complexities/Impairments listed. [] Progression has been slowed due to co-morbidities.   [x] Plan just implemented, too soon to assess goals progression <30days   [] Goals require adjustment due to lack of progress  [] Patient is not progressing as expected and requires additional follow up with physician  [] Other    Prognosis for POC: [x] Good [] Fair  [] Poor      Patient requires continued skilled intervention: [x] Yes  [] No    Treatment/Activity Tolerance:  [x] Patient able to complete treatment  [] Patient limited by fatigue  [] Patient limited by pain    [] Patient limited by other medical complications  [] Other:     ASSESSMENT: Patient presents with limited lumbar spine mobility, pain with movement, Decreased LE flexibility and decreased core strength. Patient's care giving responsibilities may be contributing to pain in lumbar spine and has decreased awareness on proper mechanics to minimize increased stress on spine. Pt requires skilled intervention to restore ROM, strength, functional endurance and balance in order to perform ADLs without significant symptoms or limitations. Return to Play: (if applicable)   []  Stage 1: Intro to Strength   []  Stage 2: Return to Run and Strength   []  Stage 3: Return to Jump and Strength   []  Stage 4: Dynamic Strength and Agility   []  Stage 5: Sport Specific Training     []  Ready to Return to Play, Meets All Above Stages   []  Not Ready for Return to Sports   Comments:                               PLAN: See eval. Progress lumbar stab and core strengthening. [] Continue per plan of care [] Alter current plan (see comments above)  [x] Plan of care initiated [] Hold pending MD visit [] Discharge      Electronically signed by:  Marco Armando, [address]    Note: If patient does not return for scheduled/ recommended follow up visits, this note will serve as a discharge from care along with most recent update on progress.

## 2021-05-25 ENCOUNTER — HOSPITAL ENCOUNTER (OUTPATIENT)
Dept: PHYSICAL THERAPY | Age: 86
Setting detail: THERAPIES SERIES
Discharge: HOME | End: 2021-05-25
Payer: MEDICARE

## 2021-05-25 PROCEDURE — 97140 MANUAL THERAPY 1/> REGIONS: CPT | Performed by: PHYSICAL THERAPIST

## 2021-05-25 PROCEDURE — 97110 THERAPEUTIC EXERCISES: CPT | Performed by: PHYSICAL THERAPIST

## 2021-05-25 PROCEDURE — 97112 NEUROMUSCULAR REEDUCATION: CPT | Performed by: PHYSICAL THERAPIST

## 2021-05-25 NOTE — FLOWSHEET NOTE
[facility], [address]  Phone: [phone]  Fax [phone]    Physical Therapy Treatment Note/ Progress Report:           Date:  2021    Patient Name:  Elizabeth Razo    :  1935  MRN: [id number]  Restrictions/Precautions:  Lumbar DDD/ scoliosis   Medical/Treatment Diagnosis Information:  · Diagnosis: M54.5 (ICD-10-CM) - Lumbar spine pain,M41.9 (ICD-10-CM) - Scoliosis of lumbar spine, unspecified scoliosis type, M54.6 (ICD-10-CM) - Acute midline thoracic back pain, M51.36 (ICD-10-CM) - DDD (degenerative disc disease), lumbar  · Treatment Diagnosis: M54.5 (ICD-10-CM) - Lumbar spine pain,M41.9 (ICD-10-CM) - Scoliosis of lumbar spine, unspecified scoliosis type, M54.6 (ICD-10-CM) - Acute midline thoracic back pain, M51.36 (ICD-10-CM) - DDD (degenerative disc disease), lumbar  Insurance/Certification information:  PT Insurance Information: [facility]/ Needs Cohere Auth  Physician Information:   Dr. Raman Emmanuel  Has the plan of care been signed (Y/N):        []  Yes  [x]  No     Date of Patient follow up with Physician: To F/U with Carlos Harrington on 21 for MRI results. Is this a Progress Report:     []  Yes  [x]  No        If Yes:  Date Range for reporting period:  Beginning21  Endin/10/21    Progress report will be due (10 Rx or 30 days whichever is less): 3/93/02       Recertification will be due (POC Duration  / 90 days whichever is less): 12 weeks.  21        Visit # Insurance Allowable Auth Required   In-person 3/24 [facility]  24 visits between 21-8/10/21 [x]  Yes Cohere []  No    Telehealth   []  Yes []  No    Total            Functional Scale: Modified Oswestry:  28%    Date assessed:  21      Therapy Diagnosis/Practice Pattern:4F      Number of Comorbidities:  []0     [x]1-2    []3+    Latex Allergy:  [x]NO      []YES  Preferred Language for Healthcare: bending and utilizing TrA 10'                                                     Therapeutic Activity (28179)                                            Access Code: [email or web address]   URL: [email or web address]  Date: 05/11/2021  Prepared by: Sofia Han     Exercises  Supine Piriformis Stretch with Foot on Ground - 2 x daily - 7 x weekly - 5 reps - 1 sets - 30 hold  Supine Lower Trunk Rotation - 2 x daily - 7 x weekly - 10 reps - 1-2 sets - 10 hold  Supine Hamstring Stretch - 2 x daily - 7 x weekly - 5 reps - 1 sets - 30 hold  Supine Hip Adduction Isometric with Ball - 3 x daily - 7 x weekly - 10 reps - 10 hold  Supine Transversus Abdominis Bracing - Hands on Stomach - 3 x daily - 7 x weekly - 10 reps - 10 hold     Therapeutic Exercise and NMR EXR  [x] (65993) Provided verbal/tactile cueing for activities related to strengthening, flexibility, endurance, ROM  for improvements in proximal hip and core control with self care, mobility, lifting and ambulation. [x] (07095) Provided verbal/tactile cueing for activities related to improving balance, coordination, kinesthetic sense, posture, motor skill, proprioception  to assist with core control in self care, mobility, lifting, and ambulation.      Therapeutic Activities:    [] (81673 or 64966) Provided verbal/tactile cueing for activities related to improving balance, coordination, kinesthetic sense, posture, motor skill, proprioception and motor activation to allow for proper function  with self care and ADLs  [] (17692) Provided training and instruction to the patient for proper core and proximal hip recruitment and positioning with ambulation re-education     Home Exercise Program:    [x] (81619) Reviewed/Progressed HEP activities related to strengthening, flexibility, endurance, ROM of core, proximal hip and LE for functional self-care, mobility, lifting and ambulation   [] (75753) Reviewed/Progressed HEP activities related to improving balance, coordination, kinesthetic sense, posture, motor skill, proprioception of core, proximal hip and LE for self care, mobility, lifting, and ambulation      Manual Treatments:  PROM / STM / Oscillations-Mobs:  G-I, II, III, IV (PA's, Inf., Post.)  [x] (10381) Provided manual therapy to mobilize proximal hip and LS spine soft tissue/joints for the purpose of modulating pain, promoting relaxation,  increasing ROM, reducing/eliminating soft tissue swelling/inflammation/restriction, improving soft tissue extensibility and allowing for proper ROM for normal function with self care, mobility, lifting and ambulation. Modalities:   . Deferred this visit. Charges  Timed Code Treatment Minutes: 39'   Total Treatment Minutes: 39'     Medicare total (add KX at $2000)         BWC time in/ time out:    TE time in /out    Manual time in/out    Neuro re ed time in/out    Untimed minutes        [] EVAL (LOW) 35526   [] EVAL (MOD) 73635   [] EVAL (HIGH) 84114   [] RE-EVAL     [x] DI(88015) x  1  [] IONTO  [x] NMR (71053) x 1    [] VASO  [x] Manual (13298) x  1   [] Other:  [] TA x      [] Mech Traction (30683)  [] ES(attended) (59006)      [] ES (un) (61478):     Goals:  (cut and paste from eval)  Patient stated goal: Reduce pain/ get stronger  []? Progressing: []? Met: []? Not Met: []? Adjusted     Therapist goals for Patient:   Short Term Goals: To be achieved in: 2 weeks  1. Independent in HEP and progression per patient tolerance, in order to prevent re-injury. []? Progressing: []? Met: []? Not Met: []? Adjusted  2. Patient will have a decrease in pain to facilitate improvement in movement, function, and ADLs as indicated by Functional Deficits. []? Progressing: []? Met: []? Not Met: []? Adjusted        Long Term Goals: To be achieved in: 12 weeks  1. Disability index score of 19% or less for the Modified Oswestry  to assist with reaching prior level of function. []? Progressing: []? Met: []? Not Met: []? Adjusted  2.  Patient will presents with limited lumbar spine mobility, pain with movement, Decreased LE flexibility and decreased core strength. Patient's care giving responsibilities may be contributing to pain in lumbar spine and has decreased awareness on proper mechanics to minimize increased stress on spine. Pt requires skilled intervention to restore ROM, strength, functional endurance and balance in order to perform ADLs without significant symptoms or limitations. Return to Play: (if applicable)   []  Stage 1: Intro to Strength   []  Stage 2: Return to Run and Strength   []  Stage 3: Return to Jump and Strength   []  Stage 4: Dynamic Strength and Agility   []  Stage 5: Sport Specific Training     []  Ready to Return to Play, Meets All Above Stages   []  Not Ready for Return to Sports   Comments:                               PLAN: See eval. Progress lumbar stab and core strengthening. [x] Continue per plan of care [] Alter current plan (see comments above)  [] Plan of care initiated [] Hold pending MD visit [] Discharge      Electronically signed by:  Yuval Lang, [address]    Note: If patient does not return for scheduled/ recommended follow up visits, this note will serve as a discharge from care along with most recent update on progress.

## 2021-06-01 ENCOUNTER — HOSPITAL ENCOUNTER (OUTPATIENT)
Dept: PHYSICAL THERAPY | Age: 86
Setting detail: THERAPIES SERIES
Discharge: HOME | End: 2021-06-01
Payer: MEDICARE

## 2021-06-01 PROCEDURE — 97112 NEUROMUSCULAR REEDUCATION: CPT | Performed by: PHYSICAL THERAPIST

## 2021-06-01 PROCEDURE — 97110 THERAPEUTIC EXERCISES: CPT | Performed by: PHYSICAL THERAPIST

## 2021-06-01 PROCEDURE — 97140 MANUAL THERAPY 1/> REGIONS: CPT | Performed by: PHYSICAL THERAPIST

## 2021-06-01 NOTE — FLOWSHEET NOTE
[facility],  [address]  Phone: [phone]  Fax [phone]    Physical Therapy Treatment Note/ Progress Report:           Date:  2021    Patient Name:  Cristina Vazquez    :  1935  MRN: [id number]  Restrictions/Precautions:  Lumbar DDD/ scoliosis   Medical/Treatment Diagnosis Information:  · Diagnosis: M54.5 (ICD-10-CM) - Lumbar spine pain,M41.9 (ICD-10-CM) - Scoliosis of lumbar spine, unspecified scoliosis type, M54.6 (ICD-10-CM) - Acute midline thoracic back pain, M51.36 (ICD-10-CM) - DDD (degenerative disc disease), lumbar  · Treatment Diagnosis: M54.5 (ICD-10-CM) - Lumbar spine pain,M41.9 (ICD-10-CM) - Scoliosis of lumbar spine, unspecified scoliosis type, M54.6 (ICD-10-CM) - Acute midline thoracic back pain, M51.36 (ICD-10-CM) - DDD (degenerative disc disease), lumbar  Insurance/Certification information:  PT Insurance Information: [facility]/ [facility]/ Needs Cohere Auth  Physician Information:   Dr. Kathy Epps  Has the plan of care been signed (Y/N):        []  Yes  [x]  No     Date of Patient follow up with Physician: To F/U with Lenora Virk on 21 for MRI results. Is this a Progress Report:     []  Yes  [x]  No        If Yes:  Date Range for reporting period:  Beginning21  Endin/10/21    Progress report will be due (10 Rx or 30 days whichever is less): 3/65/73       Recertification will be due (POC Duration  / 90 days whichever is less): 12 weeks.  21        Visit # Insurance Allowable Auth Required   In-person  [facility]  24 visits between 21-8/10/21 [x]  Yes Cohere []  No    Telehealth   []  Yes []  No    Total            Functional Scale: Modified Oswestry:  28%    Date assessed:  21      Therapy Diagnosis/Practice Pattern:4F      Number of Comorbidities:  []0     [x]1-2    []3+    Latex Allergy:  [x]NO      []YES  Preferred Language for Healthcare: [x]English       []other:      Pain level:  3-4/10    SUBJECTIVE: F/U with Yadi on 6/7/21 for MRI results. Reports no issues in general from last visit. Exercises seem to be helping in general.     OBJECTIVE: Reviewed proper body mechanics for weeding and /or using garden stool if weeding. Tight quads and hip flexors.  Observation:    Test measurements:    OBJECTIVE  Test used 5/11/21 Current Score   Pain Summary  2-4/10    Functional questionnaire Modified Oswestry 28%    Functional Testing Lumbar Flex 45 P     Lumbar Ext 5 P    ROM Lateral Flexion R/L Limited 50% with P to R      Hip abd  4-/5    Strength Hip flexors 4-/5     Hip IR 4-/5     TrA with stabilizer 42-44lbs        RESTRICTIONS/PRECAUTIONS:  MRI to be scheduled. AP and lateral lumbar spine films were obtained today and does show degenerative scoliosis with multilevel lumbar degenerative disc disease and facet arthropathy.  I see no evidence of obvious acute compression fracture with endplate spurring noted.     Relevant Medical History: Previous PT for back several years ago. HTN, hyperlipidemia,( No known issues with osteopenia/Osteoporosis  Exercises/Interventions:     Therapeutic Ex (87986) Sets/sec Reps Notes/CUES   Supine piriformis stretches H30 5 HEP   Supine H/S stretches with strap H30 5 HEP   LTR HEP         TrA with tactile cues on ASIS H10 10 reps HEP   TrA with hip adduction with ball H5 x10 reps HEP         TrA with stabilizer  H5 10 reps Started at 42 lb and went to 44 lb   Bridges H5 2x10 reps + to HEP   Bike 5'     Incline stretches H30 3 reps    Clams  H5 2x10 reps    Manual Intervention (10701)      Lumbar paraspinals/ Prone knee flexion stretches.   10' Try pillow under stomach N.V.         + Hip flexor/quad stretches  H30 3 reps + to HEP                     NMR re-education (36659)   CUES NEEDED   Patient education on proper body mechanics with lifting w/c, with bending and utilizing TrA 10' Therapeutic Activity (32590)                                            Access Code: [email or web address]   URL: [email or web address]  Date: 05/11/2021  Prepared by: Nevaeh Karma     Exercises  Supine Piriformis Stretch with Foot on Ground - 2 x daily - 7 x weekly - 5 reps - 1 sets - 30 hold  Supine Lower Trunk Rotation - 2 x daily - 7 x weekly - 10 reps - 1-2 sets - 10 hold  Supine Hamstring Stretch - 2 x daily - 7 x weekly - 5 reps - 1 sets - 30 hold  Supine Hip Adduction Isometric with Ball - 3 x daily - 7 x weekly - 10 reps - 10 hold  Supine Transversus Abdominis Bracing - Hands on Stomach - 3 x daily - 7 x weekly - 10 reps - 10 hold     Access Code: [email or web address]  URL: [email or web address]  Date: 06/01/2021  Prepared by: Nevaeh Karma    Exercises  Supine Bridge with Siegel Soup Between Knees - 1 x daily - 7 x weekly - 2-3 sets - 10 reps - 5-10 hold  Supine Quadriceps Stretch with Strap on Table - 1 x daily - 7 x weekly - 1 sets - 5 reps - 30 hold  Clamshell - 1 x daily - 7 x weekly - 2-3 sets - 10 reps - 5 hold    Therapeutic Exercise and NMR EXR  [x] (16585) Provided verbal/tactile cueing for activities related to strengthening, flexibility, endurance, ROM  for improvements in proximal hip and core control with self care, mobility, lifting and ambulation. [x] (59977) Provided verbal/tactile cueing for activities related to improving balance, coordination, kinesthetic sense, posture, motor skill, proprioception  to assist with core control in self care, mobility, lifting, and ambulation.      Therapeutic Activities:    [] (94045 or 39912) Provided verbal/tactile cueing for activities related to improving balance, coordination, kinesthetic sense, posture, motor skill, proprioception and motor activation to allow for proper function  with self care and ADLs  [] (19181) Provided training and instruction to the patient for proper core and proximal hip recruitment and positioning with ambulation by Functional Deficits. []? Progressing: []? Met: []? Not Met: []? Adjusted        Long Term Goals: To be achieved in: 12 weeks  1. Disability index score of 19% or less for the Modified Oswestry  to assist with reaching prior level of function. []? Progressing: []? Met: []? Not Met: []? Adjusted  2. Patient will demonstrate increased AROM to WNL, good LS mobility, good hip ROM to allow for proper joint functioning as indicated by patients Functional Deficits. []? Progressing: []? Met: []? Not Met: []? Adjusted  3. Patient will demonstrate an increase in Strength to good proximal hip and core activation to allow for proper functional mobility as indicated by patients Functional Deficits. []? Progressing: []? Met: []? Not Met: []? Adjusted  4. Patient will return to standing in kitchen to make dinner  functional activities without increased symptoms or restriction. []? Progressing: []? Met: []? Not Met: []? Adjusted  5. Patient to report 25%-50% pain reduction with lifting w/c into car when transporting .(patient specific functional goal)    []? Progressing: []? Met: []? Not Met: []? Adjusted        Progression Towards Functional goals:  [] Patient is progressing as expected towards functional goals listed. [] Progression is slowed due to complexities listed. [] Progression has been slowed due to co-morbidities. [x] Plan just implemented, too soon to assess goals progression  [] Other:     Overall Progression Towards Functional goals/ Treatment Progress Update:  [] Patient is progressing as expected towards functional goals listed. [] Progression is slowed due to complexities/Impairments listed. [] Progression has been slowed due to co-morbidities.   [x] Plan just implemented, too soon to assess goals progression <30days   [] Goals require adjustment due to lack of progress  [] Patient is not progressing as expected and requires additional follow up with physician  [] Other    Prognosis for POC: [x] Good [] Fair  [] Poor      Patient requires continued skilled intervention: [x] Yes  [] No    Treatment/Activity Tolerance:  [x] Patient able to complete treatment  [] Patient limited by fatigue  [] Patient limited by pain    [] Patient limited by other medical complications  [] Other:     ASSESSMENT: Patient presents with limited lumbar spine mobility, pain with movement, Decreased LE flexibility and decreased core strength. Patient's care giving responsibilities may be contributing to pain in lumbar spine and has decreased awareness on proper mechanics to minimize increased stress on spine. Improvement noted with quad flexibility this visit. Noticed pain after stretching with turning over on plinth. Pt requires skilled intervention to restore ROM, strength, functional endurance and balance in order to perform ADLs without significant symptoms or limitations. Return to Play: (if applicable)   []  Stage 1: Intro to Strength   []  Stage 2: Return to Run and Strength   []  Stage 3: Return to Jump and Strength   []  Stage 4: Dynamic Strength and Agility   []  Stage 5: Sport Specific Training     []  Ready to Return to Play, Meets All Above Stages   []  Not Ready for Return to Sports   Comments:                               PLAN: See eval. Progress lumbar stab and core strengthening. [x] Continue per plan of care [] Alter current plan (see comments above)  [] Plan of care initiated [] Hold pending MD visit [] Discharge      Electronically signed by:  Nadya Payne, [address]    Note: If patient does not return for scheduled/ recommended follow up visits, this note will serve as a discharge from care along with most recent update on progress.

## 2021-06-07 ENCOUNTER — OFFICE VISIT (OUTPATIENT)
Dept: ORTHOPEDIC SURGERY | Age: 86
End: 2021-06-07
Payer: MEDICARE

## 2021-06-07 ENCOUNTER — TELEPHONE (OUTPATIENT)
Dept: ORTHOPEDIC SURGERY | Age: 86
End: 2021-06-07

## 2021-06-07 VITALS — WEIGHT: 150 LBS | BODY MASS INDEX: 27.6 KG/M2 | HEIGHT: 62 IN

## 2021-06-07 DIAGNOSIS — M51.36 DDD (DEGENERATIVE DISC DISEASE), LUMBAR: ICD-10-CM

## 2021-06-07 DIAGNOSIS — M54.6 THORACIC BACK PAIN, UNSPECIFIED BACK PAIN LATERALITY, UNSPECIFIED CHRONICITY: Primary | ICD-10-CM

## 2021-06-07 DIAGNOSIS — M41.9 SCOLIOSIS OF THORACOLUMBAR SPINE, UNSPECIFIED SCOLIOSIS TYPE: ICD-10-CM

## 2021-06-07 PROCEDURE — G8427 DOCREV CUR MEDS BY ELIG CLIN: HCPCS | Performed by: PHYSICIAN ASSISTANT

## 2021-06-07 PROCEDURE — G8417 CALC BMI ABV UP PARAM F/U: HCPCS | Performed by: PHYSICIAN ASSISTANT

## 2021-06-07 PROCEDURE — 1036F TOBACCO NON-USER: CPT | Performed by: PHYSICIAN ASSISTANT

## 2021-06-07 PROCEDURE — 99214 OFFICE O/P EST MOD 30 MIN: CPT | Performed by: PHYSICIAN ASSISTANT

## 2021-06-07 PROCEDURE — G8400 PT W/DXA NO RESULTS DOC: HCPCS | Performed by: PHYSICIAN ASSISTANT

## 2021-06-07 PROCEDURE — 1123F ACP DISCUSS/DSCN MKR DOCD: CPT | Performed by: PHYSICIAN ASSISTANT

## 2021-06-07 PROCEDURE — 4040F PNEUMOC VAC/ADMIN/RCVD: CPT | Performed by: PHYSICIAN ASSISTANT

## 2021-06-07 PROCEDURE — 1090F PRES/ABSN URINE INCON ASSESS: CPT | Performed by: PHYSICIAN ASSISTANT

## 2021-06-07 NOTE — PROGRESS NOTES
New Patient: SPINE    6/7/2021     CHIEF COMPLAINT:    Chief Complaint   Patient presents with    New Patient     tr mri lumbar       HISTORY OF PRESENT ILLNESS:              The patient is a 81 y.o. female history of hypertension referred by Dr. Mikayla Álvarez MD for back pain. She reports a 4 to 5-year history of atraumatic intermittent thoracolumbar pain. She reports pain across her mid thoracic spine and also right greater than left low back pain. She also has chronic numbness in her feet bilaterally. She states she was diagnosed with idiopathic peripheral neuropathy. Her pain is increased with any prolonged activity, walking standing. She reports relief with sitting and resting. Conservative care includes 4 visits of PT, MDP and meloxicam which she discontinued due to diarrhea. She does report approximately 50% improvement at this time. She believes much of her thoracic discomfort is due to being large chested. She was previously scheduled for breast reduction but canceled. She currently denies any upper or lower extremity radiating pain. She denies any progressive or new numbness tingling weakness. No recent bowel or bladder dysfunction or saddle anesthesia. No recent injury or trauma. No recent fevers chills or infections.   Past Medical History:   Diagnosis Date    Back pain     GERD (gastroesophageal reflux disease)     Hyperlipidemia     Hypertension       Current/Past Treatment:   · Physical Therapy: 4 visit  · Chiropractic:     · Injection:     Medications:            NSAIDS: Meloxicam            Muscle relaxer:              Steriods:   MDP discontinued due to diarrhea            Neuropathic medications:              Opioids:            Other:   · Surgery/Consult: No    Past Medical History: Medical history form was reviewed today & scanned into the media tab  Past Medical History:   Diagnosis Date    Back pain     GERD (gastroesophageal reflux disease)     Hyperlipidemia     Hypertension       Past Surgical History:     Past Surgical History:   Procedure Laterality Date    CARPAL TUNNEL RELEASE Right 10/1/2019    RIGHT OPEN CARPAL TUNNEL RELEASE performed by Esme [facility] MD at [facility] WITH IMPLANT Bilateral     CHOLECYSTECTOMY      HYSTERECTOMY       Current Medications:     Current Outpatient Medications:     Cholecalciferol 400 UNIT TABS tablet, Take 400 Units by mouth daily, Disp: , Rfl:     Coenzyme Q10-Vitamin E 100-10 MG-UNIT CAPS, Take by mouth, Disp: , Rfl:     Omega-3 Fatty Acids (OMEGA-3 CF PO), Take by mouth, Disp: , Rfl:     methylPREDNISolone (MEDROL DOSEPACK) 4 MG tablet, Take by mouth as directed., Disp: 21 kit, Rfl: 0    meloxicam (MOBIC) 15 MG tablet, TAKE 1 TABLET BY MOUTH DAILY, Disp: 90 tablet, Rfl: 3    meloxicam (MOBIC) 15 MG tablet, Take 1 tablet by mouth daily, Disp: 30 tablet, Rfl: 3    rosuvastatin (CRESTOR) 20 MG tablet, TK 1 T PO QPM, Disp: , Rfl: 3    olmesartan-amLODIPine-HCTZ 40-5-25 MG TABS, daily , Disp: , Rfl:     cetirizine (ZYRTEC ALLERGY) 10 MG tablet, Zyrtec 10 mg tablet  Take 1 tablet every day by oral route., Disp: , Rfl:   Allergies:  Atenolol, Bisoprolol, Erythromycin, Levofloxacin, Penicillins, Sulfa antibiotics, and Verapamil  Social History:    reports that she has never smoked. She has never used smokeless tobacco. She reports current alcohol use of about 3.0 standard drinks of alcohol per week. She reports that she does not use drugs.   Family History:   Family History   Problem Relation Age of Onset    Kidney Disease Father     Heart Attack Brother     Heart Disease Brother     Alzheimer's Disease Brother     Parkinsonism Brother        REVIEW OF SYSTEMS: Full ROS reviewed & scanned into chart  CONSTITUTIONAL: Denies unexplained weight loss, fevers   SKIN: Denies skin conditions, skin cancer  ENT: Denies Headaches, dizziness, nosebleeds  RESPIRATORY: Denies current dyspnea--at times has difficulty when climbing stairs  CARDIOVASCULAR: Denies chest pain, dyspnea  NEUROLOGICAL: Denies stroke, unsteady gait or progressive weakness  PSYCHOLOGICAL: Denies anxiety, depression   HEMATOLOGIC: Denies blood disorders, cancer  ENDOCRINE: Denies excessive thirst, urination, heat/cold  GI: Denies ulcer, nausea, vomiting, diarrhea   : Denies bowel or bladder incontinence       PHYSICAL EXAM:    Vitals: Height 5' 2\" (1.575 m), weight 150 lb (68 kg). Pain score 7/10    GENERAL EXAM:  · General Apparence: Patient is adequately groomed with no evidence of malnutrition. · Orientation: The patient is oriented to time, place and person. · Mood & Affect:The patient's mood and affect are appropriate   · Lymphatic: The lymphatic examination bilaterally reveals all areas to be without enlargement or induration  · Sensation: Sensation is intact without deficit  CERVICAL EXAMINATION:  · Inspection: Local inspection shows no step-off or bruising. Cervical alignment is normal.     · Range of Motion: Intact flexion mild loss extension  · Strength: 5/5 bilateral upper extremities   · Special Tests:    ·   Spurling's, L'Hermitte's & Marcus's negative bilaterally. · Skin:There are no rashes, ulcerations or lesions in right & left upper extremities. · Reflexes: Bilaterally triceps, biceps and brachioradialis are 1+. Clonus absent bilaterally at the feet. · Additional Examinations:       · RIGHT UPPER EXTREMITY:  Inspection/examination of the right upper extremity does not show any tenderness, deformity or injury. Range of motion is full. There is no gross instability. There are no rashes, ulcerations or lesions. Strength and tone are normal.  · LEFT UPPER EXTREMITY: Inspection/examination of the left upper extremity does not show any tenderness, deformity or injury. Range of motion is full. There is no gross instability. There are no rashes, ulcerations or lesions.  Strength and tone are normal.    LUMBAR/SACRAL finishing out a course of physical therapy. She is currently declining a thoracic MRI scan but may call if wishing to proceed. 4) We also briefly discussed considering right L5-S1 LESI #1 if her low back pain worsens.       Electronically signed by Moises Todd PA-C, MPAS on 6/7/2021 at 10:30 AM     Moises Todd PA-C, [address] [facility] Certified by the [facility]

## 2021-06-07 NOTE — TELEPHONE ENCOUNTER
Patient was called & spoken too I informed her I gave her the wrong PT script and she will get the correct one tomorrow.

## 2021-06-08 ENCOUNTER — HOSPITAL ENCOUNTER (OUTPATIENT)
Dept: PHYSICAL THERAPY | Age: 86
Setting detail: THERAPIES SERIES
Discharge: HOME | End: 2021-06-08
Payer: MEDICARE

## 2021-06-08 PROCEDURE — 97112 NEUROMUSCULAR REEDUCATION: CPT | Performed by: PHYSICAL THERAPIST

## 2021-06-08 PROCEDURE — 97110 THERAPEUTIC EXERCISES: CPT | Performed by: PHYSICAL THERAPIST

## 2021-06-08 PROCEDURE — 97140 MANUAL THERAPY 1/> REGIONS: CPT | Performed by: PHYSICAL THERAPIST

## 2021-06-08 NOTE — FLOWSHEET NOTE
[facility], [address]  Phone: [phone]  Fax [phone]    Physical Therapy Treatment Note/ Progress Report:           Date:  2021    Patient Name:  Aamir Garcia    :  1935  MRN: [id number]  Restrictions/Precautions:  Lumbar DDD/ scoliosis   Medical/Treatment Diagnosis Information:  · Diagnosis: M54.5 (ICD-10-CM) - Lumbar spine pain,M41.9 (ICD-10-CM) - Scoliosis of lumbar spine, unspecified scoliosis type, M54.6 (ICD-10-CM) - Acute midline thoracic back pain, M51.36 (ICD-10-CM) - DDD (degenerative disc disease), lumbar  · Treatment Diagnosis: M54.5 (ICD-10-CM) - Lumbar spine pain,M41.9 (ICD-10-CM) - Scoliosis of lumbar spine, unspecified scoliosis type, M54.6 (ICD-10-CM) - Acute midline thoracic back pain, M51.36 (ICD-10-CM) - DDD (degenerative disc disease), lumbar  Insurance/Certification information:  PT Insurance Information: [facility]/ [facility]/ Needs Cohere Auth  Physician Information:   Dr. Judge Pelaez  Has the plan of care been signed (Y/N):        []  Yes  [x]  No     Date of Patient follow up with Physician: To F/U with Antione Haq on 21 for MRI results. Is this a Progress Report:     []  Yes  [x]  No        If Yes:  Date Range for reporting period:  Beginnin21  Endin21    Progress report will be due (10 Rx or 30 days whichever is less): 3/1/28      Recertification will be due (POC Duration  / 90 days whichever is less): 12 weeks.  21        Visit # Insurance Allowable Auth Required   In-person  [facility]/ [facility]  24 visits between 21-8/10/21 [x]  Yes Cohere []  No    Telehealth   []  Yes []  No    Total            Functional Scale: Modified Oswestry:  22%    Date assessed:  11      Therapy Diagnosis/Practice Pattern:4F      Number of Comorbidities:  []0     [x]1-2    []3+    Latex Allergy:  [x]NO      []YES  Preferred Language for Healthcare: [x]English       []other:      Pain level:  3-4/10    SUBJECTIVE: F/U with Yadi on 6/7/21 for MRI results. To continue with PT and to call her if she would like to proceed with BONNIE. Overall about a 50% improvement with pain and functional activities. OBJECTIVE: Reviewed proper body mechanics for weeding and /or using garden stool if weeding. Tight quads and hip flexors.  Observation:    Test measurements:    OBJECTIVE  Test used 5/11/21 6/8/21   Pain Summary  2-4/10 2-4/10   Functional questionnaire Modified Oswestry 28% 22%   Functional Testing Lumbar Flex 45 P 45 sl p    Lumbar Ext 5 P 10 no P   ROM Lateral Flexion R/L Limited 50% with P to R  WFL without pain, just pulling    Hip abd  4-/5 4/5   Strength Hip flexors 4-/5 4-/5    Hip IR 4-/5 4/5    TrA with stabilizer 42-44lbs 42-44lbs       RESTRICTIONS/PRECAUTIONS:  MRI to be scheduled. AP and lateral lumbar spine films were obtained today and does show degenerative scoliosis with multilevel lumbar degenerative disc disease and facet arthropathy.  I see no evidence of obvious acute compression fracture with endplate spurring noted.     Relevant Medical History: Previous PT for back several years ago. HTN, hyperlipidemia,( No known issues with osteopenia/Osteoporosis  Exercises/Interventions:     Therapeutic Ex (67439) Sets/sec Reps Notes/CUES   Supine piriformis stretches H30 5 HEP   Supine H/S stretches with strap H30 5 HEP   LTR HEP         TrA with tactile cues on ASIS H10 10 reps HEP   TrA with hip adduction with ball H5 x10 reps HEP         TrA with stabilizer  H5 10 reps Started at 42 lb and went to 44 lb   Bridges with pilates ring H5 2x10 reps + to HEP   Bike 5'     Incline stretches H30 3 reps    Clams  H5 2x10 reps    Manual Intervention (58920)      Lumbar paraspinals/ Prone knee flexion stretches.   10'          + Hip flexor/quad stretches  H30 3 reps + to HEP                     NMR re-education (77659)   CUES NEEDED   Patient education on Adjusted  2. Patient will have a decrease in pain to facilitate improvement in movement, function, and ADLs as indicated by Functional Deficits. [x]? Progressing: []? Met: []? Not Met: []? Adjusted        Long Term Goals: To be achieved in: 12 weeks  1. Disability index score of 19% or less for the Modified Oswestry  to assist with reaching prior level of function. [x]? Progressing: []? Met: []? Not Met: []? Adjusted  2. Patient will demonstrate increased AROM to WNL, good LS mobility, good hip ROM to allow for proper joint functioning as indicated by patients Functional Deficits. [x]? Progressing: []? Met: []? Not Met: []? Adjusted  3. Patient will demonstrate an increase in Strength to good proximal hip and core activation to allow for proper functional mobility as indicated by patients Functional Deficits. [x]? Progressing: []? Met: []? Not Met: []? Adjusted  4. Patient will return to standing in kitchen to make dinner  functional activities without increased symptoms or restriction. []? Progressing: [x]? Met: []? Not Met: []? Adjusted  5. Patient to report 25%-50% pain reduction with lifting w/c into car when transporting .(patient specific functional goal)    [x]? Progressing: []? Met: []? Not Met: []? Adjusted        Progression Towards Functional goals:  [x] Patient is progressing as expected towards functional goals listed. [] Progression is slowed due to complexities listed. [] Progression has been slowed due to co-morbidities. [] Plan just implemented, too soon to assess goals progression  [] Other:     Overall Progression Towards Functional goals/ Treatment Progress Update:  [x] Patient is progressing as expected towards functional goals listed. [] Progression is slowed due to complexities/Impairments listed. [] Progression has been slowed due to co-morbidities.   [] Plan just implemented, too soon to assess goals progression <30days   [] Goals require adjustment due to lack of progress  [] Patient is not progressing as expected and requires additional follow up with physician  [] Other    Prognosis for POC: [x] Good [] Fair  [] Poor      Patient requires continued skilled intervention: [x] Yes  [] No    Treatment/Activity Tolerance:  [x] Patient able to complete treatment  [] Patient limited by fatigue  [] Patient limited by pain    [] Patient limited by other medical complications  [] Other:     ASSESSMENT: Patient presents with limited lumbar spine mobility, pain with movement, Decreased LE flexibility and decreased core strength. Patient's care giving responsibilities may be contributing to pain in lumbar spine and has decreased awareness on proper mechanics to minimize increased stress on spine. Improvement noted with quad flexibility this visit and lumbar AROM without pain with R lateral flexion and extension. Noticed overall 50% improvement. Pt requires skilled intervention to restore ROM, strength, functional endurance and balance in order to perform ADLs without significant symptoms or limitations. Return to Play: (if applicable)   []  Stage 1: Intro to Strength   []  Stage 2: Return to Run and Strength   []  Stage 3: Return to Jump and Strength   []  Stage 4: Dynamic Strength and Agility   []  Stage 5: Sport Specific Training     []  Ready to Return to Play, Meets All Above Stages   []  Not Ready for Return to Sports   Comments:                               PLAN: See eval. Progress lumbar stab and core strengthening. SB stabilization exercises N.V.  [x] Continue per plan of care [] Alter current plan (see comments above)  [] Plan of care initiated [] Hold pending MD visit [] Discharge      Electronically signed by:  Cassie Becerril, [address]    Note: If patient does not return for scheduled/ recommended follow up visits, this note will serve as a discharge from care along with most recent update on progress.

## 2021-06-15 ENCOUNTER — HOSPITAL ENCOUNTER (OUTPATIENT)
Dept: PHYSICAL THERAPY | Age: 86
Setting detail: THERAPIES SERIES
Discharge: HOME | End: 2021-06-15
Payer: MEDICARE

## 2021-06-15 NOTE — FLOWSHEET NOTE
[facility],  [address]        Physical Therapy  Cancellation/No-show Note  Patient Name:  Cristiane Ruiz  :  1935   Date:  6/15/2021  Cancelled visits to date: 1  No-shows to date: 0    For today's appointment patient:  [x]  Cancelled  []  Rescheduled appointment  []  No-show     Reason given by patient:  [x]  Patient ill  []  Conflicting appointment  []  No transportation    []  Conflict with work  []  No reason given  []  Other:     Comments:      Electronically signed by:  Desean Jeffery, [address]

## 2021-08-09 ENCOUNTER — OFFICE VISIT (OUTPATIENT)
Dept: ORTHOPEDIC SURGERY | Age: 86
End: 2021-08-09
Payer: MEDICARE

## 2021-08-09 VITALS — HEIGHT: 62 IN | WEIGHT: 150 LBS | BODY MASS INDEX: 27.6 KG/M2

## 2021-08-09 DIAGNOSIS — M51.36 DDD (DEGENERATIVE DISC DISEASE), LUMBAR: Primary | ICD-10-CM

## 2021-08-09 DIAGNOSIS — M41.9 SCOLIOSIS OF LUMBAR SPINE, UNSPECIFIED SCOLIOSIS TYPE: ICD-10-CM

## 2021-08-09 DIAGNOSIS — M54.50 LUMBAR SPINE PAIN: ICD-10-CM

## 2021-08-09 PROCEDURE — 1123F ACP DISCUSS/DSCN MKR DOCD: CPT | Performed by: PHYSICIAN ASSISTANT

## 2021-08-09 PROCEDURE — 1036F TOBACCO NON-USER: CPT | Performed by: PHYSICIAN ASSISTANT

## 2021-08-09 PROCEDURE — G8427 DOCREV CUR MEDS BY ELIG CLIN: HCPCS | Performed by: PHYSICIAN ASSISTANT

## 2021-08-09 PROCEDURE — G8400 PT W/DXA NO RESULTS DOC: HCPCS | Performed by: PHYSICIAN ASSISTANT

## 2021-08-09 PROCEDURE — G8417 CALC BMI ABV UP PARAM F/U: HCPCS | Performed by: PHYSICIAN ASSISTANT

## 2021-08-09 PROCEDURE — 99214 OFFICE O/P EST MOD 30 MIN: CPT | Performed by: PHYSICIAN ASSISTANT

## 2021-08-09 PROCEDURE — 4040F PNEUMOC VAC/ADMIN/RCVD: CPT | Performed by: PHYSICIAN ASSISTANT

## 2021-08-09 PROCEDURE — 1090F PRES/ABSN URINE INCON ASSESS: CPT | Performed by: PHYSICIAN ASSISTANT

## 2021-08-09 NOTE — PROGRESS NOTES
FOLLOW UP: SPINE    8/9/2021     CHIEF COMPLAINT:    Chief Complaint   Patient presents with    Follow-up     check lumbar       HISTORY OF PRESENT ILLNESS:              The patient is a 81 y.o. female history of hypertension here to follow up for back pain. She reports a 5-year history of atraumatic intermittent low back pain. She also reports pain across her mid thoracic spine. She also has chronic numbness in her feet bilaterally--previously diagnosed with idiopathic peripheral neuropathy. Her pain is increased with any prolonged activity, walking standing. She reports relief with sitting and resting. Conservative care includes PT, MDP and meloxicam.  She does report some improvement at this time but still with daily symptoms. She believes much of her thoracic discomfort is due to being large chested. She was previously scheduled for breast reduction but canceled. She also previously had shingles which is now resolved. She currently denies any upper or lower extremity radiating pain. She denies any progressive or new numbness tingling weakness. No recent bowel or bladder dysfunction or saddle anesthesia. No recent injury or trauma.     Current/Past Treatment:   · Physical Therapy: Yes  · Chiropractic:     · Injection:     Medications:            NSAIDS: Meloxicam            Muscle relaxer:              Steriods:   MDP discontinued due to diarrhea            Neuropathic medications:              Opioids:            Other:   · Surgery/Consult: No    Past Medical History: Medical history form was reviewed today & scanned into the media tab  Past Medical History:   Diagnosis Date    Back pain     GERD (gastroesophageal reflux disease)     Hyperlipidemia     Hypertension       Past Surgical History:     Past Surgical History:   Procedure Laterality Date    CARPAL TUNNEL RELEASE Right 10/1/2019    RIGHT OPEN CARPAL TUNNEL RELEASE performed by Von Trevino MD at [address] [facility] [facility] Bilateral     CHOLECYSTECTOMY      HYSTERECTOMY       Current Medications:     Current Outpatient Medications:     Cholecalciferol 400 UNIT TABS tablet, Take 400 Units by mouth daily, Disp: , Rfl:     Coenzyme Q10-Vitamin E 100-10 MG-UNIT CAPS, Take by mouth, Disp: , Rfl:     Omega-3 Fatty Acids (OMEGA-3 CF PO), Take by mouth, Disp: , Rfl:     methylPREDNISolone (MEDROL DOSEPACK) 4 MG tablet, Take by mouth as directed., Disp: 21 kit, Rfl: 0    meloxicam (MOBIC) 15 MG tablet, TAKE 1 TABLET BY MOUTH DAILY, Disp: 90 tablet, Rfl: 3    meloxicam (MOBIC) 15 MG tablet, Take 1 tablet by mouth daily, Disp: 30 tablet, Rfl: 3    rosuvastatin (CRESTOR) 20 MG tablet, TK 1 T PO QPM, Disp: , Rfl: 3    olmesartan-amLODIPine-HCTZ 40-5-25 MG TABS, daily , Disp: , Rfl:     cetirizine (ZYRTEC ALLERGY) 10 MG tablet, Zyrtec 10 mg tablet  Take 1 tablet every day by oral route., Disp: , Rfl:   Allergies:  Atenolol, Bisoprolol, Erythromycin, Levofloxacin, Penicillins, Sulfa antibiotics, and Verapamil  Social History:    reports that she has never smoked. She has never used smokeless tobacco. She reports current alcohol use of about 3.0 standard drinks of alcohol per week. She reports that she does not use drugs. Family History:   Family History   Problem Relation Age of Onset    Kidney Disease Father     Heart Attack Brother     Heart Disease Brother     Alzheimer's Disease Brother     Parkinsonism Brother        REVIEW OF SYSTEMS: Full ROS reviewed & scanned into chart  CONSTITUTIONAL: Denies unexplained weight loss, fevers   SKIN: Denies skin conditions, skin cancer       PHYSICAL EXAM:    Vitals: Height 5' 2\" (1.575 m), weight 150 lb (68 kg). Pain score 9/10    GENERAL EXAM:  · General Apparence: Patient is adequately groomed with no evidence of malnutrition. · Orientation: The patient is oriented to time, place and person.    · Mood & Affect:The patient's mood and affect are appropriate   · Lymphatic: The lymphatic examination bilaterally reveals all areas to be without enlargement or induration  · Sensation: Sensation is intact without deficit  LUMBAR/SACRAL EXAMINATION:  · Inspection: Local inspection shows no step-off or bruising. Significant thoracolumbar scoliosis with rightward rib hump      · Palpation:   No evidence of tenderness at the midline. · Range of Motion: Moderate loss of flexion moderate to severely limited extension  · Strength:   Strength testing is 5/5 in all muscle groups tested. · Special Tests:   Straight leg raise and crossed SLR negative. Leg length and pelvis level.  0 out of 5 Vane's signs. · Skin: well healed herpes zoster without active infection, left thoracolumbar  · Reflexes: Reflexes are symmetrically trace to 1+ at the patellar and ankle tendons. Clonus absent bilaterally at the feet. · Gait & station: Forward flexed unassisted  · Additional Examinations:   · RIGHT LOWER EXTREMITY: Inspection/examination of the right lower extremity does not show any tenderness, deformity or injury. Range of motion is full. There is no gross instability. There are no rashes, ulcerations or lesions. Strength and tone are normal.  · LEFT LOWER EXTREMITY:  Inspection/examination of the left lower extremity does not show any tenderness, deformity or injury. Range of motion is full. There is no gross instability. There are no rashes, ulcerations or lesions. Strength and tone are normal.    Diagnostic Testing:    Lumbar MRI scan report independently reviewed from 5/17/2021 showing scoliosis, right disc protrusion L3-4, multilevel facet arthropathy. There is no high-grade central stenosis. 2 views thoracic spine 6/7/2021 shows kyphoscoliosis with multilevel DDD/spondylosis.   No definitive fracture although lateral view is very skewed due to scoliotic curve      Impression:  1) Chronic low back pain   2) Scoliosis, multilevel thoracolumbar spondylosis  3) H/o HTN  4) Chronic foot numbness, h/o idiopathic peripheral neuropathy      Plan:   1) We had a long discussion. She wishes to proceed with right L5-S1 LESI #1. Procedure risk and benefits were discussed. Pamphlet provided for more information. We will order this with Dr. Beth Nelson.     2) Cont HEP    3) F/u 2wks after BONNIE       Electronically signed by Nancy Stephens PA-C, MPAS on 8/9/2021 at 4:07 PM     Nancy Stephens PA-C, [address] [place] [address] Certified by the [facility]

## 2021-08-09 NOTE — LETTER
[facility]    Please Schedule the following with: Miko    Today's Date:  8/9/21     Patient: Bren Anderson     YOB: 1935    Patient Home Phone: [phone] (home)    Diagnosis: Scoliosis, multilevel lumbar DDD, right disc protrusion L3-4    []LT     [x]RT     []JACKIE     []Midline    Levels: L5-S1 #1    []Cervical BONNIE 91705, 32350  []L-MBB 28069, 23185  []SI Joint 62668   []C-FACET 52404, 96429, 50660  []L-FACET [id number], [id number]  [x]Interlaminar BONNIE 55036     []HIP 16200    []C-MBB  []Transforaminal BONNIE 23021  []Neurotomy 79748, 52745, 49876    Attending Provider: Belle Mathews PA-C    Injection Schedule for: At: First Insurance:                                               Pre-cert #:  Second Insurance:                 Pre-cert #:    Comments: Pt to follow up with Kenyetta Lobato PAC 2 wks after BONNIE [phone]    SEDATION:       [] IV           [] ORAL    [] Blood Thinner:                 []Diabetic           []Antibiotic:               []Glaucoma:    [] Pacemaker/defib       [] Current Open Wounds, Lacerations or Sores     Allergies:    Allergies   Allergen Reactions    Atenolol      Stomach discomfort    Bisoprolol      Stomach problems    Erythromycin      Pt doesn't remember    Levofloxacin      Can't remember    Penicillins     Sulfa Antibiotics      Pt doesn't remember      Verapamil      Can't remember       Past Medical History:   Diagnosis Date    Back pain     GERD (gastroesophageal reflux disease)     Hyperlipidemia     Hypertension           Current Outpatient Medications:     Cholecalciferol 400 UNIT TABS tablet, Take 400 Units by mouth daily, Disp: , Rfl:     Coenzyme Q10-Vitamin E 100-10 MG-UNIT CAPS, Take by mouth, Disp: , Rfl:     Omega-3 Fatty Acids (OMEGA-3 CF PO), Take by mouth, Disp: , Rfl:     methylPREDNISolone (MEDROL DOSEPACK) 4 MG tablet, Take by mouth as directed., Disp: 21 kit, Rfl: 0    meloxicam (MOBIC) 15 MG tablet, TAKE 1 TABLET BY MOUTH DAILY, Disp: 90 tablet, Rfl: 3    meloxicam (MOBIC) 15 MG tablet, Take 1 tablet by mouth daily, Disp: 30 tablet, Rfl: 3    rosuvastatin (CRESTOR) 20 MG tablet, TK 1 T PO QPM, Disp: , Rfl: 3    olmesartan-amLODIPine-HCTZ 40-5-25 MG TABS, daily , Disp: , Rfl:     cetirizine (ZYRTEC ALLERGY) 10 MG tablet, Zyrtec 10 mg tablet  Take 1 tablet every day by oral route., Disp: , Rfl:

## 2021-08-26 ENCOUNTER — HOSPITAL ENCOUNTER (OUTPATIENT)
Age: 86
Setting detail: OUTPATIENT SURGERY
Discharge: HOME | End: 2021-08-26
Attending: PAIN MEDICINE | Admitting: PAIN MEDICINE
Payer: MEDICARE

## 2021-08-26 VITALS
HEART RATE: 80 BPM | DIASTOLIC BLOOD PRESSURE: 78 MMHG | OXYGEN SATURATION: 98 % | HEIGHT: 64 IN | BODY MASS INDEX: 26.29 KG/M2 | SYSTOLIC BLOOD PRESSURE: 130 MMHG | RESPIRATION RATE: 18 BRPM | WEIGHT: 154 LBS | TEMPERATURE: 97.9 F

## 2021-08-26 PROCEDURE — 7100000010 HC PHASE II RECOVERY - FIRST 15 MIN: Performed by: PAIN MEDICINE

## 2021-08-26 PROCEDURE — 6360000004 HC RX CONTRAST MEDICATION: Performed by: PAIN MEDICINE

## 2021-08-26 PROCEDURE — 62323 NJX INTERLAMINAR LMBR/SAC: CPT | Performed by: PAIN MEDICINE

## 2021-08-26 PROCEDURE — 3600000002 HC SURGERY LEVEL 2 BASE: Performed by: PAIN MEDICINE

## 2021-08-26 PROCEDURE — 6360000002 HC RX W HCPCS: Performed by: PAIN MEDICINE

## 2021-08-26 PROCEDURE — 2500000003 HC RX 250 WO HCPCS: Performed by: PAIN MEDICINE

## 2021-08-26 PROCEDURE — 2709999900 HC NON-CHARGEABLE SUPPLY: Performed by: PAIN MEDICINE

## 2021-08-26 RX ORDER — LIDOCAINE HYDROCHLORIDE 10 MG/ML
INJECTION, SOLUTION EPIDURAL; INFILTRATION; INTRACAUDAL; PERINEURAL PRN
Status: DISCONTINUED | OUTPATIENT
Start: 2021-08-26 | End: 2021-08-26 | Stop reason: ALTCHOICE

## 2021-08-26 RX ORDER — BETAMETHASONE SODIUM PHOSPHATE AND BETAMETHASONE ACETATE 3; 3 MG/ML; MG/ML
INJECTION, SUSPENSION INTRA-ARTICULAR; INTRALESIONAL; INTRAMUSCULAR; SOFT TISSUE
Status: DISCONTINUED
Start: 2021-08-26 | End: 2021-08-26 | Stop reason: HOSPADM

## 2021-08-26 ASSESSMENT — PAIN - FUNCTIONAL ASSESSMENT
PAIN_FUNCTIONAL_ASSESSMENT: 0-10
PAIN_FUNCTIONAL_ASSESSMENT: PREVENTS OR INTERFERES SOME ACTIVE ACTIVITIES AND ADLS

## 2021-08-26 ASSESSMENT — PAIN DESCRIPTION - DESCRIPTORS: DESCRIPTORS: STABBING;ACHING

## 2021-08-26 NOTE — PROCEDURES
Rosa Clarke is a 81 y.o. female patient. No diagnosis found. Past Medical History:   Diagnosis Date    Back pain     GERD (gastroesophageal reflux disease)     Hyperlipidemia     Hypertension     Peripheral neuropathy      Blood pressure 123/74, pulse 84, temperature 97.2 °F (36.2 °C), temperature source Temporal, resp. rate 16, height 5' 4\" (1.626 m), weight 154 lb (69.9 kg), SpO2 97 %. Procedures    Lazaro Reyes MD  8/26/2021  LUMBAR INTERLAMINAR EPIDURAL INJECTION AT   R L5S1        LEVEL. [address]406 with 19712  INDICATIONS:  Lumbar Radiculitis 724.4, M54.16    OPERATIVE PROCEDURE:  Consent was signed by the patient after the risks and benefits were explained. With the patient in the prone position, the back was prepped with Prevail and draped. Aseptic technique was used at every stage of the procedure. Skin infiltration was with 0.5 cc of 1% Lidocaine followed by placement of an _20__-gauge Touhy needle under fluoroscopic guidance in the epidural space which was maximised by fluoroscopic angulation. Aspiration was negative for CSF or blood . This was followed by injection of 2__cc of _LIDO  with _80__ mg DEPOMEDROL. The needle was pulled out intact. The patient tolerated the procedure well and discharge instructions were given. ESTIMATED BLOOD LOSS:  Less than 1 cc      Pulse Ox Monitoring was done. Omnipaque dye was / was not injected. Lateral view was / was not checked.

## 2021-09-13 ENCOUNTER — OFFICE VISIT (OUTPATIENT)
Dept: ORTHOPEDIC SURGERY | Age: 86
End: 2021-09-13
Payer: MEDICARE

## 2021-09-13 VITALS — WEIGHT: 154 LBS | BODY MASS INDEX: 26.29 KG/M2 | HEIGHT: 64 IN

## 2021-09-13 DIAGNOSIS — M41.9 SCOLIOSIS OF LUMBAR SPINE, UNSPECIFIED SCOLIOSIS TYPE: ICD-10-CM

## 2021-09-13 DIAGNOSIS — M51.36 DDD (DEGENERATIVE DISC DISEASE), LUMBAR: Primary | ICD-10-CM

## 2021-09-13 PROCEDURE — G8400 PT W/DXA NO RESULTS DOC: HCPCS | Performed by: PHYSICIAN ASSISTANT

## 2021-09-13 PROCEDURE — G8427 DOCREV CUR MEDS BY ELIG CLIN: HCPCS | Performed by: PHYSICIAN ASSISTANT

## 2021-09-13 PROCEDURE — 1123F ACP DISCUSS/DSCN MKR DOCD: CPT | Performed by: PHYSICIAN ASSISTANT

## 2021-09-13 PROCEDURE — 99213 OFFICE O/P EST LOW 20 MIN: CPT | Performed by: PHYSICIAN ASSISTANT

## 2021-09-13 PROCEDURE — G8417 CALC BMI ABV UP PARAM F/U: HCPCS | Performed by: PHYSICIAN ASSISTANT

## 2021-09-13 PROCEDURE — 4040F PNEUMOC VAC/ADMIN/RCVD: CPT | Performed by: PHYSICIAN ASSISTANT

## 2021-09-13 PROCEDURE — 1036F TOBACCO NON-USER: CPT | Performed by: PHYSICIAN ASSISTANT

## 2021-09-13 PROCEDURE — 1090F PRES/ABSN URINE INCON ASSESS: CPT | Performed by: PHYSICIAN ASSISTANT

## 2021-09-13 NOTE — PROGRESS NOTES
FOLLOW UP: SPINE    9/13/2021     CHIEF COMPLAINT:    Chief Complaint   Patient presents with    Follow-up     f/u lumbar BONNIE       HISTORY OF PRESENT ILLNESS:              The patient is a 81 y.o. female history of hypertension here to follow up after right L5-S1 LESI #1 8/26/2021 for back pain. She initially reported a 5-year history of atraumatic intermittent low back pain. She also reports pain across her mid thoracic spine. She also has chronic numbness in her feet bilaterally--previously diagnosed with idiopathic peripheral neuropathy. Her pain was increased with any prolonged activity, walking standing. She reports relief with sitting and resting. She currently reports 90% improvement with recent LESI with improved functionality. Other conservative care includes PT, MDP and meloxicam.  She still reports chronic underlying midthoracic pain. She believes much of her thoracic discomfort is due to being large chested; she was previously scheduled for breast reduction but canceled. She currently denies any upper or lower extremity radiating pain. She denies any progressive or new numbness tingling weakness. No recent bowel or bladder dysfunction or saddle anesthesia. No recent injury or trauma. Denies any side effects of the procedure. Overall doing well.   Current/Past Treatment:   · Physical Therapy: Yes  · Chiropractic:     · Injection:    · 8/26/2021 LUMBAR INTERLAMINAR EPIDURAL INJECTION AT   R G9U6--68%   Medications:            NSAIDS: Meloxicam            Muscle relaxer:              Steriods:   MDP discontinued due to diarrhea            Neuropathic medications:              Opioids:            Other:   · Surgery/Consult: No    Past Medical History: Medical history form was reviewed today & scanned into the media tab  Past Medical History:   Diagnosis Date    Back pain     GERD (gastroesophageal reflux disease)     Hyperlipidemia     Hypertension     Peripheral neuropathy       Past Surgical History:     Past Surgical History:   Procedure Laterality Date    CARPAL TUNNEL RELEASE Right 10/1/2019    RIGHT OPEN CARPAL TUNNEL RELEASE performed by Sola Lorenzo MD at [facility]. Bilateral     CHOLECYSTECTOMY      HYSTERECTOMY      PAIN MANAGEMENT PROCEDURE Right 8/26/2021    RIGHT LUMBAR FIVE SACRAL ONE EPIDURAL STEROID INJECTION SITE CONFIRMED BY FLUOROSCOPY performed by Sonya Duron MD at [place] [address]     Current Medications:     Current Outpatient Medications:     Cholecalciferol 400 UNIT TABS tablet, Take 400 Units by mouth daily, Disp: , Rfl:     Coenzyme Q10-Vitamin E 100-10 MG-UNIT CAPS, Take by mouth, Disp: , Rfl:     Omega-3 Fatty Acids (OMEGA-3 CF PO), Take by mouth, Disp: , Rfl:     methylPREDNISolone (MEDROL DOSEPACK) 4 MG tablet, Take by mouth as directed., Disp: 21 kit, Rfl: 0    meloxicam (MOBIC) 15 MG tablet, TAKE 1 TABLET BY MOUTH DAILY, Disp: 90 tablet, Rfl: 3    meloxicam (MOBIC) 15 MG tablet, Take 1 tablet by mouth daily, Disp: 30 tablet, Rfl: 3    rosuvastatin (CRESTOR) 20 MG tablet, TK 1 T PO QPM, Disp: , Rfl: 3    olmesartan-amLODIPine-HCTZ 40-5-25 MG TABS, daily , Disp: , Rfl:     cetirizine (ZYRTEC ALLERGY) 10 MG tablet, Zyrtec 10 mg tablet  Take 1 tablet every day by oral route., Disp: , Rfl:   Allergies:  Atenolol, Bisoprolol, Erythromycin, Levofloxacin, Penicillins, Sulfa antibiotics, and Verapamil  Social History:    reports that she has never smoked. She has never used smokeless tobacco. She reports current alcohol use of about 3.0 standard drinks of alcohol per week. She reports that she does not use drugs.   Family History:   Family History   Problem Relation Age of Onset    Kidney Disease Father     Heart Attack Brother     Heart Disease Brother     Alzheimer's Disease Brother     Parkinsonism Brother        REVIEW OF SYSTEMS: Full ROS reviewed & scanned into chart  CONSTITUTIONAL: Denies unexplained weight loss, fevers   SKIN: Denies skin conditions, skin cancer       PHYSICAL EXAM:    Vitals: Height 5' 4\" (1.626 m), weight 154 lb (69.9 kg). Pain score 0/10    GENERAL EXAM:  · General Apparence: Patient is adequately groomed with no evidence of malnutrition. · Orientation: The patient is oriented to time, place and person. · Mood & Affect:The patient's mood and affect are appropriate   · Lymphatic: The lymphatic examination bilaterally reveals all areas to be without enlargement or induration  · Sensation: Sensation is intact without deficit  ·   LUMBAR/SACRAL EXAMINATION:  · Inspection: Local inspection shows no step-off or bruising. Significant thoracolumbar scoliosis with rightward rib hump      · Palpation:   Mild mid thoracic tenderness to palpation. She has no focal tenderness in her lumbar spine. · Range of Motion: Able to sit forward flexed without pain  · Special Tests:   Straight leg raise and crossed SLR negative. Leg length and pelvis level.  0 out of 5 Vane's signs. · Skin: well healed herpes zoster without active infection, left thoracolumbar  · Reflexes: Reflexes are symmetrically trace to 1+ at the patellar and ankle tendons. Clonus absent bilaterally at the feet. · Gait & station: Forward flexed unassisted  · Additional Examinations:   · RIGHT LOWER EXTREMITY: Inspection/examination of the right lower extremity does not show any tenderness, deformity or injury. Range of motion is full. There is no gross instability. There are no rashes, ulcerations or lesions. Strength and tone are normal.  · LEFT LOWER EXTREMITY:  Inspection/examination of the left lower extremity does not show any tenderness, deformity or injury. Range of motion is full. There is no gross instability. There are no rashes, ulcerations or lesions.   Strength and tone are normal.    Diagnostic Testing:    Lumbar MRI scan report independently reviewed from 5/17/2021 showing scoliosis, right disc protrusion L3-4, multilevel facet arthropathy. There is no high-grade central stenosis. 2 views thoracic spine 6/7/2021 shows kyphoscoliosis with multilevel DDD/spondylosis. No definitive fracture although lateral view is very skewed due to scoliotic curve      Impression:  1) Chronic low back pain--90% improved following LESI  2) Chronic thoracic pain--underlying scoliosis, multilevel thoracolumbar spondylosis  3) H/o HTN  4) Chronic foot numbness, h/o idiopathic peripheral neuropathy      Plan:   1) She is overall much improved at this time. We discussed she may call directly if wishing to proceed with a repeat right L5-S1 LESI #2.  2) She is currently declining thoracic MRI scan but was provided our direct line if she is wishing to proceed.   3) Cont HEP      Electronically signed by Ricco Thomas PA-C, MPAS on 9/13/2021 at 3:52 PM     Ricco Thomas PA-C, 32-[address] [facility] Certified by the [facility]

## 2022-04-06 ENCOUNTER — OFFICE VISIT (OUTPATIENT)
Dept: ORTHOPEDIC SURGERY | Age: 87
End: 2022-04-06
Payer: MEDICARE

## 2022-04-06 VITALS — WEIGHT: 154 LBS | BODY MASS INDEX: 26.29 KG/M2 | HEIGHT: 64 IN

## 2022-04-06 DIAGNOSIS — M48.061 LUMBAR FORAMINAL STENOSIS: ICD-10-CM

## 2022-04-06 DIAGNOSIS — M41.9 SCOLIOSIS OF LUMBAR SPINE, UNSPECIFIED SCOLIOSIS TYPE: ICD-10-CM

## 2022-04-06 DIAGNOSIS — M51.36 DDD (DEGENERATIVE DISC DISEASE), LUMBAR: Primary | ICD-10-CM

## 2022-04-06 DIAGNOSIS — M54.16 LEFT LUMBAR RADICULITIS: ICD-10-CM

## 2022-04-06 PROCEDURE — G8427 DOCREV CUR MEDS BY ELIG CLIN: HCPCS | Performed by: PHYSICIAN ASSISTANT

## 2022-04-06 PROCEDURE — 1090F PRES/ABSN URINE INCON ASSESS: CPT | Performed by: PHYSICIAN ASSISTANT

## 2022-04-06 PROCEDURE — 1036F TOBACCO NON-USER: CPT | Performed by: PHYSICIAN ASSISTANT

## 2022-04-06 PROCEDURE — G8417 CALC BMI ABV UP PARAM F/U: HCPCS | Performed by: PHYSICIAN ASSISTANT

## 2022-04-06 PROCEDURE — 1123F ACP DISCUSS/DSCN MKR DOCD: CPT | Performed by: PHYSICIAN ASSISTANT

## 2022-04-06 PROCEDURE — 4040F PNEUMOC VAC/ADMIN/RCVD: CPT | Performed by: PHYSICIAN ASSISTANT

## 2022-04-06 PROCEDURE — 99214 OFFICE O/P EST MOD 30 MIN: CPT | Performed by: PHYSICIAN ASSISTANT

## 2022-04-06 PROCEDURE — G8400 PT W/DXA NO RESULTS DOC: HCPCS | Performed by: PHYSICIAN ASSISTANT

## 2022-04-06 RX ORDER — UBIDECARENONE 100 MG
CAPSULE ORAL
COMMUNITY

## 2022-04-06 RX ORDER — OLMESARTAN MEDOXOMIL 40 MG/1
40 TABLET ORAL DAILY
COMMUNITY

## 2022-04-06 RX ORDER — ALCLOMETASONE DIPROPIONATE 0.5 MG/G
OINTMENT TOPICAL
COMMUNITY
Start: 2022-02-28

## 2022-04-06 RX ORDER — CYCLOSPORINE 0.5 MG/ML
EMULSION OPHTHALMIC
COMMUNITY
Start: 2022-01-18

## 2022-04-06 NOTE — PROGRESS NOTES
FOLLOW UP: SPINE    4/6/2022     CHIEF COMPLAINT:    Chief Complaint   Patient presents with    Follow-up     F/U LUMBAR       HISTORY OF PRESENT ILLNESS:              The patient is a 81 y.o. female history of hypertension here for acute/chronic recurrent low back and leg pain. She has a 5-year history of atraumatic intermittent low back pain. Her pain is been increased over the last 10 days without recent trauma. She reports aching and stabbing low back pain extending into the left buttock, posterior thigh with numbness. Her pain is increased with any transition, prolonged activity, walking standing. She reports relief with sitting and resting. She has a history of L5-S1 LESI with 90% benefit in August 2021 is requesting repeat procedure. Other conservative care includes PT, MDP and meloxicam.  She currently denies any contralateral extremity radiating pain. She denies any progressive or new numbness tingling weakness. No recent bowel or bladder dysfunction or saddle anesthesia. No recent injury or trauma. No recent fevers chills or infections. She is very active taking care of her .   Current/Past Treatment:   · Physical Therapy: Yes  · Chiropractic:     · Injection:    · 8/26/2021 LUMBAR INTERLAMINAR EPIDURAL INJECTION AT   R Q0A4--48%   Medications:            NSAIDS: Meloxicam            Muscle relaxer:              Steriods:   MDP discontinued due to diarrhea            Neuropathic medications:              Opioids:            Other:   · Surgery/Consult: No    Past Medical History: Medical history form was reviewed today & scanned into the media tab  Past Medical History:   Diagnosis Date    Back pain     GERD (gastroesophageal reflux disease)     Hyperlipidemia     Hypertension     Peripheral neuropathy       Past Surgical History:     Past Surgical History:   Procedure Laterality Date    CARPAL TUNNEL RELEASE Right 10/1/2019    RIGHT OPEN CARPAL TUNNEL RELEASE performed by Chi Ramos MD Ar at [facility]. Bilateral     CHOLECYSTECTOMY      HYSTERECTOMY      PAIN MANAGEMENT PROCEDURE Right 8/26/2021    RIGHT LUMBAR FIVE SACRAL ONE EPIDURAL STEROID INJECTION SITE CONFIRMED BY FLUOROSCOPY performed by Charito Oates MD at [place] [address]     Current Medications:     Current Outpatient Medications:     alclomethasone (ACLOVATE) 0.05 % ointment, USE TWICE DAILY, Disp: , Rfl:     coenzyme Q10 100 MG CAPS capsule, Take by mouth, Disp: , Rfl:     RESTASIS 0.05 % ophthalmic emulsion, INSTILL 1 DROP IN BOTH EYES TWICE DAILY, Disp: , Rfl:     olmesartan (BENICAR) 40 MG tablet, Take 40 mg by mouth daily, Disp: , Rfl:     Cholecalciferol 400 UNIT TABS tablet, Take 400 Units by mouth daily, Disp: , Rfl:     Coenzyme Q10-Vitamin E 100-10 MG-UNIT CAPS, Take by mouth, Disp: , Rfl:     Omega-3 Fatty Acids (OMEGA-3 CF PO), Take by mouth, Disp: , Rfl:     meloxicam (MOBIC) 15 MG tablet, TAKE 1 TABLET BY MOUTH DAILY, Disp: 90 tablet, Rfl: 3    meloxicam (MOBIC) 15 MG tablet, Take 1 tablet by mouth daily, Disp: 30 tablet, Rfl: 3    rosuvastatin (CRESTOR) 20 MG tablet, TK 1 T PO QPM, Disp: , Rfl: 3    olmesartan-amLODIPine-HCTZ 40-5-25 MG TABS, daily , Disp: , Rfl:     cetirizine (ZYRTEC ALLERGY) 10 MG tablet, Zyrtec 10 mg tablet  Take 1 tablet every day by oral route., Disp: , Rfl:   Allergies:  Atenolol, Bisoprolol, Erythromycin, Levofloxacin, Penicillins, Propoxyphene, Sulfa antibiotics, and Verapamil  Social History:    reports that she has never smoked. She has never used smokeless tobacco. She reports current alcohol use of about 3.0 standard drinks of alcohol per week. She reports that she does not use drugs.   Family History:   Family History   Problem Relation Age of Onset    Kidney Disease Father     Heart Attack Brother     Heart Disease Brother     Alzheimer's Disease Brother     Parkinsonism Brother        REVIEW OF SYSTEMS: Full ROS reviewed & scanned into chart  CONSTITUTIONAL: Denies unexplained weight loss, fevers   SKIN: Denies skin conditions, skin cancer       PHYSICAL EXAM:    Vitals: Height 5' 4\" (1.626 m), weight 154 lb (69.9 kg). Pain score 8/10    GENERAL EXAM:  · General Apparence: Patient is adequately groomed with no evidence of malnutrition. · Orientation: The patient is oriented to time, place and person. · Mood & Affect:The patient's mood and affect are appropriate   · Lymphatic: The lymphatic examination bilaterally reveals all areas to be without enlargement or induration  · Sensation: Sensation is intact without deficit  ·   LUMBAR/SACRAL EXAMINATION:  · Inspection: Local inspection shows no step-off or bruising. Significant thoracolumbar scoliosis with rightward rib hump      · Palpation: Positive tenderness left of midline L5-S1 level and left sciatic notch  · Range of Motion: Moderate loss of flexion moderate to severely limited extension  · Special Tests:   Straight leg raise and crossed SLR negative. Leg length and pelvis level.  0 out of 5 Vane's signs. · Skin: No rashes or lesions on skin noted  · Reflexes: Reflexes are symmetrically trace to 1+ at the patellar and ankle tendons. Clonus absent bilaterally at the feet. · Gait & station: Forward flexed unassisted  · Additional Examinations:   · RIGHT LOWER EXTREMITY: Inspection/examination of the right lower extremity does not show any tenderness, deformity or injury. Range of motion is full. There is no gross instability. There are no rashes, ulcerations or lesions. Strength and tone are normal.  · LEFT LOWER EXTREMITY:  Inspection/examination of the left lower extremity does not show any tenderness, deformity or injury. Range of motion is full. There is no gross instability. There are no rashes, ulcerations or lesions.   Strength and tone are normal.    Diagnostic Testin views lumbar spine 2022 scoliosis, multilevel DDD/spondylosis, mild spurring and wedging L1-L2, no significant change compared to prior    Lumbar MRI scan & report again independently reviewed from 5/17/2021 showing scoliosis, right disc protrusion L3-4, moderate left foraminal stenosis L5-S1, multilevel facet arthropathy. There is no high-grade central stenosis. 2 views thoracic spine 6/7/2021 shows kyphoscoliosis with multilevel DDD/spondylosis. No definitive fracture although lateral view is very skewed due to scoliotic curve        Impression:  1) Acute/Chronic recurrent low back pain, left lumbar radiculitis  2) L5-S1 moderate left FS, scoliosis   3) Left GTB  4) Chronic thoracic pain--underlying scoliosis, multilevel thoracolumbar spondylosis  5) H/o HTN  6) Chronic foot numbness, h/o idiopathic peripheral neuropathy      Plan:   1) 2 views lumbar spine were obtained today and discussed in detail. She wishes to proceed with left L5-S1 LESI #2. Procedure risk and benefits discussed. We will order Dr. Luca Mendoza.   2) PT/HEP provided  3) Diclofenac gel, left hip  4) F/u 2wks after BONNIE       Electronically signed by Yefri Dejesus PA-C, MPAS on 4/6/2022 at 2:01 PM     Yefri Dejesus PA-C, 32-[address] [facility] Certified by the [facility]

## 2022-04-06 NOTE — LETTER
[address] [facility]    Please Schedule the following with: Traci Nick Date:  4/6/22      Patient: Ba Aocsta     YOB: 1935    Patient Home Phone: [phone] (home)    Diagnosis: Scoliosis, multilevel lumbar DDD, moderate left foraminal stenosis L5-S1, left lumbar radiculitis    [x]LT     []RT     []JACKIE     []Midline    Levels: L5-S1 #2    []Cervical BONNIE 43766, 22930  []L-MBB 90032, 84403  []SI Joint 36321   []C-FACET 41786, 64835, 30120  []L-FACET [id number], 84240  [x]Interlaminar BONNIE 14263     []HIP 44606    []C-MBB  []Transforaminal BONNIE 42489  []Neurotomy 29079, 53549, 62738    Attending Provider: Edward Chandler PA-C    Injection Schedule for: At: [facility]     First Insurance:                                               Pre-cert #:  Second Insurance:                 Pre-cert #:    Comments: Follow up with VIKY Carrasco 2weeks after procedure: [phone]    8/26/2021 LUMBAR INTERLAMINAR EPIDURAL INJECTION AT R L5-S1 - TRIP--90% relief       SEDATION:       [] IV           [] ORAL     [] Blood Thinner:                 []Diabetic           []Antibiotic:               []Glaucoma:    [] Pacemaker/defib       [] Current Open Wounds, Lacerations or Sores     Allergies:    Allergies   Allergen Reactions    Atenolol      Stomach discomfort    Bisoprolol      Stomach problems    Erythromycin Nausea Only     Pt doesn't remember    Levofloxacin Nausea Only     Can't remember    Penicillins     Propoxyphene Other (See Comments)     Pt states she was out of it    Sulfa Antibiotics Nausea Only     Pt doesn't remember      Verapamil      Can't remember       Past Medical History:   Diagnosis Date    Back pain     GERD (gastroesophageal reflux disease)     Hyperlipidemia     Hypertension     Peripheral neuropathy           Current Outpatient Medications:     alclomethasone (ACLOVATE) 0.05 % ointment, USE TWICE DAILY, Disp: , Rfl:     coenzyme Q10 100 MG CAPS capsule, Take by mouth, Disp: , Rfl:     RESTASIS 0.05 % ophthalmic emulsion, INSTILL 1 DROP IN BOTH EYES TWICE DAILY, Disp: , Rfl:     olmesartan (BENICAR) 40 MG tablet, Take 40 mg by mouth daily, Disp: , Rfl:     Cholecalciferol 400 UNIT TABS tablet, Take 400 Units by mouth daily, Disp: , Rfl:     Coenzyme Q10-Vitamin E 100-10 MG-UNIT CAPS, Take by mouth, Disp: , Rfl:     Omega-3 Fatty Acids (OMEGA-3 CF PO), Take by mouth, Disp: , Rfl:     meloxicam (MOBIC) 15 MG tablet, TAKE 1 TABLET BY MOUTH DAILY, Disp: 90 tablet, Rfl: 3    meloxicam (MOBIC) 15 MG tablet, Take 1 tablet by mouth daily, Disp: 30 tablet, Rfl: 3    rosuvastatin (CRESTOR) 20 MG tablet, TK 1 T PO QPM, Disp: , Rfl: 3    olmesartan-amLODIPine-HCTZ 40-5-25 MG TABS, daily , Disp: , Rfl:     cetirizine (ZYRTEC ALLERGY) 10 MG tablet, Zyrtec 10 mg tablet  Take 1 tablet every day by oral route., Disp: , Rfl:

## 2022-04-21 ENCOUNTER — HOSPITAL ENCOUNTER (OUTPATIENT)
Age: 87
Setting detail: OUTPATIENT SURGERY
Discharge: HOME | End: 2022-04-21
Attending: PAIN MEDICINE | Admitting: PAIN MEDICINE
Payer: MEDICARE

## 2022-04-21 VITALS
OXYGEN SATURATION: 100 % | HEART RATE: 71 BPM | HEIGHT: 64 IN | WEIGHT: 154 LBS | TEMPERATURE: 98 F | RESPIRATION RATE: 16 BRPM | SYSTOLIC BLOOD PRESSURE: 173 MMHG | BODY MASS INDEX: 26.29 KG/M2 | DIASTOLIC BLOOD PRESSURE: 81 MMHG

## 2022-04-21 PROCEDURE — 6360000002 HC RX W HCPCS: Performed by: PAIN MEDICINE

## 2022-04-21 PROCEDURE — 62323 NJX INTERLAMINAR LMBR/SAC: CPT | Performed by: PAIN MEDICINE

## 2022-04-21 PROCEDURE — 2500000003 HC RX 250 WO HCPCS: Performed by: PAIN MEDICINE

## 2022-04-21 PROCEDURE — 3600000002 HC SURGERY LEVEL 2 BASE: Performed by: PAIN MEDICINE

## 2022-04-21 PROCEDURE — 2709999900 HC NON-CHARGEABLE SUPPLY: Performed by: PAIN MEDICINE

## 2022-04-21 PROCEDURE — 6360000004 HC RX CONTRAST MEDICATION: Performed by: PAIN MEDICINE

## 2022-04-21 PROCEDURE — 7100000010 HC PHASE II RECOVERY - FIRST 15 MIN: Performed by: PAIN MEDICINE

## 2022-04-21 RX ORDER — BETAMETHASONE SODIUM PHOSPHATE AND BETAMETHASONE ACETATE 3; 3 MG/ML; MG/ML
INJECTION, SUSPENSION INTRA-ARTICULAR; INTRALESIONAL; INTRAMUSCULAR; SOFT TISSUE
Status: DISCONTINUED
Start: 2022-04-21 | End: 2022-04-21 | Stop reason: HOSPADM

## 2022-04-21 ASSESSMENT — PAIN DESCRIPTION - DESCRIPTORS: DESCRIPTORS: THROBBING;SHARP

## 2022-04-21 ASSESSMENT — PAIN - FUNCTIONAL ASSESSMENT
PAIN_FUNCTIONAL_ASSESSMENT: PREVENTS OR INTERFERES SOME ACTIVE ACTIVITIES AND ADLS
PAIN_FUNCTIONAL_ASSESSMENT: 0-10

## 2022-04-21 NOTE — PROCEDURES
Bernice Minor is a 81 y.o. female patient. No diagnosis found. Past Medical History:   Diagnosis Date    Back pain     GERD (gastroesophageal reflux disease)     Hyperlipidemia     Hypertension     Peripheral neuropathy      Blood pressure (!) 186/70, pulse 75, temperature 98 °F (36.7 °C), temperature source Temporal, resp. rate 12, height 5' 4\" (1.626 m), weight 154 lb (69.9 kg), SpO2 98 %. Procedures    Hieu Mack MD  4/21/2022  LUMBAR INTERLAMINAR EPIDURAL INJECTION AT   L L5S1        LEVEL. 41010 with 51001  INDICATIONS:  Lumbar Radiculitis 724.4, M54.16    OPERATIVE PROCEDURE:  Consent was signed by the patient after the risks and benefits were explained. With the patient in the prone position, the back was prepped with Prevail and draped. Aseptic technique was used at every stage of the procedure. Skin infiltration was with 0.5 cc of 1% Lidocaine followed by placement of an _18__-gauge Touhy needle under fluoroscopic guidance in the epidural space which was maximised by fluoroscopic angulation. Aspiration was negative for CSF or blood . This was followed by injection of 2__cc of _LIDO  with _9 mg of CELESTONE  The needle was pulled out intact. The patient tolerated the procedure well and discharge instructions were given. Appropriate dye spread was seen in both AP and Oblique views. ESTIMATED BLOOD LOSS:  Less than 1 cc      Pulse Ox Monitoring was done. Omnipaque dye was / was not injected. Lateral view was / was not checked.

## 2022-04-21 NOTE — PROGRESS NOTES
Discharge  instructions reviewed. Pt verbalize understanding with no further questions. VSS. Pt discharged ambulatory to car. Assessment unchanged.

## 2022-04-21 NOTE — PROGRESS NOTES
Patient is able to demonstrate the ability to move from a reclining position to an upright position within the recliner. Pt checked in for procedure. Pt's  in the car and number provided on the chart.

## 2022-05-02 ENCOUNTER — OFFICE VISIT (OUTPATIENT)
Dept: ORTHOPEDIC SURGERY | Age: 87
End: 2022-05-02
Payer: MEDICARE

## 2022-05-02 VITALS — WEIGHT: 154 LBS | BODY MASS INDEX: 26.29 KG/M2 | HEIGHT: 64 IN

## 2022-05-02 DIAGNOSIS — M51.36 DDD (DEGENERATIVE DISC DISEASE), LUMBAR: Primary | ICD-10-CM

## 2022-05-02 DIAGNOSIS — M48.061 LUMBAR FORAMINAL STENOSIS: ICD-10-CM

## 2022-05-02 DIAGNOSIS — M41.9 SCOLIOSIS OF LUMBAR SPINE, UNSPECIFIED SCOLIOSIS TYPE: ICD-10-CM

## 2022-05-02 DIAGNOSIS — M54.16 LEFT LUMBAR RADICULITIS: ICD-10-CM

## 2022-05-02 DIAGNOSIS — M54.50 LUMBAR SPINE PAIN: ICD-10-CM

## 2022-05-02 PROCEDURE — G8400 PT W/DXA NO RESULTS DOC: HCPCS | Performed by: PHYSICIAN ASSISTANT

## 2022-05-02 PROCEDURE — 4040F PNEUMOC VAC/ADMIN/RCVD: CPT | Performed by: PHYSICIAN ASSISTANT

## 2022-05-02 PROCEDURE — G8427 DOCREV CUR MEDS BY ELIG CLIN: HCPCS | Performed by: PHYSICIAN ASSISTANT

## 2022-05-02 PROCEDURE — 1036F TOBACCO NON-USER: CPT | Performed by: PHYSICIAN ASSISTANT

## 2022-05-02 PROCEDURE — 99214 OFFICE O/P EST MOD 30 MIN: CPT | Performed by: PHYSICIAN ASSISTANT

## 2022-05-02 PROCEDURE — 1090F PRES/ABSN URINE INCON ASSESS: CPT | Performed by: PHYSICIAN ASSISTANT

## 2022-05-02 PROCEDURE — G8417 CALC BMI ABV UP PARAM F/U: HCPCS | Performed by: PHYSICIAN ASSISTANT

## 2022-05-02 PROCEDURE — 1123F ACP DISCUSS/DSCN MKR DOCD: CPT | Performed by: PHYSICIAN ASSISTANT

## 2022-05-02 NOTE — PROGRESS NOTES
FOLLOW UP: SPINE    5/2/2022     CHIEF COMPLAINT:    Chief Complaint   Patient presents with    Follow-up     F/U AFTER EPIDURAL 4/21/2022 - TRIP       HISTORY OF PRESENT ILLNESS:              The patient is a 81 y.o. female history of hypertension here to f/u after L L5-S1 LESI from 4-21-22 for subacute/chronic recurrent low back and leg pain. She has a 5-year history of atraumatic intermittent low back pain. Her pain has been increased over the last 1 month without recent trauma. She reports aching and stabbing low back pain extending into the left buttock, posterior thigh with numbness. Her pain is increased with any transition, prolonged activity, walking standing. She reports relief with sitting and resting. She reports 30% improvement overall since the procedure. She does report some improved functionality since the procedure. Other conservative care includes brace, PT, MDP and meloxicam.  She currently denies any contralateral extremity radiating pain. She denies any progressive or new numbness tingling weakness. No recent bowel or bladder dysfunction or saddle anesthesia. No recent injury or trauma. No recent fevers chills or infections. No side effects of the procedure. She is very active taking care of her .   Current/Past Treatment:   · Physical Therapy: Yes  · Chiropractic:     · Injection:    · 8/26/2021 LUMBAR INTERLAMINAR EPIDURAL INJECTION AT   R S2Z0--53%  · 4/21/2022 LUMBAR INTERLAMINAR EPIDURAL INJECTION AT L L5-S1 - TRIP--30%   Medications:            NSAIDS: Meloxicam            Muscle relaxer:              Steriods:   MDP discontinued due to diarrhea            Neuropathic medications:              Opioids:            Other:   · Surgery/Consult: No    Past Medical History: Medical history form was reviewed today & scanned into the media tab  Past Medical History:   Diagnosis Date    Back pain     GERD (gastroesophageal reflux disease)     Hyperlipidemia     Hypertension     Peripheral neuropathy       Past Surgical History:     Past Surgical History:   Procedure Laterality Date    CARPAL TUNNEL RELEASE Right 10/1/2019    RIGHT OPEN CARPAL TUNNEL RELEASE performed by Jenna Milian MD at [facility]. Bilateral     CHOLECYSTECTOMY      HYSTERECTOMY      PAIN MANAGEMENT PROCEDURE Right 8/26/2021    RIGHT LUMBAR FIVE SACRAL ONE EPIDURAL STEROID INJECTION SITE CONFIRMED BY FLUOROSCOPY performed by Snehal Krueger MD at [address] Left 4/21/2022    LEFT LUMBAR FIVE SACRAL ONE EPIDURAL STEROID INJECTION SITE CONFIRMED BY FLUOROSCOPY performed by Snehal Krueger MD at [address] [facility] OR     Current Medications:     Current Outpatient Medications:     alclomethasone (ACLOVATE) 0.05 % ointment, USE TWICE DAILY, Disp: , Rfl:     coenzyme Q10 100 MG CAPS capsule, Take by mouth, Disp: , Rfl:     RESTASIS 0.05 % ophthalmic emulsion, INSTILL 1 DROP IN BOTH EYES TWICE DAILY, Disp: , Rfl:     olmesartan (BENICAR) 40 MG tablet, Take 40 mg by mouth daily (Patient not taking: Reported on 4/21/2022), Disp: , Rfl:     Cholecalciferol 400 UNIT TABS tablet, Take 400 Units by mouth daily, Disp: , Rfl:     Coenzyme Q10-Vitamin E 100-10 MG-UNIT CAPS, Take by mouth, Disp: , Rfl:     Omega-3 Fatty Acids (OMEGA-3 CF PO), Take by mouth, Disp: , Rfl:     meloxicam (MOBIC) 15 MG tablet, TAKE 1 TABLET BY MOUTH DAILY (Patient not taking: Reported on 4/21/2022), Disp: 90 tablet, Rfl: 3    meloxicam (MOBIC) 15 MG tablet, Take 1 tablet by mouth daily (Patient not taking: Reported on 4/21/2022), Disp: 30 tablet, Rfl: 3    rosuvastatin (CRESTOR) 20 MG tablet, TK 1 T PO QPM, Disp: , Rfl: 3    olmesartan-amLODIPine-HCTZ 40-5-25 MG TABS, daily , Disp: , Rfl:     cetirizine (ZYRTEC ALLERGY) 10 MG tablet, Zyrtec 10 mg tablet  Take 1 tablet every day by oral route., Disp: , Rfl:   Allergies:  Atenolol, Bisoprolol, Erythromycin, Levofloxacin, Penicillins, Propoxyphene, Sulfa antibiotics, and Verapamil  Social History:    reports that she has never smoked. She has never used smokeless tobacco. She reports current alcohol use of about 3.0 standard drinks of alcohol per week. She reports that she does not use drugs. Family History:   Family History   Problem Relation Age of Onset    Kidney Disease Father     Heart Attack Brother     Heart Disease Brother     Alzheimer's Disease Brother     Parkinsonism Brother        REVIEW OF SYSTEMS: Full ROS reviewed & scanned into chart  CONSTITUTIONAL: Denies unexplained weight loss, fevers   SKIN: Denies skin conditions, skin cancer       PHYSICAL EXAM:    Vitals: Height 5' 4\" (1.626 m), weight 154 lb (69.9 kg). Pain score 4/10    GENERAL EXAM:  · General Apparence: Patient is adequately groomed with no evidence of malnutrition. · Orientation: The patient is oriented to time, place and person. · Mood & Affect:The patient's mood and affect are appropriate   · Lymphatic: The lymphatic examination bilaterally reveals all areas to be without enlargement or induration  · Sensation: Sensation is intact without deficit  ·   LUMBAR/SACRAL EXAMINATION:  · Inspection: Local inspection shows no step-off or bruising. Significant thoracolumbar scoliosis with rightward rib hump      · Palpation: Positive tenderness left sciatic notch  · Range of Motion: Moderate loss of flexion moderate to severely limited extension  · Special Tests:   Straight leg raise and crossed SLR negative. Leg length and pelvis level.  0 out of 5 Vane's signs. · Skin: No rashes or lesions on skin noted  · Reflexes: Reflexes are symmetrically trace to 1+ at the patellar and ankle tendons. Clonus absent bilaterally at the feet. · Gait & station:  Forward flexed unassisted  · Additional Examinations:   · RIGHT LOWER EXTREMITY: Inspection/examination of the right lower extremity does not show any tenderness, deformity or injury. Range of motion is full. There is no gross instability. There are no rashes, ulcerations or lesions. Strength and tone are normal.  · LEFT LOWER EXTREMITY:  Inspection/examination of the left lower extremity does not show any tenderness, deformity or injury. Range of motion is full. There is no gross instability. There are no rashes, ulcerations or lesions. Strength and tone are normal.    Diagnostic Testing: Op note reviewed April 2022    2 views lumbar spine 4/6/2022 scoliosis, multilevel DDD/spondylosis, mild spurring and wedging L1-L2, no significant change compared to prior    Lumbar MRI scan & report again independently reviewed from 5/17/2021 showing scoliosis, right disc protrusion L3-4, moderate left foraminal stenosis L5-S1, multilevel facet arthropathy. There is no high-grade central stenosis. 2 views thoracic spine 6/7/2021 shows kyphoscoliosis with multilevel DDD/spondylosis. No definitive fracture although lateral view is very skewed due to scoliotic curve        Impression:  1) Subacute/Chronic recurrent low back pain, left lumbar radiculitis  2) L5-S1 moderate left FS, scoliosis   3) Left GTB  4) Chronic thoracic pain--underlying scoliosis, multilevel thoracolumbar spondylosis  5) H/o HTN  6) Chronic foot numbness, h/o idiopathic peripheral neuropathy      Plan:   1) We had a long discussion. We discussed options of an updated lumbar MRI versus proceeding with left L5 transforaminal BONNIE #2. She wishes to consider these options and will call directly.   2) Cont HEP      Electronically signed by Keshav Montejo PA-C, MPAS on 5/2/2022 at 2:38 PM     Keshav Montejo PA-C, [facility]  Board Certified by the [facility]

## 2023-05-10 ENCOUNTER — APPOINTMENT (OUTPATIENT)
Dept: GENERAL RADIOLOGY | Age: 88
End: 2023-05-10
Payer: MEDICARE

## 2023-05-10 ENCOUNTER — APPOINTMENT (OUTPATIENT)
Dept: CT IMAGING | Age: 88
End: 2023-05-10
Payer: MEDICARE

## 2023-05-10 ENCOUNTER — HOSPITAL ENCOUNTER (EMERGENCY)
Age: 88
Discharge: HOME | End: 2023-05-10
Attending: EMERGENCY MEDICINE
Payer: MEDICARE

## 2023-05-10 VITALS
OXYGEN SATURATION: 99 % | WEIGHT: 155 LBS | BODY MASS INDEX: 25.83 KG/M2 | SYSTOLIC BLOOD PRESSURE: 141 MMHG | TEMPERATURE: 97.8 F | RESPIRATION RATE: 16 BRPM | HEIGHT: 65 IN | HEART RATE: 80 BPM | DIASTOLIC BLOOD PRESSURE: 60 MMHG

## 2023-05-10 DIAGNOSIS — R05.1 ACUTE COUGH: ICD-10-CM

## 2023-05-10 DIAGNOSIS — J18.9 ATYPICAL PNEUMONIA: ICD-10-CM

## 2023-05-10 DIAGNOSIS — R79.89 ELEVATED BRAIN NATRIURETIC PEPTIDE (BNP) LEVEL: ICD-10-CM

## 2023-05-10 DIAGNOSIS — N18.9 CHRONIC KIDNEY DISEASE, UNSPECIFIED CKD STAGE: ICD-10-CM

## 2023-05-10 DIAGNOSIS — R55 SYNCOPE AND COLLAPSE: Primary | ICD-10-CM

## 2023-05-10 DIAGNOSIS — R77.8 ELEVATED TROPONIN: ICD-10-CM

## 2023-05-10 DIAGNOSIS — J45.901 REACTIVE AIRWAY DISEASE WITH ACUTE EXACERBATION, UNSPECIFIED ASTHMA SEVERITY, UNSPECIFIED WHETHER PERSISTENT: ICD-10-CM

## 2023-05-10 LAB
ALBUMIN SERPL-MCNC: 4.2 G/DL (ref 3.4–5)
ALBUMIN/GLOB SERPL: 1.4 {RATIO} (ref 1.1–2.2)
ALP SERPL-CCNC: 86 U/L (ref 40–129)
ALT SERPL-CCNC: 10 U/L (ref 10–40)
ANION GAP SERPL CALCULATED.3IONS-SCNC: 13 MMOL/L (ref 3–16)
AST SERPL-CCNC: 20 U/L (ref 15–37)
BACTERIA URNS QL MICRO: ABNORMAL /HPF
BASOPHILS # BLD: 0.1 K/UL (ref 0–0.2)
BASOPHILS NFR BLD: 0.7 %
BILIRUB SERPL-MCNC: 0.4 MG/DL (ref 0–1)
BILIRUB UR QL STRIP.AUTO: NEGATIVE
BUN SERPL-MCNC: 19 MG/DL (ref 7–20)
CALCIUM SERPL-MCNC: 9.3 MG/DL (ref 8.3–10.6)
CHLORIDE SERPL-SCNC: 94 MMOL/L (ref 99–110)
CLARITY UR: CLEAR
CO2 SERPL-SCNC: 27 MMOL/L (ref 21–32)
COLOR UR: YELLOW
CREAT SERPL-MCNC: 1.1 MG/DL (ref 0.6–1.2)
DEPRECATED RDW RBC AUTO: 13.1 % (ref 12.4–15.4)
EKG ATRIAL RATE: 86 BPM
EKG DIAGNOSIS: NORMAL
EKG P AXIS: 1 DEGREES
EKG P-R INTERVAL: 160 MS
EKG Q-T INTERVAL: 380 MS
EKG QRS DURATION: 72 MS
EKG QTC CALCULATION (BAZETT): 454 MS
EKG R AXIS: -38 DEGREES
EKG T AXIS: 23 DEGREES
EKG VENTRICULAR RATE: 86 BPM
EOSINOPHIL # BLD: 0.1 K/UL (ref 0–0.6)
EOSINOPHIL NFR BLD: 0.7 %
EPI CELLS #/AREA URNS HPF: ABNORMAL /HPF (ref 0–5)
GFR SERPLBLD CREATININE-BSD FMLA CKD-EPI: 49 ML/MIN/{1.73_M2}
GLUCOSE SERPL-MCNC: 136 MG/DL (ref 70–99)
GLUCOSE UR STRIP.AUTO-MCNC: NEGATIVE MG/DL
HCT VFR BLD AUTO: 43 % (ref 36–48)
HGB BLD-MCNC: 14.5 G/DL (ref 12–16)
HGB UR QL STRIP.AUTO: NEGATIVE
HYALINE CASTS #/AREA URNS LPF: ABNORMAL /LPF (ref 0–2)
KETONES UR STRIP.AUTO-MCNC: ABNORMAL MG/DL
LEUKOCYTE ESTERASE UR QL STRIP.AUTO: NEGATIVE
LYMPHOCYTES # BLD: 2 K/UL (ref 1–5.1)
LYMPHOCYTES NFR BLD: 23.9 %
MAGNESIUM SERPL-MCNC: 1.8 MG/DL (ref 1.8–2.4)
MCH RBC QN AUTO: 31.8 PG (ref 26–34)
MCHC RBC AUTO-ENTMCNC: 33.8 G/DL (ref 31–36)
MCV RBC AUTO: 94.2 FL (ref 80–100)
MONOCYTES # BLD: 0.8 K/UL (ref 0–1.3)
MONOCYTES NFR BLD: 9.9 %
NEUTROPHILS # BLD: 5.5 K/UL (ref 1.7–7.7)
NEUTROPHILS NFR BLD: 64.8 %
NITRITE UR QL STRIP.AUTO: NEGATIVE
NT-PROBNP SERPL-MCNC: 571 PG/ML (ref 0–449)
PH UR STRIP.AUTO: 7 [PH] (ref 5–8)
PLATELET # BLD AUTO: 205 K/UL (ref 135–450)
PMV BLD AUTO: 7.9 FL (ref 5–10.5)
POTASSIUM SERPL-SCNC: 3.7 MMOL/L (ref 3.5–5.1)
PROT SERPL-MCNC: 7.2 G/DL (ref 6.4–8.2)
PROT UR STRIP.AUTO-MCNC: 30 MG/DL
RBC # BLD AUTO: 4.56 M/UL (ref 4–5.2)
RBC #/AREA URNS HPF: ABNORMAL /HPF (ref 0–4)
SODIUM SERPL-SCNC: 134 MMOL/L (ref 136–145)
SP GR UR STRIP.AUTO: 1.02 (ref 1–1.03)
SPECIMEN STATUS: NORMAL
TROPONIN, HIGH SENSITIVITY: 16 NG/L (ref 0–14)
TROPONIN, HIGH SENSITIVITY: 17 NG/L (ref 0–14)
UA COMPLETE W REFLEX CULTURE PNL UR: ABNORMAL
UA DIPSTICK W REFLEX MICRO PNL UR: YES
URN SPEC COLLECT METH UR: ABNORMAL
UROBILINOGEN UR STRIP-ACNC: 0.2 E.U./DL
WBC # BLD AUTO: 8.5 K/UL (ref 4–11)
WBC #/AREA URNS HPF: ABNORMAL /HPF (ref 0–5)

## 2023-05-10 PROCEDURE — 6370000000 HC RX 637 (ALT 250 FOR IP): Performed by: EMERGENCY MEDICINE

## 2023-05-10 PROCEDURE — 85025 COMPLETE CBC W/AUTO DIFF WBC: CPT

## 2023-05-10 PROCEDURE — 81001 URINALYSIS AUTO W/SCOPE: CPT

## 2023-05-10 PROCEDURE — 80053 COMPREHEN METABOLIC PANEL: CPT

## 2023-05-10 PROCEDURE — 93005 ELECTROCARDIOGRAM TRACING: CPT | Performed by: EMERGENCY MEDICINE

## 2023-05-10 PROCEDURE — 99285 EMERGENCY DEPT VISIT HI MDM: CPT

## 2023-05-10 PROCEDURE — 70450 CT HEAD/BRAIN W/O DYE: CPT

## 2023-05-10 PROCEDURE — 2580000003 HC RX 258: Performed by: EMERGENCY MEDICINE

## 2023-05-10 PROCEDURE — 84484 ASSAY OF TROPONIN QUANT: CPT

## 2023-05-10 PROCEDURE — 71045 X-RAY EXAM CHEST 1 VIEW: CPT

## 2023-05-10 PROCEDURE — 96375 TX/PRO/DX INJ NEW DRUG ADDON: CPT

## 2023-05-10 PROCEDURE — 94640 AIRWAY INHALATION TREATMENT: CPT

## 2023-05-10 PROCEDURE — 83735 ASSAY OF MAGNESIUM: CPT

## 2023-05-10 PROCEDURE — 93010 ELECTROCARDIOGRAM REPORT: CPT | Performed by: INTERNAL MEDICINE

## 2023-05-10 PROCEDURE — 6360000002 HC RX W HCPCS: Performed by: EMERGENCY MEDICINE

## 2023-05-10 PROCEDURE — 83880 ASSAY OF NATRIURETIC PEPTIDE: CPT

## 2023-05-10 PROCEDURE — 96374 THER/PROPH/DIAG INJ IV PUSH: CPT

## 2023-05-10 RX ORDER — DOXYCYCLINE HYCLATE 100 MG
100 TABLET ORAL 2 TIMES DAILY
Qty: 14 TABLET | Refills: 0 | Status: SHIPPED | OUTPATIENT
Start: 2023-05-10 | End: 2023-05-10 | Stop reason: SDUPTHER

## 2023-05-10 RX ORDER — ACETAMINOPHEN 325 MG/1
650 TABLET ORAL ONCE
Status: COMPLETED | OUTPATIENT
Start: 2023-05-10 | End: 2023-05-10

## 2023-05-10 RX ORDER — DOXYCYCLINE HYCLATE 100 MG
100 TABLET ORAL ONCE
Status: COMPLETED | OUTPATIENT
Start: 2023-05-10 | End: 2023-05-10

## 2023-05-10 RX ORDER — 0.9 % SODIUM CHLORIDE 0.9 %
250 INTRAVENOUS SOLUTION INTRAVENOUS ONCE
Status: COMPLETED | OUTPATIENT
Start: 2023-05-10 | End: 2023-05-10

## 2023-05-10 RX ORDER — BENZONATATE 100 MG/1
100 CAPSULE ORAL ONCE
Status: COMPLETED | OUTPATIENT
Start: 2023-05-10 | End: 2023-05-10

## 2023-05-10 RX ORDER — ONDANSETRON 2 MG/ML
4 INJECTION INTRAMUSCULAR; INTRAVENOUS ONCE
Status: COMPLETED | OUTPATIENT
Start: 2023-05-10 | End: 2023-05-10

## 2023-05-10 RX ORDER — DOXYCYCLINE HYCLATE 100 MG
100 TABLET ORAL 2 TIMES DAILY
Qty: 14 TABLET | Refills: 0 | Status: SHIPPED | OUTPATIENT
Start: 2023-05-10 | End: 2023-05-17

## 2023-05-10 RX ORDER — PREDNISONE 10 MG/1
40 TABLET ORAL DAILY
Qty: 16 TABLET | Refills: 0 | Status: SHIPPED | OUTPATIENT
Start: 2023-05-10 | End: 2023-05-14

## 2023-05-10 RX ORDER — DEXAMETHASONE SODIUM PHOSPHATE 4 MG/ML
10 INJECTION, SOLUTION INTRA-ARTICULAR; INTRALESIONAL; INTRAMUSCULAR; INTRAVENOUS; SOFT TISSUE ONCE
Status: COMPLETED | OUTPATIENT
Start: 2023-05-10 | End: 2023-05-10

## 2023-05-10 RX ORDER — ALBUTEROL SULFATE 90 UG/1
AEROSOL, METERED RESPIRATORY (INHALATION)
Qty: 18 G | Refills: 0 | Status: SHIPPED | OUTPATIENT
Start: 2023-05-10

## 2023-05-10 RX ORDER — IPRATROPIUM BROMIDE AND ALBUTEROL SULFATE 2.5; .5 MG/3ML; MG/3ML
1 SOLUTION RESPIRATORY (INHALATION) ONCE
Status: COMPLETED | OUTPATIENT
Start: 2023-05-10 | End: 2023-05-10

## 2023-05-10 RX ORDER — FUROSEMIDE 10 MG/ML
20 INJECTION INTRAMUSCULAR; INTRAVENOUS ONCE
Status: COMPLETED | OUTPATIENT
Start: 2023-05-10 | End: 2023-05-10

## 2023-05-10 RX ADMIN — DOXYCYCLINE HYCLATE 100 MG: 100 TABLET, COATED ORAL at 13:05

## 2023-05-10 RX ADMIN — DEXAMETHASONE SODIUM PHOSPHATE 10 MG: 4 INJECTION, SOLUTION INTRAMUSCULAR; INTRAVENOUS at 12:21

## 2023-05-10 RX ADMIN — FUROSEMIDE 20 MG: 10 INJECTION, SOLUTION INTRAMUSCULAR; INTRAVENOUS at 12:21

## 2023-05-10 RX ADMIN — ONDANSETRON 4 MG: 2 INJECTION INTRAMUSCULAR; INTRAVENOUS at 08:56

## 2023-05-10 RX ADMIN — SODIUM CHLORIDE 250 ML: 9 INJECTION, SOLUTION INTRAVENOUS at 09:01

## 2023-05-10 RX ADMIN — IPRATROPIUM BROMIDE AND ALBUTEROL SULFATE 1 AMPULE: .5; 2.5 SOLUTION RESPIRATORY (INHALATION) at 11:00

## 2023-05-10 RX ADMIN — BENZONATATE 100 MG: 100 CAPSULE ORAL at 12:20

## 2023-05-10 RX ADMIN — ACETAMINOPHEN 650 MG: 325 TABLET ORAL at 08:56

## 2023-05-10 ASSESSMENT — ENCOUNTER SYMPTOMS
COUGH: 1
BACK PAIN: 1
COLOR CHANGE: 0
DIARRHEA: 0
WHEEZING: 1
NAUSEA: 1
VOMITING: 0
ABDOMINAL PAIN: 0
CHEST TIGHTNESS: 0
SHORTNESS OF BREATH: 0

## 2023-05-10 ASSESSMENT — PAIN - FUNCTIONAL ASSESSMENT
PAIN_FUNCTIONAL_ASSESSMENT: 0-10
PAIN_FUNCTIONAL_ASSESSMENT: 0-10

## 2023-05-10 ASSESSMENT — PAIN SCALES - GENERAL
PAINLEVEL_OUTOF10: 0
PAINLEVEL_OUTOF10: 0

## 2023-05-10 NOTE — ED NOTES
Writer ambulated patient ~200 feet with pulse oximeter at this time. HR   O2 Sat 92-96%  Patient reports lightheadedness during ambulation. Provider aware.       Sofia Allen RN  05/10/23 [id number]

## 2023-05-10 NOTE — ED PROVIDER NOTES
[address] [facility]  ED  EMERGENCY DEPARTMENT ENCOUNTER        Pt Name: Dimas Ryan  MRN: [id number]  Roscoegfmemo 1935  Date of evaluation: 5/10/2023  Provider: Sanjuan Landau, MD  PCP: Jackeline Dodson MD      CHIEF COMPLAINT       Chief Complaint   Patient presents with    Fall     Pt reports recently starting doxycycline for a cough and rosacea. Stats she's been coughing all night. (Negative covid test yesterday) reports she got up around 0645 to go to the restroom and felt dizzy causing her to fall. Pt hit her head and reports she had a short period of LOC. Dizziness       HISTORY OFPRESENT ILLNESS   (Location/Symptom, Timing/Onset, Context/Setting, Quality, Duration, Modifying Factors,Severity)  Note limiting factors. Dimas Ryan is a 81 y.o. female presenting today due to concern for having a cough for the last 5 days although it worsened yesterday to the point where she went to urgent care and had a COVID test which was negative but subsequently cough throughout the night to the point where she got very little sleep and then around 645 this morning tried to use the restroom at which point she started to feel very lightheaded and went down to her knees the next thing she remembers she woke up and thinks she briefly passed out. She did have an abrasion to her right forehead and initially had a headache although was denying any headache when I was speaking to her. She denies any neck pain. She does complain about upper back pain related to the cough and did notice a wheeze with her cough. She denies any history of smoking. No abdominal pain or vomiting or diarrhea although she did feel nauseated at the time of the lightheadedness. No dizziness or diaphoresis. Her temperature is gone as high as 99.3 °F but never had a true fever.   She was taking 20 mg doxycycline for rosacea from her dermatologist and started it 2 days ago and did have this increased to 100 mg but

## 2023-05-10 NOTE — DISCHARGE INSTRUCTIONS
Take albuterol due to concern for wheezing and cough. Use honey as needed as a cough suppressant. Take doxycycline due to concern for atypical pneumonia. Take prednisone due to concern for reactive airway disease. We did discuss admission but at this time you are declining. Follow-up with your primary doctor in 1 to 2 days for repeat evaluation to ensure you are doing well. You should also talk to your doctor about getting an echocardiogram.    Return to the emergency department for any return of passing out, new onset chest pain with shortness of breath, worsening cough with trouble breathing, or any other concerns.

## 2023-05-10 NOTE — ED NOTES
@12 Did orthos on pt. Put results in chart. Patient was stating light headness when pt sat up and stood up.

## 2023-05-22 ENCOUNTER — APPOINTMENT (OUTPATIENT)
Dept: GENERAL RADIOLOGY | Age: 88
DRG: 194 | End: 2023-05-22
Payer: MEDICARE

## 2023-05-22 ENCOUNTER — HOSPITAL ENCOUNTER (INPATIENT)
Age: 88
LOS: 2 days | Discharge: HOME | DRG: 194 | End: 2023-05-24
Attending: EMERGENCY MEDICINE | Admitting: STUDENT IN AN ORGANIZED HEALTH CARE EDUCATION/TRAINING PROGRAM
Payer: MEDICARE

## 2023-05-22 DIAGNOSIS — N17.9 AKI (ACUTE KIDNEY INJURY) (HCC): ICD-10-CM

## 2023-05-22 DIAGNOSIS — Z78.9 FAILURE OF OUTPATIENT TREATMENT: ICD-10-CM

## 2023-05-22 DIAGNOSIS — R55 SYNCOPE AND COLLAPSE: ICD-10-CM

## 2023-05-22 DIAGNOSIS — D72.829 LEUKOCYTOSIS, UNSPECIFIED TYPE: ICD-10-CM

## 2023-05-22 DIAGNOSIS — R77.8 ELEVATED TROPONIN: ICD-10-CM

## 2023-05-22 DIAGNOSIS — R06.02 SHORTNESS OF BREATH: Primary | ICD-10-CM

## 2023-05-22 LAB
ALBUMIN SERPL-MCNC: 4.2 G/DL (ref 3.4–5)
ALBUMIN/GLOB SERPL: 1.4 {RATIO} (ref 1.1–2.2)
ALP SERPL-CCNC: 91 U/L (ref 40–129)
ALT SERPL-CCNC: 11 U/L (ref 10–40)
ANION GAP SERPL CALCULATED.3IONS-SCNC: 12 MMOL/L (ref 3–16)
AST SERPL-CCNC: 20 U/L (ref 15–37)
BASE EXCESS BLDV CALC-SCNC: 2.9 MMOL/L (ref -3–3)
BASOPHILS # BLD: 0 K/UL (ref 0–0.2)
BASOPHILS NFR BLD: 0 %
BILIRUB SERPL-MCNC: 0.3 MG/DL (ref 0–1)
BILIRUB UR QL STRIP.AUTO: NEGATIVE
BUN SERPL-MCNC: 18 MG/DL (ref 7–20)
CALCIUM SERPL-MCNC: 10 MG/DL (ref 8.3–10.6)
CHLORIDE SERPL-SCNC: 91 MMOL/L (ref 99–110)
CLARITY UR: CLEAR
CO2 BLDV-SCNC: 28 MMOL/L
CO2 SERPL-SCNC: 27 MMOL/L (ref 21–32)
COHGB MFR BLDV: 2.5 % (ref 0–1.5)
COLOR UR: NORMAL
CREAT SERPL-MCNC: 1.1 MG/DL (ref 0.6–1.2)
D DIMER: <0.27 UG/ML FEU (ref 0–0.6)
DEPRECATED RDW RBC AUTO: 12.9 % (ref 12.4–15.4)
EKG ATRIAL RATE: 97 BPM
EKG DIAGNOSIS: NORMAL
EKG P-R INTERVAL: 154 MS
EKG Q-T INTERVAL: 362 MS
EKG QRS DURATION: 84 MS
EKG QTC CALCULATION (BAZETT): 459 MS
EKG R AXIS: -41 DEGREES
EKG T AXIS: 31 DEGREES
EKG VENTRICULAR RATE: 97 BPM
EOSINOPHIL # BLD: 0 K/UL (ref 0–0.6)
EOSINOPHIL NFR BLD: 0 %
FLUAV RNA RESP QL NAA+PROBE: NOT DETECTED
FLUBV RNA RESP QL NAA+PROBE: NOT DETECTED
GFR SERPLBLD CREATININE-BSD FMLA CKD-EPI: 49 ML/MIN/{1.73_M2}
GLUCOSE SERPL-MCNC: 122 MG/DL (ref 70–99)
GLUCOSE UR STRIP.AUTO-MCNC: NEGATIVE MG/DL
HCO3 BLDV-SCNC: 27.1 MMOL/L (ref 23–29)
HCT VFR BLD AUTO: 44.9 % (ref 36–48)
HETEROPH AB BLD QL IA: NEGATIVE
HGB BLD-MCNC: 14.9 G/DL (ref 12–16)
HGB UR QL STRIP.AUTO: NEGATIVE
INR PPP: 0.92 (ref 0.84–1.16)
KETONES UR STRIP.AUTO-MCNC: NEGATIVE MG/DL
LEUKOCYTE ESTERASE UR QL STRIP.AUTO: NEGATIVE
LYMPHOCYTES # BLD: 7.4 K/UL (ref 1–5.1)
LYMPHOCYTES NFR BLD: 25 %
MAGNESIUM SERPL-MCNC: 2 MG/DL (ref 1.8–2.4)
MCH RBC QN AUTO: 31 PG (ref 26–34)
MCHC RBC AUTO-ENTMCNC: 33.2 G/DL (ref 31–36)
MCV RBC AUTO: 93.5 FL (ref 80–100)
METHGB MFR BLDV: 0.6 %
MONOCYTES # BLD: 0.9 K/UL (ref 0–1.3)
MONOCYTES NFR BLD: 5 %
NEUTROPHILS # BLD: 9.3 K/UL (ref 1.7–7.7)
NEUTROPHILS NFR BLD: 49 %
NEUTS BAND NFR BLD MANUAL: 4 % (ref 0–7)
NITRITE UR QL STRIP.AUTO: NEGATIVE
NT-PROBNP SERPL-MCNC: 222 PG/ML (ref 0–449)
O2 THERAPY: ABNORMAL
PATH INTERP BLD-IMP: YES
PCO2 BLDV: 40.3 MMHG (ref 40–50)
PH BLDV: 7.45 [PH] (ref 7.35–7.45)
PH UR STRIP.AUTO: 7.5 [PH] (ref 5–8)
PLATELET # BLD AUTO: 322 K/UL (ref 135–450)
PMV BLD AUTO: 7.5 FL (ref 5–10.5)
PO2 BLDV: 57.1 MMHG (ref 25–40)
POTASSIUM SERPL-SCNC: 3.7 MMOL/L (ref 3.5–5.1)
PROCALCITONIN SERPL IA-MCNC: 0.05 NG/ML (ref 0–0.15)
PROT SERPL-MCNC: 7.1 G/DL (ref 6.4–8.2)
PROT UR STRIP.AUTO-MCNC: NEGATIVE MG/DL
PROTHROMBIN TIME: 12.4 SEC (ref 11.5–14.8)
RBC # BLD AUTO: 4.81 M/UL (ref 4–5.2)
RBC MORPH BLD: NORMAL
SAO2 % BLDV: 90 %
SARS-COV-2 RNA RESP QL NAA+PROBE: NOT DETECTED
SODIUM SERPL-SCNC: 130 MMOL/L (ref 136–145)
SP GR UR STRIP.AUTO: 1.01 (ref 1–1.03)
TROPONIN, HIGH SENSITIVITY: 21 NG/L (ref 0–14)
TROPONIN, HIGH SENSITIVITY: 21 NG/L (ref 0–14)
TROPONIN, HIGH SENSITIVITY: 24 NG/L (ref 0–14)
UA COMPLETE W REFLEX CULTURE PNL UR: NORMAL
UA DIPSTICK W REFLEX MICRO PNL UR: NORMAL
URN SPEC COLLECT METH UR: NORMAL
UROBILINOGEN UR STRIP-ACNC: 0.2 E.U./DL
VARIANT LYMPHS NFR BLD MANUAL: 17 % (ref 0–6)
WBC # BLD AUTO: 17.5 K/UL (ref 4–11)

## 2023-05-22 PROCEDURE — 6360000002 HC RX W HCPCS: Performed by: STUDENT IN AN ORGANIZED HEALTH CARE EDUCATION/TRAINING PROGRAM

## 2023-05-22 PROCEDURE — 84443 ASSAY THYROID STIM HORMONE: CPT

## 2023-05-22 PROCEDURE — 85379 FIBRIN DEGRADATION QUANT: CPT

## 2023-05-22 PROCEDURE — 1200000000 HC SEMI PRIVATE

## 2023-05-22 PROCEDURE — 85025 COMPLETE CBC W/AUTO DIFF WBC: CPT

## 2023-05-22 PROCEDURE — 83880 ASSAY OF NATRIURETIC PEPTIDE: CPT

## 2023-05-22 PROCEDURE — 87449 NOS EACH ORGANISM AG IA: CPT

## 2023-05-22 PROCEDURE — 85610 PROTHROMBIN TIME: CPT

## 2023-05-22 PROCEDURE — 2580000003 HC RX 258: Performed by: STUDENT IN AN ORGANIZED HEALTH CARE EDUCATION/TRAINING PROGRAM

## 2023-05-22 PROCEDURE — 87636 SARSCOV2 & INF A&B AMP PRB: CPT

## 2023-05-22 PROCEDURE — 6370000000 HC RX 637 (ALT 250 FOR IP): Performed by: STUDENT IN AN ORGANIZED HEALTH CARE EDUCATION/TRAINING PROGRAM

## 2023-05-22 PROCEDURE — 71046 X-RAY EXAM CHEST 2 VIEWS: CPT

## 2023-05-22 PROCEDURE — 84300 ASSAY OF URINE SODIUM: CPT

## 2023-05-22 PROCEDURE — 94640 AIRWAY INHALATION TREATMENT: CPT

## 2023-05-22 PROCEDURE — 81003 URINALYSIS AUTO W/O SCOPE: CPT

## 2023-05-22 PROCEDURE — 83735 ASSAY OF MAGNESIUM: CPT

## 2023-05-22 PROCEDURE — 99285 EMERGENCY DEPT VISIT HI MDM: CPT

## 2023-05-22 PROCEDURE — 6370000000 HC RX 637 (ALT 250 FOR IP): Performed by: PHYSICIAN ASSISTANT

## 2023-05-22 PROCEDURE — 93010 ELECTROCARDIOGRAM REPORT: CPT | Performed by: INTERNAL MEDICINE

## 2023-05-22 PROCEDURE — 82803 BLOOD GASES ANY COMBINATION: CPT

## 2023-05-22 PROCEDURE — 84484 ASSAY OF TROPONIN QUANT: CPT

## 2023-05-22 PROCEDURE — 93005 ELECTROCARDIOGRAM TRACING: CPT | Performed by: PHYSICIAN ASSISTANT

## 2023-05-22 PROCEDURE — 36415 COLL VENOUS BLD VENIPUNCTURE: CPT

## 2023-05-22 PROCEDURE — 86308 HETEROPHILE ANTIBODY SCREEN: CPT

## 2023-05-22 PROCEDURE — 82570 ASSAY OF URINE CREATININE: CPT

## 2023-05-22 PROCEDURE — 84145 PROCALCITONIN (PCT): CPT

## 2023-05-22 PROCEDURE — 80053 COMPREHEN METABOLIC PANEL: CPT

## 2023-05-22 RX ORDER — GUAIFENESIN 600 MG/1
600 TABLET, EXTENDED RELEASE ORAL 2 TIMES DAILY
Status: DISCONTINUED | OUTPATIENT
Start: 2023-05-22 | End: 2023-05-24 | Stop reason: HOSPADM

## 2023-05-22 RX ORDER — ROSUVASTATIN CALCIUM 10 MG/1
20 TABLET, COATED ORAL NIGHTLY
Status: DISCONTINUED | OUTPATIENT
Start: 2023-05-22 | End: 2023-05-24 | Stop reason: HOSPADM

## 2023-05-22 RX ORDER — AMLODIPINE BESYLATE 5 MG/1
5 TABLET ORAL DAILY
Status: DISCONTINUED | OUTPATIENT
Start: 2023-05-22 | End: 2023-05-24 | Stop reason: HOSPADM

## 2023-05-22 RX ORDER — SODIUM CHLORIDE 0.9 % (FLUSH) 0.9 %
5-40 SYRINGE (ML) INJECTION PRN
Status: DISCONTINUED | OUTPATIENT
Start: 2023-05-22 | End: 2023-05-24 | Stop reason: HOSPADM

## 2023-05-22 RX ORDER — GUAIFENESIN/DEXTROMETHORPHAN 100-10MG/5
5 SYRUP ORAL EVERY 4 HOURS PRN
Status: DISCONTINUED | OUTPATIENT
Start: 2023-05-22 | End: 2023-05-24 | Stop reason: HOSPADM

## 2023-05-22 RX ORDER — LOSARTAN POTASSIUM 25 MG/1
100 TABLET ORAL DAILY
Status: DISCONTINUED | OUTPATIENT
Start: 2023-05-22 | End: 2023-05-24 | Stop reason: HOSPADM

## 2023-05-22 RX ORDER — PANTOPRAZOLE SODIUM 40 MG/1
40 TABLET, DELAYED RELEASE ORAL
Status: DISCONTINUED | OUTPATIENT
Start: 2023-05-23 | End: 2023-05-24 | Stop reason: HOSPADM

## 2023-05-22 RX ORDER — IPRATROPIUM BROMIDE AND ALBUTEROL SULFATE 2.5; .5 MG/3ML; MG/3ML
1 SOLUTION RESPIRATORY (INHALATION)
Status: DISCONTINUED | OUTPATIENT
Start: 2023-05-22 | End: 2023-05-23

## 2023-05-22 RX ORDER — SODIUM CHLORIDE 9 MG/ML
INJECTION, SOLUTION INTRAVENOUS PRN
Status: DISCONTINUED | OUTPATIENT
Start: 2023-05-22 | End: 2023-05-24 | Stop reason: HOSPADM

## 2023-05-22 RX ORDER — ONDANSETRON 4 MG/1
4 TABLET, ORALLY DISINTEGRATING ORAL EVERY 8 HOURS PRN
Status: DISCONTINUED | OUTPATIENT
Start: 2023-05-22 | End: 2023-05-24 | Stop reason: HOSPADM

## 2023-05-22 RX ORDER — ACETAMINOPHEN 650 MG/1
650 SUPPOSITORY RECTAL EVERY 6 HOURS PRN
Status: DISCONTINUED | OUTPATIENT
Start: 2023-05-22 | End: 2023-05-24 | Stop reason: HOSPADM

## 2023-05-22 RX ORDER — CETIRIZINE HYDROCHLORIDE 10 MG/1
10 TABLET ORAL ONCE
Status: COMPLETED | OUTPATIENT
Start: 2023-05-22 | End: 2023-05-22

## 2023-05-22 RX ORDER — SODIUM CHLORIDE 0.9 % (FLUSH) 0.9 %
5-40 SYRINGE (ML) INJECTION EVERY 12 HOURS SCHEDULED
Status: DISCONTINUED | OUTPATIENT
Start: 2023-05-22 | End: 2023-05-24 | Stop reason: HOSPADM

## 2023-05-22 RX ORDER — LACTOBACILLUS RHAMNOSUS GG 10B CELL
1 CAPSULE ORAL
Status: DISCONTINUED | OUTPATIENT
Start: 2023-05-23 | End: 2023-05-24 | Stop reason: HOSPADM

## 2023-05-22 RX ORDER — FUROSEMIDE 10 MG/ML
20 INJECTION INTRAMUSCULAR; INTRAVENOUS ONCE
Status: COMPLETED | OUTPATIENT
Start: 2023-05-22 | End: 2023-05-22

## 2023-05-22 RX ORDER — FLUTICASONE PROPIONATE 50 MCG
2 SPRAY, SUSPENSION (ML) NASAL DAILY
Status: DISCONTINUED | OUTPATIENT
Start: 2023-05-22 | End: 2023-05-24 | Stop reason: HOSPADM

## 2023-05-22 RX ORDER — ONDANSETRON 2 MG/ML
4 INJECTION INTRAMUSCULAR; INTRAVENOUS EVERY 6 HOURS PRN
Status: DISCONTINUED | OUTPATIENT
Start: 2023-05-22 | End: 2023-05-24 | Stop reason: HOSPADM

## 2023-05-22 RX ORDER — POLYETHYLENE GLYCOL 3350 17 G/17G
17 POWDER, FOR SOLUTION ORAL DAILY PRN
Status: DISCONTINUED | OUTPATIENT
Start: 2023-05-22 | End: 2023-05-24 | Stop reason: HOSPADM

## 2023-05-22 RX ORDER — ENOXAPARIN SODIUM 100 MG/ML
40 INJECTION SUBCUTANEOUS DAILY
Status: DISCONTINUED | OUTPATIENT
Start: 2023-05-22 | End: 2023-05-23

## 2023-05-22 RX ORDER — ACETAMINOPHEN 325 MG/1
650 TABLET ORAL EVERY 6 HOURS PRN
Status: DISCONTINUED | OUTPATIENT
Start: 2023-05-22 | End: 2023-05-24 | Stop reason: HOSPADM

## 2023-05-22 RX ADMIN — FLUTICASONE PROPIONATE 2 SPRAY: 50 SPRAY, METERED NASAL at 21:40

## 2023-05-22 RX ADMIN — FUROSEMIDE 20 MG: 10 INJECTION, SOLUTION INTRAMUSCULAR; INTRAVENOUS at 22:05

## 2023-05-22 RX ADMIN — SODIUM CHLORIDE, PRESERVATIVE FREE 10 ML: 5 INJECTION INTRAVENOUS at 21:40

## 2023-05-22 RX ADMIN — CETIRIZINE HYDROCHLORIDE 10 MG: 10 TABLET, FILM COATED ORAL at 15:17

## 2023-05-22 RX ADMIN — IPRATROPIUM BROMIDE AND ALBUTEROL SULFATE 1 AMPULE: 2.5; .5 SOLUTION RESPIRATORY (INHALATION) at 19:32

## 2023-05-22 RX ADMIN — ROSUVASTATIN 20 MG: 10 TABLET, FILM COATED ORAL at 21:40

## 2023-05-22 RX ADMIN — GUAIFENESIN 600 MG: 600 TABLET ORAL at 21:40

## 2023-05-22 RX ADMIN — ENOXAPARIN SODIUM 40 MG: 100 INJECTION SUBCUTANEOUS at 21:40

## 2023-05-22 RX ADMIN — CEFTRIAXONE SODIUM 1000 MG: 1 INJECTION, POWDER, FOR SOLUTION INTRAMUSCULAR; INTRAVENOUS at 17:48

## 2023-05-22 RX ADMIN — ACETAMINOPHEN 650 MG: 325 TABLET ORAL at 17:48

## 2023-05-22 RX ADMIN — GUAIFENESIN AND DEXTROMETHORPHAN 5 ML: 100; 10 SYRUP ORAL at 17:47

## 2023-05-22 RX ADMIN — ONDANSETRON 4 MG: 2 INJECTION INTRAMUSCULAR; INTRAVENOUS at 17:46

## 2023-05-22 RX ADMIN — Medication 2 PUFF: at 20:32

## 2023-05-22 ASSESSMENT — PAIN DESCRIPTION - ORIENTATION: ORIENTATION: LOWER;MID

## 2023-05-22 ASSESSMENT — PAIN - FUNCTIONAL ASSESSMENT
PAIN_FUNCTIONAL_ASSESSMENT: 0-10
PAIN_FUNCTIONAL_ASSESSMENT: ACTIVITIES ARE NOT PREVENTED

## 2023-05-22 ASSESSMENT — PAIN SCALES - GENERAL
PAINLEVEL_OUTOF10: 0
PAINLEVEL_OUTOF10: 0
PAINLEVEL_OUTOF10: 7
PAINLEVEL_OUTOF10: 4

## 2023-05-22 ASSESSMENT — PAIN DESCRIPTION - ONSET: ONSET: ON-GOING

## 2023-05-22 ASSESSMENT — PAIN DESCRIPTION - LOCATION: LOCATION: BACK

## 2023-05-22 ASSESSMENT — PAIN DESCRIPTION - DESCRIPTORS: DESCRIPTORS: ACHING;DISCOMFORT

## 2023-05-22 ASSESSMENT — PAIN DESCRIPTION - FREQUENCY: FREQUENCY: INTERMITTENT

## 2023-05-22 ASSESSMENT — PAIN DESCRIPTION - PAIN TYPE: TYPE: ACUTE PAIN

## 2023-05-22 NOTE — ED NOTES
Pt here today with SOB pt state that she has had chest congestion and cough with SOB for 2 weeks. Pt also with a lot of nasal drainage. Pt state that 2 weeks ago she had a syncope episode with a coughing spell and came to the ER and was put on abx and steroids for atypical pneumonia and asthma. Pt is awake alert orient x4. Monitors  in place. Family at bedside.      Toribio Krause RN  05/22/23 [id number]

## 2023-05-22 NOTE — ED PROVIDER NOTES
[address] [facility]  ED  EMERGENCY DEPARTMENT ENCOUNTER        Pt Name: Jayshree Berg  MRN: [id number]  Armsbrennagfmemo 1935  Date of evaluation: 5/22/2023  Provider: Néstor Chen PA-C  PCP: Marlene Cano MD  Note Started: 10:43 AM EDT 5/22/23       I have seen and evaluated this patient with my supervising physician Jamila Peter MD.      [facility]       Chief Complaint   Patient presents with    Shortness of Breath     Started 2 weeks ago, arrived on 4L per NC by ems, duoneb given by medics       HISTORY OF PRESENT ILLNESS: 1 or more Elements     History From: Patient    Limitations to history : None    Jayshree Berg is a 81 y.o. female who presents to the emergency department with complaint of chest congestion and cough. Patient reports onset 2 weeks ago. She reports symptoms continue to persist.  States she has chest congestion, postnasal drainage. She indicates shortness of breath. She has nasal cannula in place which was applied by EMS and while I am in the room she is off oxygen saturating 97% to 100%. She was seen this facility 2 weeks ago. At that point she had cough and experienced a syncopal collapse event. She had thorough work-up. X-ray was negative however she did have diagnosis of cough, atypical pneumonia and treated with doxycycline. Patient diagnosed asthma reactive airway and treated with prednisone and Tessalon. Patient with CKD, elevated troponin and elevated BNP. In the ED visit the patient's initial troponin was 16 and repeat troponin 17. The patient's BNP was 571. This is above value but age-appropriate as she is 81years of age. During the 5/10/2023 ED visit the patient was given DuoNeb x1, Decadron 10 mg IV x1, Lasix 20 mg. Subsequently patient was seen by PCP and treated with Z-Pipe.   She was also referred to urgent care and had a negative COVID test.  Patient is seeing her cardiologist at [facility] who indicated that her

## 2023-05-22 NOTE — ED PROVIDER NOTES
I independently performed a history and physical on Kennedy Peña. All diagnostic, treatment, and disposition decisions were made by myself in conjunction with the advanced practice provider. I personally saw the patient and performed a substantive portion of the visit including all aspects of the medical decision making. For [facility]  [facility], please see RULA Cullen's documentation. Patient is an 81-year-old female presenting today with worsening shortness of breath over the past couple weeks along with sinus congestion and drainage. I did see her in the emergency department on May 10, 2023 and did offer admission at that point due to concern for syncope along with a multitude of other issues but at that time she was feeling better and wanted to go home. She reported trying additional treatments as an outpatient but has had worsening issues with cough and congestion ever since. She denies any chest pain. No significant headache. No falls or trauma. No new syncopal events since being seen 2 weeks ago. Due to concern for worsening shortness of breath, she came to the emergency department for further evaluation. Physical:   Gen: No acute distress. AOx3. Psych: Anxious mood and affect  HEENT: NCAT  Neck: supple  Cardiac: Regular rate and rhythm  Lungs: Normal effort  Abdomen: soft and nontender with no R/D/G  Neuro: GCS equals 15      The Ekg interpreted by me shows  Sinus rhythm with occasional PVCs noted  with a rate of 97  Axis is   Left axis deviation  QTc is  within an acceptable range  Intervals and Durations are unremarkable. ST Segments: no acute change and nonspecific changes  No significant change from prior EKG dated - 5/10/23  No STEMI       I was the primary provider for the patient along with RULA Gan.     MDM: Patient is an 81-year-old female who was seen 2 weeks ago by me due to concern for syncope with concern for

## 2023-05-22 NOTE — H&P
V2.0  History and Physical      Name:  Ryan Ortiz /Age/Sex: 1935  (81 y.o. female)   MRN & CSN:  [id number] & [id number] Encounter Date/Time: 2023 2:48 PM EDT   Location:   PCP: Brigido Ram MD       Hospital Day: 1      History of Present Illness:     Chief Complaint: SOB    Ryan Ortiz is a 81 y.o. female with PMH of HTN, HLD who presents with SOB. Pt has been complaining of chest congestion, cough and SOB started 2 weeks ago. Pt also endorses postnasal drainage. Pt complains of SOB worse on exertion. Pt also had a syncope episode after having a cough spill 2 weeks ago. She presented to ED [facility] where she was prescribed doxycycline and prednisone for atypical pneumonia and asthma. She also received breathing treatment and lasix 20 mg IV during that ED visit. Pt continued to have worsening respirotary symptoms with no improvement or worsening. She saw her PCP who started her on albuterol, Symbicort, and azithromycin. Pt also had CT chest 2023 which showed moderate to large hiatal hernia, accentuated thoracic kyphosis but no pleural effusion or consolidation. Pt decided to come to ER today given her persistent SOB and cough symptoms. Pt denies orthopnea or PND. Son was sick a week ago but now back to baseline. No travel history. Pt never smokes tobacco. No hx of asthma or COPD. Pt also endorses occasional lightheadedness and headache past 2 weeks. She also reports nausea started after taking the meds she was prescribed recently. Pt had chills this morning but never had fever. Pt also complains of diarrhea past 2 days 2-3 watery/loose BM. At ED, pt was afebrile, hemodynamically stable; on room air sating 97%. Labs remarkable for Na 130, Cl 91, glucose 122, troponin 21, proBNP 222, WBC 17.5, Hgb 14.9. UA negative. CXR unremarkable.  ECG showed LVH, occasional PVC otherwise normal. Discussed with ED, will admit to med-surg for workup given repeated healthcare

## 2023-05-23 LAB
ANION GAP SERPL CALCULATED.3IONS-SCNC: 12 MMOL/L (ref 3–16)
BASOPHILS # BLD: 0 K/UL (ref 0–0.2)
BASOPHILS NFR BLD: 0 %
BUN SERPL-MCNC: 22 MG/DL (ref 7–20)
CALCIUM SERPL-MCNC: 9.3 MG/DL (ref 8.3–10.6)
CHLORIDE SERPL-SCNC: 97 MMOL/L (ref 99–110)
CO2 SERPL-SCNC: 26 MMOL/L (ref 21–32)
CREAT SERPL-MCNC: 1.4 MG/DL (ref 0.6–1.2)
CREAT UR-MCNC: 138.3 MG/DL (ref 28–259)
DEPRECATED RDW RBC AUTO: 13 % (ref 12.4–15.4)
EOSINOPHIL # BLD: 0.3 K/UL (ref 0–0.6)
EOSINOPHIL NFR BLD: 2 %
GFR SERPLBLD CREATININE-BSD FMLA CKD-EPI: 36 ML/MIN/{1.73_M2}
GLUCOSE SERPL-MCNC: 97 MG/DL (ref 70–99)
HCT VFR BLD AUTO: 40.3 % (ref 36–48)
HGB BLD-MCNC: 13.2 G/DL (ref 12–16)
LEGIONELLA AG UR QL: NORMAL
LV EF: 68 %
LVEF MODALITY: NORMAL
LYMPHOCYTES # BLD: 8.2 K/UL (ref 1–5.1)
LYMPHOCYTES NFR BLD: 35 %
MCH RBC QN AUTO: 30.9 PG (ref 26–34)
MCHC RBC AUTO-ENTMCNC: 32.7 G/DL (ref 31–36)
MCV RBC AUTO: 94.4 FL (ref 80–100)
MONOCYTES # BLD: 0.5 K/UL (ref 0–1.3)
MONOCYTES NFR BLD: 3 %
NEUTROPHILS # BLD: 8 K/UL (ref 1.7–7.7)
NEUTROPHILS NFR BLD: 47 %
PATH INTERP BLD-IMP: NO
PATH INTERP BLD-IMP: NORMAL
PLATELET # BLD AUTO: 294 K/UL (ref 135–450)
PLATELET BLD QL SMEAR: ADEQUATE
PMV BLD AUTO: 7.5 FL (ref 5–10.5)
POTASSIUM SERPL-SCNC: 3.8 MMOL/L (ref 3.5–5.1)
RBC # BLD AUTO: 4.26 M/UL (ref 4–5.2)
RBC MORPH BLD: NORMAL
S PNEUM AG UR QL: NORMAL
SLIDE REVIEW: ABNORMAL
SODIUM SERPL-SCNC: 135 MMOL/L (ref 136–145)
SODIUM UR-SCNC: 87 MMOL/L
TROPONIN, HIGH SENSITIVITY: 31 NG/L (ref 0–14)
TSH SERPL DL<=0.005 MIU/L-ACNC: 1.11 UIU/ML (ref 0.27–4.2)
TSH SERPL DL<=0.005 MIU/L-ACNC: 1.72 UIU/ML (ref 0.27–4.2)
VARIANT LYMPHS NFR BLD MANUAL: 13 % (ref 0–6)
WBC # BLD AUTO: 17.1 K/UL (ref 4–11)

## 2023-05-23 PROCEDURE — 36415 COLL VENOUS BLD VENIPUNCTURE: CPT

## 2023-05-23 PROCEDURE — 94669 MECHANICAL CHEST WALL OSCILL: CPT

## 2023-05-23 PROCEDURE — 85025 COMPLETE CBC W/AUTO DIFF WBC: CPT

## 2023-05-23 PROCEDURE — 93306 TTE W/DOPPLER COMPLETE: CPT

## 2023-05-23 PROCEDURE — 2580000003 HC RX 258: Performed by: STUDENT IN AN ORGANIZED HEALTH CARE EDUCATION/TRAINING PROGRAM

## 2023-05-23 PROCEDURE — 1200000000 HC SEMI PRIVATE

## 2023-05-23 PROCEDURE — 80048 BASIC METABOLIC PNL TOTAL CA: CPT

## 2023-05-23 PROCEDURE — 0202U NFCT DS 22 TRGT SARS-COV-2: CPT

## 2023-05-23 PROCEDURE — 84484 ASSAY OF TROPONIN QUANT: CPT

## 2023-05-23 PROCEDURE — 6370000000 HC RX 637 (ALT 250 FOR IP): Performed by: STUDENT IN AN ORGANIZED HEALTH CARE EDUCATION/TRAINING PROGRAM

## 2023-05-23 PROCEDURE — 6360000002 HC RX W HCPCS: Performed by: STUDENT IN AN ORGANIZED HEALTH CARE EDUCATION/TRAINING PROGRAM

## 2023-05-23 PROCEDURE — 94640 AIRWAY INHALATION TREATMENT: CPT

## 2023-05-23 PROCEDURE — 6370000000 HC RX 637 (ALT 250 FOR IP)

## 2023-05-23 RX ORDER — IPRATROPIUM BROMIDE AND ALBUTEROL SULFATE 2.5; .5 MG/3ML; MG/3ML
1 SOLUTION RESPIRATORY (INHALATION) EVERY 4 HOURS PRN
Status: DISCONTINUED | OUTPATIENT
Start: 2023-05-23 | End: 2023-05-24 | Stop reason: HOSPADM

## 2023-05-23 RX ORDER — ENOXAPARIN SODIUM 100 MG/ML
30 INJECTION SUBCUTANEOUS DAILY
Status: DISCONTINUED | OUTPATIENT
Start: 2023-05-24 | End: 2023-05-24 | Stop reason: HOSPADM

## 2023-05-23 RX ORDER — ATROPA BELLADONNA, EUPHRASIA STRICTA AND MERCURIC CHLORIDE 6; 6; 6 [HP_X]/10ML; [HP_X]/10ML; [HP_X]/10ML
1 SOLUTION/ DROPS OPHTHALMIC NIGHTLY
COMMUNITY

## 2023-05-23 RX ORDER — APIS MELLIFERA, EUPHRASIA STRICTA AND SCHOENOCAULON OFFICINALE SEED 6; 6; 6 [HP_X]/.45ML; [HP_X]/.45ML; [HP_X]/.45ML
1 SOLUTION/ DROPS OPHTHALMIC NIGHTLY
Status: DISCONTINUED | OUTPATIENT
Start: 2023-05-23 | End: 2023-05-24 | Stop reason: HOSPADM

## 2023-05-23 RX ORDER — ALBUTEROL SULFATE 90 UG/1
2 AEROSOL, METERED RESPIRATORY (INHALATION) 2 TIMES DAILY
Status: DISCONTINUED | OUTPATIENT
Start: 2023-05-23 | End: 2023-05-24 | Stop reason: HOSPADM

## 2023-05-23 RX ADMIN — ACETAMINOPHEN 650 MG: 325 TABLET ORAL at 04:57

## 2023-05-23 RX ADMIN — Medication 1 CAPSULE: at 08:25

## 2023-05-23 RX ADMIN — CEFTRIAXONE SODIUM 1000 MG: 1 INJECTION, POWDER, FOR SOLUTION INTRAMUSCULAR; INTRAVENOUS at 16:19

## 2023-05-23 RX ADMIN — IPRATROPIUM BROMIDE AND ALBUTEROL SULFATE 1 AMPULE: 2.5; .5 SOLUTION RESPIRATORY (INHALATION) at 07:34

## 2023-05-23 RX ADMIN — AMLODIPINE BESYLATE 5 MG: 5 TABLET ORAL at 08:25

## 2023-05-23 RX ADMIN — SODIUM CHLORIDE, PRESERVATIVE FREE 10 ML: 5 INJECTION INTRAVENOUS at 20:12

## 2023-05-23 RX ADMIN — SODIUM CHLORIDE, PRESERVATIVE FREE 10 ML: 5 INJECTION INTRAVENOUS at 08:25

## 2023-05-23 RX ADMIN — Medication 2 PUFF: at 07:34

## 2023-05-23 RX ADMIN — PANTOPRAZOLE SODIUM 40 MG: 40 TABLET, DELAYED RELEASE ORAL at 05:01

## 2023-05-23 RX ADMIN — FLUTICASONE PROPIONATE 2 SPRAY: 50 SPRAY, METERED NASAL at 08:25

## 2023-05-23 RX ADMIN — IPRATROPIUM BROMIDE AND ALBUTEROL SULFATE 1 AMPULE: 2.5; .5 SOLUTION RESPIRATORY (INHALATION) at 12:00

## 2023-05-23 RX ADMIN — LOSARTAN POTASSIUM 100 MG: 25 TABLET, FILM COATED ORAL at 08:25

## 2023-05-23 RX ADMIN — Medication 2 PUFF: at 19:51

## 2023-05-23 RX ADMIN — GUAIFENESIN 600 MG: 600 TABLET ORAL at 20:12

## 2023-05-23 RX ADMIN — GUAIFENESIN 600 MG: 600 TABLET ORAL at 08:25

## 2023-05-23 RX ADMIN — ROSUVASTATIN 20 MG: 10 TABLET, FILM COATED ORAL at 20:11

## 2023-05-23 RX ADMIN — APIS MELLIFERA, EUPHRASIA STRICTA AND SCHOENOCAULON OFFICINALE SEED 1 DROP: 6; 6; 6 SOLUTION/ DROPS OPHTHALMIC at 20:11

## 2023-05-23 RX ADMIN — ENOXAPARIN SODIUM 40 MG: 100 INJECTION SUBCUTANEOUS at 08:25

## 2023-05-23 ASSESSMENT — PAIN DESCRIPTION - LOCATION: LOCATION: BACK

## 2023-05-23 ASSESSMENT — PAIN SCALES - GENERAL
PAINLEVEL_OUTOF10: 4
PAINLEVEL_OUTOF10: 7

## 2023-05-23 ASSESSMENT — PAIN - FUNCTIONAL ASSESSMENT: PAIN_FUNCTIONAL_ASSESSMENT: ACTIVITIES ARE NOT PREVENTED

## 2023-05-23 ASSESSMENT — PAIN DESCRIPTION - ONSET: ONSET: ON-GOING

## 2023-05-23 ASSESSMENT — PAIN DESCRIPTION - PAIN TYPE: TYPE: ACUTE PAIN

## 2023-05-23 ASSESSMENT — PAIN DESCRIPTION - ORIENTATION: ORIENTATION: MID;LOWER

## 2023-05-23 ASSESSMENT — PAIN DESCRIPTION - FREQUENCY: FREQUENCY: INTERMITTENT

## 2023-05-23 ASSESSMENT — PAIN DESCRIPTION - DESCRIPTORS: DESCRIPTORS: ACHING

## 2023-05-23 NOTE — PLAN OF CARE
Problem: Discharge Planning  Goal: Discharge to home or other facility with appropriate resources  5/23/2023 0838 by Kimani Saavedra RN  Outcome: Progressing  5/23/2023 0354 by Blake Elliott RN  Outcome: Progressing     Problem: Pain  Goal: Verbalizes/displays adequate comfort level or baseline comfort level  5/23/2023 0838 by Kimani Saavedra RN  Outcome: Progressing  5/23/2023 0354 by Blake Elliott RN  Outcome: Progressing     Problem: Safety - Adult  Goal: Free from fall injury  5/23/2023 0838 by Kimani Saavedra RN  Outcome: Progressing  5/23/2023 0354 by Blake Elloitt RN  Outcome: Progressing     Problem: ABCDS Injury Assessment  Goal: Absence of physical injury  5/23/2023 0838 by Kimani Saavedra RN  Outcome: Progressing  5/23/2023 0354 by Blake Elliott RN  Outcome: Progressing     Problem: Respiratory - Adult  Goal: Achieves optimal ventilation and oxygenation  5/23/2023 0838 by Kimani Saavedra RN  Outcome: Progressing  5/23/2023 0354 by Blake Elliott RN  Outcome: Progressing     Problem: Cardiovascular - Adult  Goal: Maintains optimal cardiac output and hemodynamic stability  5/23/2023 0838 by Kimani Saavedra RN  Outcome: Progressing  5/23/2023 0354 by Blake Elliott RN  Outcome: Progressing  Goal: Absence of cardiac dysrhythmias or at baseline  5/23/2023 0838 by Kimani Savaedra RN  Outcome: Progressing  5/23/2023 0354 by Blake Elliott RN  Outcome: Progressing     Problem: Musculoskeletal - Adult  Goal: Return mobility to safest level of function  5/23/2023 0838 by Kimani Saavedra RN  Outcome: Progressing  5/23/2023 0354 by Blake Elliott RN  Outcome: Progressing  Flowsheets (Taken 5/22/2023 2111)  Return Mobility to Safest Level of Function:   Assess patient stability and activity tolerance for standing, transferring and ambulating with or without assistive devices   Ensure adequate protection for wounds/incisions during mobilization     Problem: Infection - Adult  Goal: Absence of infection at discharge  5/23/2023 0838 by Emi Lazar

## 2023-05-23 NOTE — PLAN OF CARE
Problem: Discharge Planning  Goal: Discharge to home or other facility with appropriate resources  Outcome: Progressing     Problem: Pain  Goal: Verbalizes/displays adequate comfort level or baseline comfort level  Outcome: Progressing     Problem: Safety - Adult  Goal: Free from fall injury  Outcome: Progressing     Problem: Respiratory - Adult  Goal: Achieves optimal ventilation and oxygenation  Outcome: Progressing     Problem: Infection - Adult  Goal: Absence of infection at discharge  Outcome: Progressing  Flowsheets (Taken 5/22/2023 2111)  Absence of infection at discharge:   Assess and monitor for signs and symptoms of infection   Monitor lab/diagnostic results   Monitor all insertion sites i.e., indwelling lines, tubes and drains     Problem: Musculoskeletal - Adult  Goal: Return mobility to safest level of function  Outcome: Progressing  Flowsheets (Taken 5/22/2023 2111)  Return Mobility to Safest Level of Function:   Assess patient stability and activity tolerance for standing, transferring and ambulating with or without assistive devices   Ensure adequate protection for wounds/incisions during mobilization     Problem: Cardiovascular - Adult  Goal: Absence of cardiac dysrhythmias or at baseline  Outcome: Progressing     Problem: Cardiovascular - Adult  Goal: Maintains optimal cardiac output and hemodynamic stability  Outcome: Progressing     Problem: Infection - Adult  Goal: Absence of infection during hospitalization  Outcome: Progressing  Flowsheets (Taken 5/22/2023 2111)  Absence of infection during hospitalization:   Assess and monitor for signs and symptoms of infection   Monitor lab/diagnostic results   Monitor all insertion sites i.e., indwelling lines, tubes and drains     Problem: Infection - Adult  Goal: Absence of infection at discharge  Outcome: Progressing  Flowsheets (Taken 5/22/2023 2111)  Absence of infection at discharge:   Assess and monitor for signs and symptoms of infection

## 2023-05-23 NOTE — CARE COORDINATION
Case Management Assessment  Initial Evaluation    Date/Time of Evaluation: 5/23/2023 5:23 PM  Assessment Completed by: TOREY Rahman    If patient is discharged prior to next notation, then this note serves as note for discharge by case management. Patient Name: Sahara Whitmore                   YOB: 1935  Diagnosis: Syncope and collapse [R55]  Shortness of breath [R06.02]  Elevated troponin [R77.8]  FELICIA (acute kidney injury) ([address] [N17.9]  Failure of outpatient treatment [Z78.9]  Leukocytosis, unspecified type [D72.829]                   Date / Time: 5/22/2023  9:45 AM    Patient Admission Status: Inpatient   Readmission Risk (Low < 19, Mod (19-27), High > 27): Readmission Risk Score: 10.4    Current PCP: Gloria Miller MD  PCP verified by CM? Yes    Chart Reviewed: Yes      History Provided by: Patient  Patient Orientation: Alert and Oriented, Person, Place, Situation, Self    Patient Cognition: Alert    Hospitalization in the last 30 days (Readmission):  No      Advance Directives:      Code Status: Full Code   Patient's Primary Decision Maker is: Legal Next of Kin      Discharge Planning:    Patient lives with: Alone Type of Home: House  Primary Care Giver: Self  Patient Support Systems include: Children, Family Members   Current Financial resources: Medicare  Current community resources: None  Current services prior to admission: Durable Medical Equipment            Current DME: Ena Cowden (has walker from spouse but does not use)            Type of Home Care services:  None    ADLS  Prior functional level: Independent in ADLs/IADLs  Current functional level: Independent in ADLs/IADLs    Family can provide assistance at DC: Yes  Would you like Case Management to discuss the discharge plan with any other family members/significant others, and if so, who?  Yes (any family bedside)  Plans to Return to Present Housing: Yes    Potential Assistance needed at discharge: N/A    Patient

## 2023-05-23 NOTE — RT PROTOCOL NOTE
work of breathing using Per Protocol order mode. 4-6 - enter or revise RT Bronchodilator order(s) to two equivalent RT bronchodilator orders with one order with BID Frequency and one order with Frequency of every 4 hours PRN wheezing or increased work of breathing using Per Protocol order mode. 7-10 - enter or revise RT Bronchodilator order(s) to two equivalent RT bronchodilator orders with one order with TID Frequency and one order with Frequency of every 4 hours PRN wheezing or increased work of breathing using Per Protocol order mode. 11-13 - enter or revise RT Bronchodilator order(s) to one equivalent RT bronchodilator order with QID Frequency and an Albuterol order with Frequency of every 4 hours PRN wheezing or increased work of breathing using Per Protocol order mode. Greater than 13 - enter or revise RT Bronchodilator order(s) to one equivalent RT bronchodilator order with every 4 hours Frequency and an Albuterol order with Frequency of every 2 hours PRN wheezing or increased work of breathing using Per Protocol order mode. RT to enter RT Home Evaluation for COPD & MDI Assessment order using Per Protocol order mode.     Electronically signed by French Deleon RCP on 5/22/2023 at 8:36 PM

## 2023-05-24 VITALS
WEIGHT: 152.7 LBS | DIASTOLIC BLOOD PRESSURE: 65 MMHG | SYSTOLIC BLOOD PRESSURE: 141 MMHG | HEIGHT: 65 IN | BODY MASS INDEX: 25.44 KG/M2 | HEART RATE: 89 BPM | OXYGEN SATURATION: 98 % | TEMPERATURE: 97.2 F | RESPIRATION RATE: 16 BRPM

## 2023-05-24 LAB
REPORT: NORMAL
RESP PATH DNA+RNA PNL NPH NAA+NON-PROBE: NORMAL

## 2023-05-24 PROCEDURE — 6360000002 HC RX W HCPCS: Performed by: NURSE PRACTITIONER

## 2023-05-24 PROCEDURE — 2580000003 HC RX 258: Performed by: STUDENT IN AN ORGANIZED HEALTH CARE EDUCATION/TRAINING PROGRAM

## 2023-05-24 PROCEDURE — 6370000000 HC RX 637 (ALT 250 FOR IP): Performed by: STUDENT IN AN ORGANIZED HEALTH CARE EDUCATION/TRAINING PROGRAM

## 2023-05-24 RX ORDER — GUAIFENESIN/DEXTROMETHORPHAN 100-10MG/5
5 SYRUP ORAL EVERY 4 HOURS PRN
Qty: 240 ML | Refills: 0 | Status: SHIPPED | OUTPATIENT
Start: 2023-05-24 | End: 2023-06-03

## 2023-05-24 RX ORDER — LACTOBACILLUS RHAMNOSUS GG 10B CELL
1 CAPSULE ORAL
Qty: 7 CAPSULE | Refills: 0 | Status: SHIPPED | OUTPATIENT
Start: 2023-05-25 | End: 2023-06-01

## 2023-05-24 RX ORDER — FLUTICASONE PROPIONATE 50 MCG
2 SPRAY, SUSPENSION (ML) NASAL DAILY
Qty: 16 G | Refills: 0 | Status: SHIPPED | OUTPATIENT
Start: 2023-05-25

## 2023-05-24 RX ORDER — OMEPRAZOLE 20 MG/1
40 TABLET, DELAYED RELEASE ORAL DAILY
Qty: 30 TABLET | Refills: 3 | COMMUNITY
Start: 2023-05-24

## 2023-05-24 RX ORDER — CEFUROXIME AXETIL 500 MG/1
500 TABLET ORAL 2 TIMES DAILY
Qty: 8 TABLET | Refills: 0 | Status: SHIPPED | OUTPATIENT
Start: 2023-05-24 | End: 2023-05-28

## 2023-05-24 RX ADMIN — PANTOPRAZOLE SODIUM 40 MG: 40 TABLET, DELAYED RELEASE ORAL at 05:05

## 2023-05-24 RX ADMIN — Medication 1 CAPSULE: at 08:36

## 2023-05-24 RX ADMIN — Medication 2 PUFF: at 08:22

## 2023-05-24 RX ADMIN — SODIUM CHLORIDE, PRESERVATIVE FREE 6 ML: 5 INJECTION INTRAVENOUS at 08:37

## 2023-05-24 RX ADMIN — AMLODIPINE BESYLATE 5 MG: 5 TABLET ORAL at 08:36

## 2023-05-24 RX ADMIN — GUAIFENESIN 600 MG: 600 TABLET ORAL at 08:36

## 2023-05-24 RX ADMIN — ENOXAPARIN SODIUM 30 MG: 100 INJECTION SUBCUTANEOUS at 08:36

## 2023-05-24 RX ADMIN — FLUTICASONE PROPIONATE 2 SPRAY: 50 SPRAY, METERED NASAL at 08:37

## 2023-05-24 NOTE — CARE COORDINATION
Discharge ordered, chart reviewed, no needs noted. Writer met with pt, confirmed she has no new needs, and has ride home.   ADIS Dos Santos-RN

## 2023-05-24 NOTE — DISCHARGE SUMMARY
Hospital Medicine Discharge Summary    Patient: vEelin Farley   : 1935     Admit Date: 2023   Discharge Date: 2023    Disposition:  []Home   [x]HHC  []SNF  []Acute Rehab  []LTAC  []Hospice  Code status:  [x]Full  []DNR/CCA  []Limited (DNR/CCA with Do Not Intubate)  []DNRCC  Condition at Discharge: Stable  Primary Care Provider: Henna Garvey MD    Admitting Physician: Becca Howell MD  Discharge Physician: Jm Suarez MD     Discharge Diagnoses: Active Hospital Problems    Diagnosis     Shortness of breath [R06.02]        Presenting Admission History:          Patient laying in the bed during my visit today, her family members at bedside. She states that she has been feeling very sick over the past 2 weeks and has not gotten better whatsoever. Today, she does state that her cough has improved and she is breathing little easier with the breathing treatments. Echo is still pending. Creatinine up to 1.4 today, encouraged more p.o. fluid intake, patient is aware. We will recheck BMP in the morning. TTE is still pending. Presenting Admission History:       Evelin Farley is a 81 y.o. female with PMH of HTN, HLD who presents with SOB. Pt has been complaining of chest congestion, cough and SOB started 2 weeks ago. Pt also endorses postnasal drainage. Pt complains of SOB worse on exertion. Pt also had a syncope episode after having a cough spill 2 weeks ago. She presented to [facility] where she was prescribed doxycycline and prednisone for atypical pneumonia and asthma. She also received breathing treatment and lasix 20 mg IV during that ED visit. Pt continued to have worsening respirotary symptoms with no improvement or worsening. She saw her PCP who started her on albuterol, Symbicort, and azithromycin. Pt also had CT chest 2023 which showed moderate to large hiatal hernia, accentuated thoracic kyphosis but no pleural effusion or consolidation.  Pt decided to come

## 2023-05-24 NOTE — PLAN OF CARE
Problem: Discharge Planning  Goal: Discharge to home or other facility with appropriate resources  5/23/2023 0838 by Jeff Storm RN  Outcome: Progressing     Problem: Pain  Goal: Verbalizes/displays adequate comfort level or baseline comfort level  5/23/2023 2219 by Cherry Siegel RN  Outcome: Progressing  Flowsheets (Taken 5/23/2023 2219)  Verbalizes/displays adequate comfort level or baseline comfort level:   Encourage patient to monitor pain and request assistance   Assess pain using appropriate pain scale  Note: No pain reported this shift. 5/23/2023 1972 by Jeff Storm RN  Outcome: Progressing     Problem: Safety - Adult  Goal: Free from fall injury  5/23/2023 2219 by Cherry Siegel RN  Outcome: Progressing  Flowsheets (Taken 5/23/2023 2219)  Free From Fall Injury: Instruct family/caregiver on patient safety  Note: Patient has remained free from falls this shift with standard safety measures in place. 5/23/2023 5653 by Jeff Storm RN  Outcome: Progressing     Problem: ABCDS Injury Assessment  Goal: Absence of physical injury  5/23/2023 2219 by Cherry Siegel RN  Outcome: Progressing  Flowsheets (Taken 5/23/2023 2219)  Absence of Physical Injury: Implement safety measures based on patient assessment  Note: Patient has remained free from physical injury this shift with standard safety measures in place.    5/23/2023 1854 by Jeff Storm RN  Outcome: Progressing     Problem: Respiratory - Adult  Goal: Achieves optimal ventilation and oxygenation  5/23/2023 2219 by Cherry Siegel RN  Outcome: Progressing  Flowsheets (Taken 5/23/2023 2219)  Achieves optimal ventilation and oxygenation:   Assess for changes in respiratory status   Assess for changes in mentation and behavior   Position to facilitate oxygenation and minimize respiratory effort   Encourage broncho-pulmonary hygiene including cough, deep breathe, incentive spirometry   Assess and instruct to report shortness of breath or any

## 2023-05-24 NOTE — PLAN OF CARE
Problem: Discharge Planning  Goal: Discharge to home or other facility with appropriate resources  Outcome: Adequate for Discharge  Flowsheets (Taken 5/24/2023 0830)  Discharge to home or other facility with appropriate resources:   Identify barriers to discharge with patient and caregiver   Arrange for needed discharge resources and transportation as appropriate     Problem: Pain  Goal: Verbalizes/displays adequate comfort level or baseline comfort level  Outcome: Adequate for Discharge     Problem: Safety - Adult  Goal: Free from fall injury  Outcome: Adequate for Discharge  Flowsheets (Taken 5/24/2023 0833)  Free From Fall Injury: Instruct family/caregiver on patient safety     Problem: ABCDS Injury Assessment  Goal: Absence of physical injury  Outcome: Adequate for Discharge  Flowsheets (Taken 5/24/2023 1401)  Absence of Physical Injury: Implement safety measures based on patient assessment     Problem: Respiratory - Adult  Goal: Achieves optimal ventilation and oxygenation  Outcome: Adequate for Discharge  Flowsheets (Taken 5/24/2023 0830)  Achieves optimal ventilation and oxygenation:   Assess for changes in respiratory status   Assess for changes in mentation and behavior     Problem: Cardiovascular - Adult  Goal: Maintains optimal cardiac output and hemodynamic stability  Outcome: Adequate for Discharge  Flowsheets (Taken 5/24/2023 0830)  Maintains optimal cardiac output and hemodynamic stability: Monitor blood pressure and heart rate  Goal: Absence of cardiac dysrhythmias or at baseline  Outcome: Adequate for Discharge  Flowsheets (Taken 5/24/2023 0830)  Absence of cardiac dysrhythmias or at baseline:   Monitor cardiac rate and rhythm   Assess for signs of decreased cardiac output     Problem: Musculoskeletal - Adult  Goal: Return mobility to safest level of function  Outcome: Adequate for Discharge  Flowsheets (Taken 5/24/2023 0830)  Return Mobility to Safest Level of Function:   Assess patient stability

## 2023-05-26 NOTE — PROGRESS NOTES
4 Eyes Admission Assessment     I agree as the admission nurse that 2 RN's have performed a thorough Head to Toe Skin Assessment on the patient. ALL assessment sites listed below have been assessed on admission. Areas assessed by both nurses:   [x]   Head, Face, and Ears   [x]   Shoulders, Back, and Chest  [x]   Arms, Elbows, and Hands   [x]   Coccyx, Sacrum, and Ischum  [x]   Legs, Feet, and Heels        Does the Patient have Skin Breakdown?   No         Navjot Prevention initiated:  NA   Wound Care Orders initiated:  NA      WOC nurse consulted for Pressure Injury (Stage 3,4, Unstageable, DTI, NWPT, and Complex wounds):  NA      Nurse 1 eSignature: Electronically signed by Mercedes Prado RN on 5/22/23 at 10:32 PM EDT    **SHARE this note so that the co-signing nurse is able to place an eSignature**    Nurse 2 eSignature: Electronically signed by Federico Victoria RN on 5/23/23 at 7:30 AM EDT
Discharge paperwork provided and reviewed with patient. All questions answered, patient verbalized understanding. Saline lock and heart monitor removed. Pt wheeled to front entrance with PCA.
[facility] Hospitalist brief note     Consult received. Case reviewed with ER physician  Admission order was placed   Full note to follow.      Patient's PCP:  MD Aravind Malone MD  Hospitalist
Patient admitted to room 369 from ED. Patient oriented to room, call light, bed rails, phone, lights and bathroom. Patient instructed about the schedule of the day including: vital sign frequency, lab draws, possible tests, frequency of MD and staff rounds, including RN/MD rounding together at bedside, daily weights, and I &O's. Patient instructed about prescribed diet, how to use 8MENU, and television. bed alarm in place, patient aware of placement and reason. Telemetry box 37 in place, patient aware of placement and reason. Bed locked, in lowest position, side rails up 2/4, call light within reach. Will continue to monitor.
Physician Progress Note      Isiah Locke  CSN #:                  [id number]  :                       1935  ADMIT DATE:       2023 9:45 AM  DISCH DATE:        2023 3:44 PM  RESPONDING  PROVIDER #:        Laurel Contreras MD          QUERY TEXT:    Pt admitted with SOB. Pt noted to be treated for PNA arriving with WBC   17.5/Bands 17%, , RR 24. If possible, please document in the progress   notes and discharge summary if you are evaluating and /or treating any of the   following: The medical record reflects the following:  Risk Factors: possible PNA  Clinical Indicators: on arrival to ED - , RR 24, WBC 17.5/Bands 17%,   resp culture negative  Treatment: IV Rocephin, avoidance of IVF 2/2 concern of fluid overload, labs,   po Ceftin to complete at [facility]    Thank you, Thuy Garcia RN BSN CRCR CCDS  Options provided:  -- Sepsis POA d/t PNA confirmed  -- Possible PNA without Sepsis  -- Other - I will add my own diagnosis  -- Disagree - Not applicable / Not valid  -- Disagree - Clinically unable to determine / Unknown  -- Refer to Clinical Documentation Reviewer    PROVIDER RESPONSE TEXT:    Sepsis POA due to Acute Bronchitis, possible bacterial Pneumonia. Query created by: Gopi Lentz on 2023 3:46 PM      QUERY TEXT:    Pt admitted with SOB and has CHF documented in H&P and no PMH of CHF. Pt was   treated w/ IV lasix. If possible, please document in progress notes and   discharge summary further specificity regarding the type and acuity of CHF:    The medical record reflects the following:  Risk Factors: HTN, 81 yr old female, PNA  Clinical Indicators: per H&P- \"Hyponatremia. Na 130 this admission. .. maybe   2/2 overload? pt has been having good oral intake. Would avoid IVF until CHF   is ruled out if no improvement in Na. \"  per IM note -\"Hyponatremia. .. maybe 2/2 overload? Allena Closs Allena Closs Allena Closs Improved to 135 with   IV lasix challenge  .  Na+
----------------------------------------------------------------------  C. Data (any 2)  [x] Discussed management of the case with: Case management  [x] Imaging personally reviewed and interpreted, includes: Chest x-ray  [x] Data Review (any 3)  [x] Collateral history obtained from:    [x] All available Consultant notes from yesterday/today were reviewed  [x] All current labs were reviewed and interpreted for clinical significance   [x] Appropriate follow-up labs were ordered    Medications:  Personally reviewed in detail in conjunction w/ labs as documented for evidence of drug toxicity. Infusion Medications    sodium chloride       Scheduled Medications    amLODIPine  5 mg Oral Daily    losartan  100 mg Oral Daily    rosuvastatin  20 mg Oral Nightly    sodium chloride flush  5-40 mL IntraVENous 2 times per day    enoxaparin  40 mg SubCUTAneous Daily    ipratropium 0.5 mg-albuterol 2.5 mg  1 ampule Inhalation Q4H WA    fluticasone  2 spray Each Nostril Daily    cefTRIAXone (ROCEPHIN) IV  1,000 mg IntraVENous Q24H    mometasone-formoterol  2 puff Inhalation BID    pantoprazole  40 mg Oral QAM AC    guaiFENesin  600 mg Oral BID    lactobacillus  1 capsule Oral Daily with breakfast     PRN Meds: sodium chloride flush, sodium chloride, ondansetron **OR** ondansetron, polyethylene glycol, acetaminophen **OR** acetaminophen, perflutren lipid microspheres, guaiFENesin-dextromethorphan     Labs:  Personally reviewed and interpreted for clinical significance.      Recent Labs     05/22/23  1106 05/23/23  0644   WBC 17.5* 17.1*   HGB 14.9 13.2   HCT 44.9 40.3    294     Recent Labs     05/22/23  1106 05/22/23  1320 05/23/23  0644   *  --  135*   K 3.7  --  3.8   CL 91*  --  97*   CO2 27  --  26   BUN 18  --  22*   CREATININE 1.1  --  1.4*   CALCIUM 10.0  --  9.3   MG  --  2.00  --      Recent Labs     05/22/23  1106 05/22/23  1320 05/22/23  2049   PROBNP 222  --   --    TROPHS 21* 21* 24*     No results for

## 2023-06-05 ENCOUNTER — APPOINTMENT (OUTPATIENT)
Dept: GENERAL RADIOLOGY | Age: 88
DRG: 178 | End: 2023-06-05
Payer: MEDICARE

## 2023-06-05 ENCOUNTER — APPOINTMENT (OUTPATIENT)
Dept: CT IMAGING | Age: 88
DRG: 178 | End: 2023-06-05
Payer: MEDICARE

## 2023-06-05 ENCOUNTER — HOSPITAL ENCOUNTER (INPATIENT)
Age: 88
LOS: 2 days | Discharge: HOME HEALTH | DRG: 178 | End: 2023-06-07
Attending: EMERGENCY MEDICINE | Admitting: INTERNAL MEDICINE
Payer: MEDICARE

## 2023-06-05 DIAGNOSIS — D70.9 NEUTROPENIA, UNSPECIFIED TYPE (HCC): ICD-10-CM

## 2023-06-05 DIAGNOSIS — R55 SYNCOPE AND COLLAPSE: ICD-10-CM

## 2023-06-05 DIAGNOSIS — R19.7 DIARRHEA, UNSPECIFIED TYPE: ICD-10-CM

## 2023-06-05 DIAGNOSIS — R53.1 GENERALIZED WEAKNESS: ICD-10-CM

## 2023-06-05 DIAGNOSIS — N13.30 HYDRONEPHROSIS OF RIGHT KIDNEY: ICD-10-CM

## 2023-06-05 DIAGNOSIS — U07.1 COVID-19: Primary | ICD-10-CM

## 2023-06-05 DIAGNOSIS — R10.9 RIGHT SIDED ABDOMINAL PAIN: ICD-10-CM

## 2023-06-05 DIAGNOSIS — E86.0 DEHYDRATION: ICD-10-CM

## 2023-06-05 LAB
ALBUMIN SERPL-MCNC: 3.9 G/DL (ref 3.4–5)
ALBUMIN/GLOB SERPL: 1.7 {RATIO} (ref 1.1–2.2)
ALP SERPL-CCNC: 68 U/L (ref 40–129)
ALT SERPL-CCNC: 28 U/L (ref 10–40)
ANION GAP SERPL CALCULATED.3IONS-SCNC: 12 MMOL/L (ref 3–16)
AST SERPL-CCNC: 37 U/L (ref 15–37)
BASOPHILS # BLD: 0 K/UL (ref 0–0.2)
BASOPHILS NFR BLD: 0.9 %
BILIRUB SERPL-MCNC: 0.3 MG/DL (ref 0–1)
BILIRUB UR QL STRIP.AUTO: NEGATIVE
BUN SERPL-MCNC: 19 MG/DL (ref 7–20)
CALCIUM SERPL-MCNC: 8.9 MG/DL (ref 8.3–10.6)
CHLORIDE SERPL-SCNC: 90 MMOL/L (ref 99–110)
CLARITY UR: CLEAR
CO2 SERPL-SCNC: 26 MMOL/L (ref 21–32)
COLOR UR: NORMAL
CREAT SERPL-MCNC: 1 MG/DL (ref 0.6–1.2)
CRP SERPL-MCNC: <3 MG/L (ref 0–5.1)
DEPRECATED RDW RBC AUTO: 13.1 % (ref 12.4–15.4)
EOSINOPHIL # BLD: 0 K/UL (ref 0–0.6)
EOSINOPHIL NFR BLD: 0.4 %
GFR SERPLBLD CREATININE-BSD FMLA CKD-EPI: 55 ML/MIN/{1.73_M2}
GLUCOSE SERPL-MCNC: 95 MG/DL (ref 70–99)
GLUCOSE UR STRIP.AUTO-MCNC: NEGATIVE MG/DL
HCT VFR BLD AUTO: 41.4 % (ref 36–48)
HGB BLD-MCNC: 14.3 G/DL (ref 12–16)
HGB UR QL STRIP.AUTO: NEGATIVE
KETONES UR STRIP.AUTO-MCNC: NEGATIVE MG/DL
LACTATE BLDV-SCNC: 0.7 MMOL/L (ref 0.4–2)
LDH SERPL L TO P-CCNC: 585 U/L (ref 100–190)
LEUKOCYTE ESTERASE UR QL STRIP.AUTO: NEGATIVE
LYMPHOCYTES # BLD: 1 K/UL (ref 1–5.1)
LYMPHOCYTES NFR BLD: 32.7 %
MAGNESIUM SERPL-MCNC: 1.7 MG/DL (ref 1.8–2.4)
MCH RBC QN AUTO: 31.7 PG (ref 26–34)
MCHC RBC AUTO-ENTMCNC: 34.6 G/DL (ref 31–36)
MCV RBC AUTO: 91.6 FL (ref 80–100)
MONOCYTES # BLD: 0.5 K/UL (ref 0–1.3)
MONOCYTES NFR BLD: 17.9 %
NEUTROPHILS # BLD: 1.5 K/UL (ref 1.7–7.7)
NEUTROPHILS NFR BLD: 48.1 %
NITRITE UR QL STRIP.AUTO: NEGATIVE
OSMOLALITY SERPL: 272 MOSM/KG (ref 280–301)
OSMOLALITY UR: 239 MOSM/KG (ref 390–1070)
PH UR STRIP.AUTO: 6.5 [PH] (ref 5–8)
PLATELET # BLD AUTO: 187 K/UL (ref 135–450)
PMV BLD AUTO: 7.4 FL (ref 5–10.5)
POTASSIUM SERPL-SCNC: 3.2 MMOL/L (ref 3.5–5.1)
PROT SERPL-MCNC: 6.2 G/DL (ref 6.4–8.2)
PROT UR STRIP.AUTO-MCNC: NEGATIVE MG/DL
RBC # BLD AUTO: 4.52 M/UL (ref 4–5.2)
SARS-COV-2 RDRP RESP QL NAA+PROBE: DETECTED
SODIUM SERPL-SCNC: 128 MMOL/L (ref 136–145)
SODIUM UR-SCNC: 83 MMOL/L
SP GR UR STRIP.AUTO: 1.01 (ref 1–1.03)
SPECIMEN STATUS: NORMAL
TROPONIN, HIGH SENSITIVITY: 18 NG/L (ref 0–14)
TROPONIN, HIGH SENSITIVITY: 21 NG/L (ref 0–14)
UA COMPLETE W REFLEX CULTURE PNL UR: NORMAL
UA DIPSTICK W REFLEX MICRO PNL UR: NORMAL
URN SPEC COLLECT METH UR: NORMAL
UROBILINOGEN UR STRIP-ACNC: 0.2 E.U./DL
WBC # BLD AUTO: 3 K/UL (ref 4–11)

## 2023-06-05 PROCEDURE — 6370000000 HC RX 637 (ALT 250 FOR IP): Performed by: EMERGENCY MEDICINE

## 2023-06-05 PROCEDURE — 1200000000 HC SEMI PRIVATE

## 2023-06-05 PROCEDURE — 83615 LACTATE (LD) (LDH) ENZYME: CPT

## 2023-06-05 PROCEDURE — 2580000003 HC RX 258: Performed by: NURSE PRACTITIONER

## 2023-06-05 PROCEDURE — 6360000002 HC RX W HCPCS: Performed by: EMERGENCY MEDICINE

## 2023-06-05 PROCEDURE — 71045 X-RAY EXAM CHEST 1 VIEW: CPT

## 2023-06-05 PROCEDURE — 6360000002 HC RX W HCPCS: Performed by: NURSE PRACTITIONER

## 2023-06-05 PROCEDURE — 6370000000 HC RX 637 (ALT 250 FOR IP): Performed by: NURSE PRACTITIONER

## 2023-06-05 PROCEDURE — 70450 CT HEAD/BRAIN W/O DYE: CPT

## 2023-06-05 PROCEDURE — 86140 C-REACTIVE PROTEIN: CPT

## 2023-06-05 PROCEDURE — 36415 COLL VENOUS BLD VENIPUNCTURE: CPT

## 2023-06-05 PROCEDURE — 82728 ASSAY OF FERRITIN: CPT

## 2023-06-05 PROCEDURE — 83930 ASSAY OF BLOOD OSMOLALITY: CPT

## 2023-06-05 PROCEDURE — 2580000003 HC RX 258: Performed by: EMERGENCY MEDICINE

## 2023-06-05 PROCEDURE — 2500000003 HC RX 250 WO HCPCS: Performed by: EMERGENCY MEDICINE

## 2023-06-05 PROCEDURE — 83605 ASSAY OF LACTIC ACID: CPT

## 2023-06-05 PROCEDURE — 80053 COMPREHEN METABOLIC PANEL: CPT

## 2023-06-05 PROCEDURE — 83735 ASSAY OF MAGNESIUM: CPT

## 2023-06-05 PROCEDURE — 84300 ASSAY OF URINE SODIUM: CPT

## 2023-06-05 PROCEDURE — 84484 ASSAY OF TROPONIN QUANT: CPT

## 2023-06-05 PROCEDURE — 96375 TX/PRO/DX INJ NEW DRUG ADDON: CPT

## 2023-06-05 PROCEDURE — 87635 SARS-COV-2 COVID-19 AMP PRB: CPT

## 2023-06-05 PROCEDURE — 96361 HYDRATE IV INFUSION ADD-ON: CPT

## 2023-06-05 PROCEDURE — 81003 URINALYSIS AUTO W/O SCOPE: CPT

## 2023-06-05 PROCEDURE — 93005 ELECTROCARDIOGRAM TRACING: CPT | Performed by: EMERGENCY MEDICINE

## 2023-06-05 PROCEDURE — 83935 ASSAY OF URINE OSMOLALITY: CPT

## 2023-06-05 PROCEDURE — 85025 COMPLETE CBC W/AUTO DIFF WBC: CPT

## 2023-06-05 PROCEDURE — 96374 THER/PROPH/DIAG INJ IV PUSH: CPT

## 2023-06-05 PROCEDURE — 99285 EMERGENCY DEPT VISIT HI MDM: CPT

## 2023-06-05 PROCEDURE — 74176 CT ABD & PELVIS W/O CONTRAST: CPT

## 2023-06-05 RX ORDER — MAGNESIUM SULFATE IN WATER 40 MG/ML
2000 INJECTION, SOLUTION INTRAVENOUS ONCE
Status: COMPLETED | OUTPATIENT
Start: 2023-06-05 | End: 2023-06-05

## 2023-06-05 RX ORDER — ENOXAPARIN SODIUM 100 MG/ML
30 INJECTION SUBCUTANEOUS 2 TIMES DAILY
Status: DISCONTINUED | OUTPATIENT
Start: 2023-06-05 | End: 2023-06-07 | Stop reason: HOSPADM

## 2023-06-05 RX ORDER — POLYETHYLENE GLYCOL 3350 17 G/17G
17 POWDER, FOR SOLUTION ORAL DAILY PRN
Status: DISCONTINUED | OUTPATIENT
Start: 2023-06-05 | End: 2023-06-07 | Stop reason: HOSPADM

## 2023-06-05 RX ORDER — SODIUM CHLORIDE 0.9 % (FLUSH) 0.9 %
5-40 SYRINGE (ML) INJECTION PRN
Status: DISCONTINUED | OUTPATIENT
Start: 2023-06-05 | End: 2023-06-07 | Stop reason: HOSPADM

## 2023-06-05 RX ORDER — POTASSIUM CHLORIDE 20 MEQ/1
40 TABLET, EXTENDED RELEASE ORAL ONCE
Status: COMPLETED | OUTPATIENT
Start: 2023-06-05 | End: 2023-06-05

## 2023-06-05 RX ORDER — FLUTICASONE PROPIONATE 50 MCG
2 SPRAY, SUSPENSION (ML) NASAL DAILY
Status: DISCONTINUED | OUTPATIENT
Start: 2023-06-05 | End: 2023-06-07 | Stop reason: HOSPADM

## 2023-06-05 RX ORDER — ACETAMINOPHEN 325 MG/1
650 TABLET ORAL EVERY 6 HOURS PRN
Status: DISCONTINUED | OUTPATIENT
Start: 2023-06-05 | End: 2023-06-07 | Stop reason: HOSPADM

## 2023-06-05 RX ORDER — ROSUVASTATIN CALCIUM 10 MG/1
20 TABLET, COATED ORAL NIGHTLY
Status: DISCONTINUED | OUTPATIENT
Start: 2023-06-05 | End: 2023-06-07 | Stop reason: HOSPADM

## 2023-06-05 RX ORDER — 0.9 % SODIUM CHLORIDE 0.9 %
500 INTRAVENOUS SOLUTION INTRAVENOUS ONCE
Status: COMPLETED | OUTPATIENT
Start: 2023-06-05 | End: 2023-06-05

## 2023-06-05 RX ORDER — POLYVINYL ALCOHOL 14 MG/ML
1 SOLUTION/ DROPS OPHTHALMIC EVERY 6 HOURS PRN
Status: DISCONTINUED | OUTPATIENT
Start: 2023-06-05 | End: 2023-06-07 | Stop reason: HOSPADM

## 2023-06-05 RX ORDER — SODIUM CHLORIDE 0.9 % (FLUSH) 0.9 %
5-40 SYRINGE (ML) INJECTION EVERY 12 HOURS SCHEDULED
Status: DISCONTINUED | OUTPATIENT
Start: 2023-06-05 | End: 2023-06-07 | Stop reason: HOSPADM

## 2023-06-05 RX ORDER — FAMOTIDINE 10 MG/ML
20 INJECTION, SOLUTION INTRAVENOUS ONCE
Status: COMPLETED | OUTPATIENT
Start: 2023-06-05 | End: 2023-06-05

## 2023-06-05 RX ORDER — ONDANSETRON 2 MG/ML
4 INJECTION INTRAMUSCULAR; INTRAVENOUS ONCE
Status: COMPLETED | OUTPATIENT
Start: 2023-06-05 | End: 2023-06-05

## 2023-06-05 RX ORDER — ONDANSETRON 4 MG/1
4 TABLET, ORALLY DISINTEGRATING ORAL EVERY 8 HOURS PRN
Status: DISCONTINUED | OUTPATIENT
Start: 2023-06-05 | End: 2023-06-07 | Stop reason: HOSPADM

## 2023-06-05 RX ORDER — ONDANSETRON 2 MG/ML
4 INJECTION INTRAMUSCULAR; INTRAVENOUS EVERY 6 HOURS PRN
Status: DISCONTINUED | OUTPATIENT
Start: 2023-06-05 | End: 2023-06-07 | Stop reason: HOSPADM

## 2023-06-05 RX ORDER — SODIUM CHLORIDE 9 MG/ML
INJECTION, SOLUTION INTRAVENOUS PRN
Status: DISCONTINUED | OUTPATIENT
Start: 2023-06-05 | End: 2023-06-07 | Stop reason: HOSPADM

## 2023-06-05 RX ORDER — LORAZEPAM 0.5 MG/1
0.5 TABLET ORAL 2 TIMES DAILY PRN
COMMUNITY

## 2023-06-05 RX ORDER — GABAPENTIN 100 MG/1
200 CAPSULE ORAL ONCE
Status: COMPLETED | OUTPATIENT
Start: 2023-06-05 | End: 2023-06-05

## 2023-06-05 RX ORDER — SODIUM CHLORIDE 9 MG/ML
INJECTION, SOLUTION INTRAVENOUS CONTINUOUS
Status: DISCONTINUED | OUTPATIENT
Start: 2023-06-05 | End: 2023-06-06

## 2023-06-05 RX ORDER — ACETAMINOPHEN 650 MG/1
650 SUPPOSITORY RECTAL EVERY 6 HOURS PRN
Status: DISCONTINUED | OUTPATIENT
Start: 2023-06-05 | End: 2023-06-07 | Stop reason: HOSPADM

## 2023-06-05 RX ORDER — NITROFURANTOIN 25; 75 MG/1; MG/1
100 CAPSULE ORAL DAILY
COMMUNITY

## 2023-06-05 RX ORDER — ALBUTEROL SULFATE 90 UG/1
2 AEROSOL, METERED RESPIRATORY (INHALATION) EVERY 6 HOURS PRN
Status: DISCONTINUED | OUTPATIENT
Start: 2023-06-05 | End: 2023-06-07 | Stop reason: HOSPADM

## 2023-06-05 RX ADMIN — FAMOTIDINE 20 MG: 10 INJECTION, SOLUTION INTRAVENOUS at 09:35

## 2023-06-05 RX ADMIN — SODIUM CHLORIDE: 9 INJECTION, SOLUTION INTRAVENOUS at 18:39

## 2023-06-05 RX ADMIN — ENOXAPARIN SODIUM 30 MG: 100 INJECTION SUBCUTANEOUS at 20:34

## 2023-06-05 RX ADMIN — ONDANSETRON 4 MG: 2 INJECTION INTRAMUSCULAR; INTRAVENOUS at 09:35

## 2023-06-05 RX ADMIN — POTASSIUM CHLORIDE 40 MEQ: 1500 TABLET, EXTENDED RELEASE ORAL at 15:29

## 2023-06-05 RX ADMIN — ROSUVASTATIN 20 MG: 10 TABLET, FILM COATED ORAL at 20:34

## 2023-06-05 RX ADMIN — SODIUM CHLORIDE 500 ML: 9 INJECTION, SOLUTION INTRAVENOUS at 09:36

## 2023-06-05 RX ADMIN — GABAPENTIN 200 MG: 100 CAPSULE ORAL at 15:29

## 2023-06-05 RX ADMIN — FLUTICASONE PROPIONATE 2 SPRAY: 50 SPRAY, METERED NASAL at 20:34

## 2023-06-05 RX ADMIN — MAGNESIUM SULFATE HEPTAHYDRATE 2000 MG: 40 INJECTION, SOLUTION INTRAVENOUS at 15:28

## 2023-06-05 RX ADMIN — ACETAMINOPHEN 650 MG: 325 TABLET ORAL at 20:57

## 2023-06-05 ASSESSMENT — ENCOUNTER SYMPTOMS
ABDOMINAL PAIN: 1
NAUSEA: 1
EYE PAIN: 0
BACK PAIN: 1
VOMITING: 0
SORE THROAT: 0
BLOOD IN STOOL: 0
DIARRHEA: 1
ANAL BLEEDING: 0
SHORTNESS OF BREATH: 0

## 2023-06-05 ASSESSMENT — PAIN DESCRIPTION - LOCATION
LOCATION: BACK
LOCATION: HEAD

## 2023-06-05 ASSESSMENT — PAIN DESCRIPTION - DESCRIPTORS
DESCRIPTORS: ACHING
DESCRIPTORS: ACHING

## 2023-06-05 ASSESSMENT — PAIN DESCRIPTION - PAIN TYPE: TYPE: ACUTE PAIN

## 2023-06-05 ASSESSMENT — PAIN SCALES - GENERAL
PAINLEVEL_OUTOF10: 0
PAINLEVEL_OUTOF10: 5
PAINLEVEL_OUTOF10: 2
PAINLEVEL_OUTOF10: 0

## 2023-06-05 ASSESSMENT — PAIN DESCRIPTION - ORIENTATION: ORIENTATION: MID

## 2023-06-05 NOTE — ED NOTES
Pt assisted to bedside commode and pt given snack at this time. Call light within reach.      Yung Lama RN  06/05/23 3878

## 2023-06-05 NOTE — H&P
Hospital Medicine History & Physical      Date of Admission: 6/5/2023    Date of Service:  Pt seen/examined on 6/5/23     [x]Admitted to Inpatient with expected LOS greater than two midnights due to medical therapy. []Placed in Observation status. Chief Admission Complaint:  right flank pain, diarrhea, weakness    Presenting Admission History:      81 y.o. female with a PMH of HTN, CAD, HLD, GERD, and Peripheral Neuropathy who presented to [facility] with a complaint of right flank pain and fatigue. Patient reports progressive weakness over the last couple weeks. She reports associated nausea and diarrhea. She also reports right flank pain that radiates around to her abdomen. She denies N/V/F/C. She was admitted to [facility] 5/22-5/24 for Bronchitis-flu/covid negative at that time-complete abx course. In ED, WBC 3.0, , K 3.2, Mg 1.7, Scr 1.0, UA unremarkable, CT scan showed mild right-sided hydronephrosis without evidence of an obstructing stone, possibly recently passed stone. Covid positive.      Assessment/Plan:      Current Principal Problem:  COVID-19    Covid 19 detected, General weakness, Diarrhea  -no hypoxemia  -detected 6/5  -check crp, ferritin, ldh  -IVF  -Check stool studies    CAD  -Nonobstructive atherosclerosis of coronary artery/SOB   -nonobstructive CAD on angiograms in 2006 and 2012  -normal DSE most recently 11/22/2022  -Trop 21-18, trend (chronically elevated)    HLD  -continue statin    Right flank pain  -CT scan showed There is mild right-sided hydronephrosis without evidence of an obstructing stone, possibly recently passed stone.  -Urology consulted in ED    Hyponatremia, Hypokalemia, Hypomagnesemia-  -suspect dehydration also on HCTZ  -check urine osmo/serum osmo, urine sodium  -replete electrolytes  -Nephrology consulted    HTN  -home amlodipine, olmesartan; HCTZ held given hyponatremia and hypotension  - Monitor BP    GERD w moderate to large hiatal hernia-PPI    Discussed

## 2023-06-05 NOTE — RT PROTOCOL NOTE
RT Inhaler-Nebulizer Bronchodilator Protocol Note    There is a bronchodilator order in the chart from a provider indicating to follow the RT Bronchodilator Protocol and there is an Initiate RT Inhaler-Nebulizer Bronchodilator Protocol order as well (see protocol at bottom of note). CXR Findings:  XR CHEST PORTABLE    Result Date: 6/5/2023  1. Nodular density overlying the left upper lung could represent a calcified granuloma. Recommend nonemergent CT of the chest with contrast for further evaluation. The findings from the last RT Protocol Assessment were as follows:   History Pulmonary Disease: Chronic pulmonary disease  Respiratory Pattern: Regular pattern and RR 12-20 bpm  Breath Sounds: Clear breath sounds  Cough: Strong, spontaneous, non-productive  Indication for Bronchodilator Therapy: On home bronchodilators  Bronchodilator Assessment Score: 2    Aerosolized bronchodilator medication orders have been revised according to the RT Inhaler-Nebulizer Bronchodilator Protocol below. Respiratory Therapist to perform RT Therapy Protocol Assessment initially then follow the protocol. Repeat RT Therapy Protocol Assessment PRN for score 0-3 or on second treatment, BID, and PRN for scores above 3. No Indications - adjust the frequency to every 6 hours PRN wheezing or bronchospasm, if no treatments needed after 48 hours then discontinue using Per Protocol order mode. If indication present, adjust the RT bronchodilator orders based on the Bronchodilator Assessment Score as indicated below. Use Inhaler orders unless patient has one or more of the following: on home nebulizer, not able to hold breath for 10 seconds, is not alert and oriented, cannot activate and use MDI correctly, or respiratory rate 25 breaths per minute or more, then use the equivalent nebulizer order(s) with same Frequency and PRN reasons based on the score.   If a patient is on this medication at home then do not decrease Frequency

## 2023-06-05 NOTE — ED PROVIDER NOTES
[facility]  ED  EMERGENCY DEPARTMENT ENCOUNTER        Pt Name: Raymundo Kessler  MRN: [id number]  [place] 1935  Date of evaluation: 6/5/2023  Provider: Richarda Bernheim, MD  PCP: Shahid Toscano MD      [address]       Chief Complaint   Patient presents with    Fatigue     Pt to ED per EMS with complaint of weakness for some weeks now. Per EMS, pt has been having diarrhea and nausea. Pt states she is having recent fall. BG was 81. HISTORY OFPRESENT ILLNESS   (Location/Symptom, Timing/Onset, Context/Setting, Quality, Duration, Modifying Factors,Severity)  Note limiting factors. Raymundo Kessler is a 81 y.o. female presenting today due to concern for having some generalized weakness for the past couple of weeks at which point I saw her during her 2 other ED visits and she was admitted during the last time but over the last 4 days she started developing diarrhea with some right-sided abdominal and flank pain, nausea, lightheadedness, and overall not feeling well.  2 days ago on Saturday she became very weak and ultimately went to the ground and thinks she may have briefly passed out. She does occasionally get mild headaches but denies any significant headache currently. She denies any chest pain. No blood in the stool. She denies any neck pain. No fever. Due to concern for persistent right-sided back discomfort radiating towards the abdomen and overall not feeling well, she came back to the emergency department for further evaluation by EMS. She lives by herself. REVIEW OF SYSTEMS    (2-9 systems for level 4, 10 or more for level 5)     Review of Systems   Constitutional:  Positive for activity change, chills and fatigue. Negative for fever. HENT:  Positive for congestion. Negative for sore throat. Eyes:  Negative for pain. Respiratory:  Negative for shortness of breath. Cardiovascular:  Negative for chest pain.    Gastrointestinal:  Positive

## 2023-06-05 NOTE — ED NOTES
Pt assisted to bedside commode. Small soft bowel movement noted. Pt remains on cardiac and Spo2 monitoring. A/Ox4. Awaiting bed at this time. Call light within reach.      Judith Velasquez RN  06/05/23 6815

## 2023-06-06 LAB
25(OH)D3 SERPL-MCNC: 80.7 NG/ML
ALBUMIN SERPL-MCNC: 3.4 G/DL (ref 3.4–5)
ALBUMIN/GLOB SERPL: 1.3 {RATIO} (ref 1.1–2.2)
ALP SERPL-CCNC: 64 U/L (ref 40–129)
ALT SERPL-CCNC: 22 U/L (ref 10–40)
ANION GAP SERPL CALCULATED.3IONS-SCNC: 12 MMOL/L (ref 3–16)
AST SERPL-CCNC: 28 U/L (ref 15–37)
BASOPHILS # BLD: 0 K/UL (ref 0–0.2)
BASOPHILS NFR BLD: 0.5 %
BILIRUB SERPL-MCNC: <0.2 MG/DL (ref 0–1)
BUN SERPL-MCNC: 16 MG/DL (ref 7–20)
CALCIUM SERPL-MCNC: 8.8 MG/DL (ref 8.3–10.6)
CHLORIDE SERPL-SCNC: 95 MMOL/L (ref 99–110)
CO2 SERPL-SCNC: 22 MMOL/L (ref 21–32)
CREAT SERPL-MCNC: 1.1 MG/DL (ref 0.6–1.2)
CRP SERPL-MCNC: <3 MG/L (ref 0–5.1)
DEPRECATED RDW RBC AUTO: 12.9 % (ref 12.4–15.4)
EOSINOPHIL # BLD: 0.1 K/UL (ref 0–0.6)
EOSINOPHIL NFR BLD: 1.9 %
FERRITIN SERPL IA-MCNC: 647.7 NG/ML (ref 15–150)
GFR SERPLBLD CREATININE-BSD FMLA CKD-EPI: 49 ML/MIN/{1.73_M2}
GLUCOSE SERPL-MCNC: 97 MG/DL (ref 70–99)
HCT VFR BLD AUTO: 39.2 % (ref 36–48)
HGB BLD-MCNC: 13.5 G/DL (ref 12–16)
LYMPHOCYTES # BLD: 2.1 K/UL (ref 1–5.1)
LYMPHOCYTES NFR BLD: 51 %
MCH RBC QN AUTO: 31.9 PG (ref 26–34)
MCHC RBC AUTO-ENTMCNC: 34.5 G/DL (ref 31–36)
MCV RBC AUTO: 92.3 FL (ref 80–100)
MONOCYTES # BLD: 0.5 K/UL (ref 0–1.3)
MONOCYTES NFR BLD: 11.8 %
NEUTROPHILS # BLD: 1.4 K/UL (ref 1.7–7.7)
NEUTROPHILS NFR BLD: 34.8 %
PLATELET # BLD AUTO: 200 K/UL (ref 135–450)
PMV BLD AUTO: 7.2 FL (ref 5–10.5)
POTASSIUM SERPL-SCNC: 4 MMOL/L (ref 3.5–5.1)
PROT SERPL-MCNC: 6 G/DL (ref 6.4–8.2)
RBC # BLD AUTO: 4.25 M/UL (ref 4–5.2)
SODIUM SERPL-SCNC: 129 MMOL/L (ref 136–145)
SODIUM UR-SCNC: 49 MMOL/L
WBC # BLD AUTO: 4.1 K/UL (ref 4–11)

## 2023-06-06 PROCEDURE — 97116 GAIT TRAINING THERAPY: CPT

## 2023-06-06 PROCEDURE — 97530 THERAPEUTIC ACTIVITIES: CPT

## 2023-06-06 PROCEDURE — 97162 PT EVAL MOD COMPLEX 30 MIN: CPT

## 2023-06-06 PROCEDURE — 97535 SELF CARE MNGMENT TRAINING: CPT

## 2023-06-06 PROCEDURE — 84300 ASSAY OF URINE SODIUM: CPT

## 2023-06-06 PROCEDURE — 80053 COMPREHEN METABOLIC PANEL: CPT

## 2023-06-06 PROCEDURE — 36415 COLL VENOUS BLD VENIPUNCTURE: CPT

## 2023-06-06 PROCEDURE — 85025 COMPLETE CBC W/AUTO DIFF WBC: CPT

## 2023-06-06 PROCEDURE — 2580000003 HC RX 258: Performed by: NURSE PRACTITIONER

## 2023-06-06 PROCEDURE — 6360000002 HC RX W HCPCS: Performed by: NURSE PRACTITIONER

## 2023-06-06 PROCEDURE — 82306 VITAMIN D 25 HYDROXY: CPT

## 2023-06-06 PROCEDURE — 1200000000 HC SEMI PRIVATE

## 2023-06-06 PROCEDURE — 86140 C-REACTIVE PROTEIN: CPT

## 2023-06-06 PROCEDURE — 97166 OT EVAL MOD COMPLEX 45 MIN: CPT

## 2023-06-06 PROCEDURE — 6370000000 HC RX 637 (ALT 250 FOR IP): Performed by: NURSE PRACTITIONER

## 2023-06-06 PROCEDURE — 83935 ASSAY OF URINE OSMOLALITY: CPT

## 2023-06-06 RX ORDER — PANTOPRAZOLE SODIUM 40 MG/1
40 TABLET, DELAYED RELEASE ORAL
Status: DISCONTINUED | OUTPATIENT
Start: 2023-06-06 | End: 2023-06-07 | Stop reason: HOSPADM

## 2023-06-06 RX ORDER — TRAMADOL HYDROCHLORIDE 50 MG/1
25 TABLET ORAL EVERY 8 HOURS PRN
Status: DISCONTINUED | OUTPATIENT
Start: 2023-06-06 | End: 2023-06-07 | Stop reason: HOSPADM

## 2023-06-06 RX ORDER — AMLODIPINE BESYLATE 5 MG/1
5 TABLET ORAL DAILY
Status: DISCONTINUED | OUTPATIENT
Start: 2023-06-06 | End: 2023-06-07 | Stop reason: HOSPADM

## 2023-06-06 RX ADMIN — FLUTICASONE PROPIONATE 2 SPRAY: 50 SPRAY, METERED NASAL at 11:37

## 2023-06-06 RX ADMIN — AMLODIPINE BESYLATE 5 MG: 5 TABLET ORAL at 11:32

## 2023-06-06 RX ADMIN — PANTOPRAZOLE SODIUM 40 MG: 40 TABLET, DELAYED RELEASE ORAL at 15:49

## 2023-06-06 RX ADMIN — TRAMADOL HYDROCHLORIDE 25 MG: 50 TABLET ORAL at 11:38

## 2023-06-06 RX ADMIN — ENOXAPARIN SODIUM 30 MG: 100 INJECTION SUBCUTANEOUS at 21:04

## 2023-06-06 RX ADMIN — ENOXAPARIN SODIUM 30 MG: 100 INJECTION SUBCUTANEOUS at 11:32

## 2023-06-06 RX ADMIN — TRAMADOL HYDROCHLORIDE 25 MG: 50 TABLET ORAL at 19:02

## 2023-06-06 RX ADMIN — ROSUVASTATIN 20 MG: 10 TABLET, FILM COATED ORAL at 21:04

## 2023-06-06 RX ADMIN — SODIUM CHLORIDE: 9 INJECTION, SOLUTION INTRAVENOUS at 05:27

## 2023-06-06 RX ADMIN — Medication 10 ML: at 21:07

## 2023-06-06 ASSESSMENT — PAIN DESCRIPTION - LOCATION
LOCATION: ABDOMEN;BACK
LOCATION: BACK

## 2023-06-06 ASSESSMENT — PAIN DESCRIPTION - DESCRIPTORS
DESCRIPTORS: STABBING
DESCRIPTORS: SHARP;STABBING

## 2023-06-06 ASSESSMENT — PAIN - FUNCTIONAL ASSESSMENT
PAIN_FUNCTIONAL_ASSESSMENT: PREVENTS OR INTERFERES SOME ACTIVE ACTIVITIES AND ADLS
PAIN_FUNCTIONAL_ASSESSMENT: PREVENTS OR INTERFERES SOME ACTIVE ACTIVITIES AND ADLS

## 2023-06-06 ASSESSMENT — PAIN DESCRIPTION - ORIENTATION
ORIENTATION: RIGHT;LOWER
ORIENTATION: LOWER

## 2023-06-06 ASSESSMENT — PAIN SCALES - GENERAL
PAINLEVEL_OUTOF10: 6
PAINLEVEL_OUTOF10: 0
PAINLEVEL_OUTOF10: 4

## 2023-06-06 NOTE — PLAN OF CARE
Problem: Skin/Tissue Integrity  Goal: Absence of new skin breakdown  Description: 1. Monitor for areas of redness and/or skin breakdown  2. Assess vascular access sites hourly  3. Every 4-6 hours minimum:  Change oxygen saturation probe site  4. Every 4-6 hours:  If on nasal continuous positive airway pressure, respiratory therapy assess nares and determine need for appliance change or resting period.   Outcome: Progressing     Problem: Safety - Adult  Goal: Free from fall injury  Outcome: Progressing  Flowsheets (Taken 6/6/2023 0315)  Free From Fall Injury: Instruct family/caregiver on patient safety

## 2023-06-06 NOTE — CARE COORDINATION
Case Management Assessment  Initial Evaluation    Date/Time of Evaluation: 6/6/2023 3:55 PM  Assessment Completed by: Charity Stoner RN    If patient is discharged prior to next notation, then this note serves as note for discharge by case management. Patient Name: Ama Garay                   YOB: 1935  Diagnosis: Syncope and collapse [R55]  Dehydration [E86.0]  Generalized weakness [R53.1]  Right sided abdominal pain [R10.9]  Hydronephrosis of right kidney [N13.30]  Neutropenia, unspecified type ([address] [D70.9]  Diarrhea, unspecified type [R19.7]  COVID-19 [U07.1]                   Date / Time: 6/5/2023  8:20 AM    Patient Admission Status: Inpatient   Readmission Risk (Low < 19, Mod (19-27), High > 27): Readmission Risk Score: 14.3    Current PCP: Jose Raul Gonzalez MD  PCP verified by CM? Yes Mauri Llanes MD)    Chart Reviewed: Yes      History Provided by: Patient  Patient Orientation: Alert and Oriented    Patient Cognition: Alert    Hospitalization in the last 30 days (Readmission):  Yes    If yes, Readmission Assessment in CM Navigator will be completed. Advance Directives:      Code Status: Full Code   Patient's Primary Decision Maker is: Legal Next of Kin      Discharge Planning:    Patient lives with: Alone Type of Home: House  Primary Care Giver: Self  Patient Support Systems include: Children   Current Financial resources: Medicare  Current community resources: None  Current services prior to admission: Durable Medical Equipment            Current DME: Wheelchair, Walker, Cane            Type of Home Care services:  None    ADLS  Prior functional level:  Independent in ADLs/IADLs  Current functional level: Assistance with the following:, Independent in ADLs/IADLs    PT AM-PAC: 22 /24  OT AM-PAC: 20 /24    Family can provide assistance at DC: Yes (Son and daughter-in-law able to help)  Would you like Case Management to discuss the discharge plan with any other

## 2023-06-06 NOTE — CONSULTS
1510: called consult to the urology group.    RE:Right [place]   1535: judith Joiner called back to speak with Dr. Nancy Weiner
Consult Placed     Who: BUTT  Date:6/5/23  Time:1830     Electronically signed by Chloe Chang on 6/5/2023 at 6:30 PM
05/24/23 97.2 °F (36.2 °C) (Oral)   05/10/23 97.8 °F (36.6 °C) (Oral)     BP Readings from Last 3 Encounters:   06/06/23 (!) 149/85   05/24/23 (!) 141/65   05/10/23 (!) 141/60     Pulse Readings from Last 3 Encounters:   06/06/23 88   05/24/23 89   05/10/23 80      General appearance:  No apparent distress  HEENT:  Pupils equal, round, and reactive to light. Conjunctivae/corneas clear. Respiratory:  Normal respiratory effort. Clear to auscultation, bilaterally without Rales/Wheezes/Rhonchi. Cardiovascular:  Regular rate and rhythm with normal S1/S2 without murmurs, rubs or gallops. Abdomen:  Soft, non-tender, non-distended with normal bowel sounds. Musculoskelatal:  No clubbing, cyanosis or edema bilaterally. Neurologic: grossly non-focal.  Psychiatric:  Alert and oriented, thought content appropriate, normal insight  Skin:  No rashes or lesions.     DATA:      Lab Results   Component Value Date    WBC 4.1 06/06/2023    HGB 13.5 06/06/2023    HCT 39.2 06/06/2023    MCV 92.3 06/06/2023     06/06/2023     Lab Results   Component Value Date/Time     06/06/2023 07:53 AM    K 4.0 06/06/2023 07:53 AM    CL 95 06/06/2023 07:53 AM    CO2 22 06/06/2023 07:53 AM    BUN 16 06/06/2023 07:53 AM    CREATININE 1.1 06/06/2023 07:53 AM    GLUCOSE 97 06/06/2023 07:53 AM    CALCIUM 8.8 06/06/2023 07:53 AM    LABGLOM 49 06/06/2023 07:53 AM          IMPRESSION/RECOMMENDATIONS:      # Hyponatremia  -check urine studies, TSH, cortisol in am, uric acid  -continue supportive management  # hypokalemia-possibly nutritional, d/t diarrhea, hypomagnesemia  -supplement as needed  -daily labs  #HTN- agree with holding the HCTZ  # covid 19- no hypoxemia  -per primary team  #R sided hydro-urology seeing
standard drinks     Types: 3 Glasses of wine per week    Drug use: Never         Review of Systems: A 12 point ROS was performed and was unremarkable unless listed in the history of present illness. No intake/output data recorded. Vitals:  BP (!) 140/87   Pulse 59   Temp 97.3 °F (36.3 °C) (Oral)   Resp 18   Ht 5' 5\" (1.651 m)   Wt 149 lb (67.6 kg)   SpO2 96%   BMI 24.79 kg/m²   Temp  Av.8 °F (36.6 °C)  Min: 97.3 °F (36.3 °C)  Max: 98.4 °F (36.9 °C)  No intake or output data in the 24 hours ending 23 0740      Physical:  Well developed, well nourished in no acute distress  Mood indicates no abnormalities. Pt doesnt appear depressed  Orientated to time and place  Neck is supple, trachea is midline  Respiratory effort is normal  Abdomen soft, not tender, not distended     Labs:  WBC:    Lab Results   Component Value Date/Time    WBC 3.0 2023 09:19 AM     Hemoglobin/Hematocrit:    Lab Results   Component Value Date/Time    HGB 14.3 2023 09:19 AM    HCT 41.4 2023 09:19 AM     BMP:    Lab Results   Component Value Date/Time     2023 09:19 AM    K 3.2 2023 09:19 AM    K 3.8 2023 06:44 AM    CL 90 2023 09:19 AM    CO2 26 2023 09:19 AM    BUN 19 2023 09:19 AM    LABALBU 3.9 2023 09:19 AM    CREATININE 1.0 2023 09:19 AM    CALCIUM 8.9 2023 09:19 AM    LABGLOM 55 2023 09:19 AM     PT/INR:    Lab Results   Component Value Date/Time    PROTIME 12.4 2023 11:06 AM    INR 0.92 2023 11:06 AM     PTT:  No results found for: APTT[APTT    Urinalysis: neg    Imaging:   CT AP        1. There is mild right-sided hydronephrosis without evidence of an   obstructing stone. This could represent sequela of a recently passed stone. Impression/Plan:     Right flank pain  Mild right hydronephrosis   - kidney function is normal, UA without evidence of infection, and hydronephrosis is mild.   She has had mild right

## 2023-06-06 NOTE — ACP (ADVANCE CARE PLANNING)
Advance Care Planning     General Advance Care Planning (ACP) Conversation    Date of Conversation: 6/5/2023  Conducted with: Patient with Decision Making Capacity    Healthcare Decision Maker:    Primary Decision Maker: Coreen Ping - Child - [phone]    Secondary Decision Maker: Emerald Babcock - Daughter-in-Law - [phone]  Click here to complete Healthcare Decision Makers including selection of the Healthcare Decision Maker Relationship (ie \"Primary\"). Today we discussed Healthcare Decision Makers. The patient is considering options.     Reviewed DNR/DNI and patient elects Full Code (Attempt Resuscitation)    Length of Voluntary ACP Conversation in minutes:  <16 minutes (Non-Billable)    Patric Dobbs RN

## 2023-06-07 VITALS
BODY MASS INDEX: 24.83 KG/M2 | RESPIRATION RATE: 17 BRPM | WEIGHT: 149 LBS | TEMPERATURE: 98.5 F | OXYGEN SATURATION: 94 % | HEIGHT: 65 IN | HEART RATE: 89 BPM | DIASTOLIC BLOOD PRESSURE: 82 MMHG | SYSTOLIC BLOOD PRESSURE: 163 MMHG

## 2023-06-07 LAB
ALBUMIN SERPL-MCNC: 3.6 G/DL (ref 3.4–5)
ANION GAP SERPL CALCULATED.3IONS-SCNC: 8 MMOL/L (ref 3–16)
BUN SERPL-MCNC: 14 MG/DL (ref 7–20)
CALCIUM SERPL-MCNC: 8.8 MG/DL (ref 8.3–10.6)
CHLORIDE SERPL-SCNC: 96 MMOL/L (ref 99–110)
CO2 SERPL-SCNC: 27 MMOL/L (ref 21–32)
CORTIS AM PEAK SERPL-MCNC: 12.3 UG/DL (ref 4.3–22.4)
CREAT SERPL-MCNC: 1 MG/DL (ref 0.6–1.2)
CRP SERPL-MCNC: <3 MG/L (ref 0–5.1)
DEPRECATED RDW RBC AUTO: 13.2 % (ref 12.4–15.4)
EKG ATRIAL RATE: 68 BPM
EKG ATRIAL RATE: 79 BPM
EKG DIAGNOSIS: NORMAL
EKG DIAGNOSIS: NORMAL
EKG P AXIS: 49 DEGREES
EKG P AXIS: 53 DEGREES
EKG P-R INTERVAL: 142 MS
EKG P-R INTERVAL: 170 MS
EKG Q-T INTERVAL: 382 MS
EKG Q-T INTERVAL: 416 MS
EKG QRS DURATION: 82 MS
EKG QRS DURATION: 92 MS
EKG QTC CALCULATION (BAZETT): 438 MS
EKG QTC CALCULATION (BAZETT): 442 MS
EKG R AXIS: -36 DEGREES
EKG R AXIS: -41 DEGREES
EKG T AXIS: -2 DEGREES
EKG T AXIS: 35 DEGREES
EKG VENTRICULAR RATE: 68 BPM
EKG VENTRICULAR RATE: 79 BPM
GFR SERPLBLD CREATININE-BSD FMLA CKD-EPI: 55 ML/MIN/{1.73_M2}
GLUCOSE SERPL-MCNC: 89 MG/DL (ref 70–99)
HCT VFR BLD AUTO: 38.6 % (ref 36–48)
HGB BLD-MCNC: 13.2 G/DL (ref 12–16)
MAGNESIUM SERPL-MCNC: 1.9 MG/DL (ref 1.8–2.4)
MCH RBC QN AUTO: 31.4 PG (ref 26–34)
MCHC RBC AUTO-ENTMCNC: 34.1 G/DL (ref 31–36)
MCV RBC AUTO: 92.3 FL (ref 80–100)
OSMOLALITY UR: 203 MOSM/KG (ref 390–1070)
PHOSPHATE SERPL-MCNC: 2.8 MG/DL (ref 2.5–4.9)
PLATELET # BLD AUTO: 209 K/UL (ref 135–450)
PMV BLD AUTO: 7.4 FL (ref 5–10.5)
POTASSIUM SERPL-SCNC: 3.9 MMOL/L (ref 3.5–5.1)
RBC # BLD AUTO: 4.18 M/UL (ref 4–5.2)
SODIUM SERPL-SCNC: 131 MMOL/L (ref 136–145)
TSH SERPL DL<=0.005 MIU/L-ACNC: 2.79 UIU/ML (ref 0.27–4.2)
URATE SERPL-MCNC: 3.8 MG/DL (ref 2.6–6)
WBC # BLD AUTO: 4.8 K/UL (ref 4–11)

## 2023-06-07 PROCEDURE — 6370000000 HC RX 637 (ALT 250 FOR IP): Performed by: NURSE PRACTITIONER

## 2023-06-07 PROCEDURE — 93005 ELECTROCARDIOGRAM TRACING: CPT | Performed by: NURSE PRACTITIONER

## 2023-06-07 PROCEDURE — 80069 RENAL FUNCTION PANEL: CPT

## 2023-06-07 PROCEDURE — 93010 ELECTROCARDIOGRAM REPORT: CPT | Performed by: INTERNAL MEDICINE

## 2023-06-07 PROCEDURE — 6360000002 HC RX W HCPCS: Performed by: NURSE PRACTITIONER

## 2023-06-07 PROCEDURE — 2580000003 HC RX 258: Performed by: NURSE PRACTITIONER

## 2023-06-07 PROCEDURE — 82533 TOTAL CORTISOL: CPT

## 2023-06-07 PROCEDURE — 36415 COLL VENOUS BLD VENIPUNCTURE: CPT

## 2023-06-07 PROCEDURE — 85027 COMPLETE CBC AUTOMATED: CPT

## 2023-06-07 PROCEDURE — 84550 ASSAY OF BLOOD/URIC ACID: CPT

## 2023-06-07 PROCEDURE — 83735 ASSAY OF MAGNESIUM: CPT

## 2023-06-07 PROCEDURE — 86140 C-REACTIVE PROTEIN: CPT

## 2023-06-07 PROCEDURE — 84443 ASSAY THYROID STIM HORMONE: CPT

## 2023-06-07 RX ORDER — GUAIFENESIN 600 MG/1
600 TABLET, EXTENDED RELEASE ORAL 2 TIMES DAILY PRN
Qty: 20 TABLET | Refills: 0 | Status: SHIPPED | OUTPATIENT
Start: 2023-06-07 | End: 2023-06-17

## 2023-06-07 RX ORDER — AMLODIPINE BESYLATE 5 MG/1
5 TABLET ORAL DAILY
Qty: 30 TABLET | Refills: 0 | Status: SHIPPED | OUTPATIENT
Start: 2023-06-08

## 2023-06-07 RX ORDER — GUAIFENESIN 600 MG/1
600 TABLET, EXTENDED RELEASE ORAL 2 TIMES DAILY
Status: DISCONTINUED | OUTPATIENT
Start: 2023-06-07 | End: 2023-06-07 | Stop reason: HOSPADM

## 2023-06-07 RX ORDER — GUAIFENESIN 600 MG/1
600 TABLET, EXTENDED RELEASE ORAL 2 TIMES DAILY
Qty: 30 TABLET | Refills: 0 | Status: SHIPPED | OUTPATIENT
Start: 2023-06-07 | End: 2023-06-07 | Stop reason: SDUPTHER

## 2023-06-07 RX ORDER — OLMESARTAN MEDOXOMIL 40 MG/1
40 TABLET ORAL DAILY
Qty: 30 TABLET | Refills: 0 | Status: SHIPPED | OUTPATIENT
Start: 2023-06-07

## 2023-06-07 RX ADMIN — ENOXAPARIN SODIUM 30 MG: 100 INJECTION SUBCUTANEOUS at 08:25

## 2023-06-07 RX ADMIN — PANTOPRAZOLE SODIUM 40 MG: 40 TABLET, DELAYED RELEASE ORAL at 08:24

## 2023-06-07 RX ADMIN — AMLODIPINE BESYLATE 5 MG: 5 TABLET ORAL at 08:25

## 2023-06-07 RX ADMIN — Medication 10 ML: at 08:27

## 2023-06-07 RX ADMIN — ACETAMINOPHEN 650 MG: 325 TABLET ORAL at 11:41

## 2023-06-07 RX ADMIN — GUAIFENESIN 600 MG: 600 TABLET ORAL at 18:04

## 2023-06-07 ASSESSMENT — PAIN DESCRIPTION - ORIENTATION: ORIENTATION: RIGHT

## 2023-06-07 ASSESSMENT — PAIN DESCRIPTION - DESCRIPTORS: DESCRIPTORS: STABBING

## 2023-06-07 ASSESSMENT — PAIN SCALES - GENERAL
PAINLEVEL_OUTOF10: 5
PAINLEVEL_OUTOF10: 2

## 2023-06-07 ASSESSMENT — PAIN DESCRIPTION - LOCATION: LOCATION: FOOT;BACK

## 2023-06-07 NOTE — PROGRESS NOTES
.4 Eyes Skin Assessment     The patient is being assess for  Admission    I agree that 2 RN's have performed a thorough Head to Toe Skin Assessment on the patient. ALL assessment sites listed below have been assessed. Areas assessed by both nurses: Keith RN  [x]   Head, Face, and Ears   [x]   Shoulders, Back, and Chest  [x]   Arms, Elbows, and Hands   [x]   Coccyx, Sacrum, and IschIum  [x]   Legs, Feet, and Heels        Does the Patient have Skin Breakdown?   No         Navjot Prevention initiated:  No   Wound Care Orders initiated:  No      [facility] nurse consulted for Pressure Injury (Stage 3,4, Unstageable, DTI, NWPT, and Complex wounds), New and Established Ostomies:  No      Nurse 1 eSignature: Electronically signed by Huyen Rizo RN on 6/5/23 at 6:50 PM EDT    **SHARE this note so that the co-signing nurse is able to place an eSignature**    Nurse 2 eSignature: Electronically signed by Surya Benítez RN on 6/5/23 at 7:04 PM EDT
Hospital Medicine Progress Note      Date of Admission: 6/5/2023  Hospital Day: 2    Chief Admission Complaint:  right flank pain, diarrhea/weakness     Subjective:  sitting in chair, mild flank pain, NAD or events overnight    Presenting Admission History:       81 y.o. female with a PMH of HTN, CAD, HLD, GERD, and Peripheral Neuropathy who presented to [facility] with a complaint of right flank pain and fatigue. Patient reports progressive weakness over the last couple weeks. She reports associated nausea and diarrhea. She also reports right flank pain that radiates around to her abdomen. She denies N/V/F/C. She was admitted to [facility] 5/22-5/24 for Bronchitis-flu/covid negative at that time-complete abx course. In ED, WBC 3.0, , K 3.2, Mg 1.7, Scr 1.0, UA unremarkable, CT scan showed mild right-sided hydronephrosis without evidence of an obstructing stone, possibly recently passed stone. Covid positive.      Assessment/Plan:      Current Principal Problem:  COVID-19    Covid 19 detected, General weakness, Diarrhea  -no hypoxemia  -detected 6/5  - crp wnl, ferritin pending , ldh 585  -IVF  -Check stool studies ordered     CAD  -Nonobstructive atherosclerosis of coronary artery/SOB   -nonobstructive CAD on angiograms in 2006 and 2012  -normal DSE most recently 11/22/2022  -Trop 21-18, trend (chronically elevated)     HLD  -continue statin     Right flank pain  -CT scan showed There is mild right-sided hydronephrosis without evidence of an obstructing stone, possibly recently passed stone.  -Urology consulted in ED-No inpatient urologic intervention planned  -Tylenol/Tramadol prn     Hyponatremia, Hypokalemia, Hypomagnesemia-  -suspect dehydration also on HCTZ  -urine osmo/serum osmo, urine sodium  -replete electrolytes  -Nephrology consulted     HTN  -home amlodipine resumed olmesartan; HCTZ held given hyponatremia and hypotension  - Monitor BP     GERD w moderate to large hiatal hernia-PPI    Physical
Pt a/o. VSS. Shift assessment complete and charted. Pt states she feels a little shakey this morning. Lungs diminished. Pt denied cough but states throat is dry. BS active and pt reported BM this morning and states stool is formed. Pt stable and denied needs when writer left room.
Pt alert and orientated. Call light within reach. No complaints at this time. Assisted pt with breakfast being set up. Mary Carmen Medina RN
Pt arrived to floor. Orientated to room and surrounds. Davey Call RN
Pt sitting up in chair and complaining of rt sided pain. 4/10 pt medicated with Tramadol.  Eddie Santo RN
Received patient on bed, alert and oriented x4. Vitals are stable. Denies pain nor dizziness. On droplet plus (Covid positive) and C. Diff precautions. Still awaiting stool sample. Previous sample was rejected by lab due to insufficient amount. Call bell within reach. Bed alarm on. Maintained a calm and comfortable environment. Shift assessment updated and documented.
Report received from [place] in the ED. Farrah Pepper RN
[facility])   Nephrology Note  [phone] / [phone] / [phone]   www. [facility]    Patient: Gita John (MRN: [id number]). Thank you for consulting [facility]. Full Consult to follow. Please call with any urgent questions or concerns. Jorge Marques MD  [facility])  [phone]  www. [employer]  6/5/2023, 7:38 PM
Tramadol documented 2 times as being wasted in Pyxis. Milana Bone RN    Cosigned By Angle Galo RN
Nutrient Needs:  Energy Requirements Based On: Kcal/kg (25-30)  Weight Used for Energy Requirements: Current  Energy (kcal/day): 8129-4197 kcals  Weight Used for Protein Requirements: Ideal (1-1.2)  Protein (g/day): 57-68 g  Method Used for Fluid Requirements: 1 ml/kcal  Fluid (ml/day): 6501-5331 ml    Nutrition Diagnosis:   Inadequate oral intake related to inadequate protein-energy intake as evidenced by poor intake prior to admission, weight loss greater than or equal to 5% in 1 month    Nutrition Interventions:   Food and/or Nutrient Delivery: Continue Current Diet, Continue Oral Nutrition Supplement  Nutrition Education/Counseling: No recommendation at this time  Coordination of Nutrition Care: Continue to monitor while inpatient       Goals:     Goals: PO intake 75% or greater, prior to discharge       Nutrition Monitoring and Evaluation:   Behavioral-Environmental Outcomes: None Identified  Food/Nutrient Intake Outcomes: Food and Nutrient Intake, Supplement Intake  Physical Signs/Symptoms Outcomes: Nutrition Focused Physical Findings, Biochemical Data, Weight    Discharge Planning:    Continue current diet     Adwoa Schwarz, [address]  Contact: Office: [phone]; [address]: [phone]
respiratory effort. Clear to auscultation, bilaterally without Rales/Wheezes/Rhonchi. Cardiovascular:  Regular rate and rhythm with normal S1/S2 without murmurs, rubs or gallops. Abdomen:  Soft, non-tender, non-distended with normal bowel sounds. Musculoskelatal:  No clubbing, cyanosis or edema bilaterally. Neurologic: grossly non-focal.  Skin:  No rashes or lesions.       Lab Review   Lab Results   Component Value Date    WBC 4.8 06/07/2023    HGB 13.2 06/07/2023    HCT 38.6 06/07/2023    MCV 92.3 06/07/2023     06/07/2023     Lab Results   Component Value Date/Time     06/07/2023 05:40 AM    K 3.9 06/07/2023 05:40 AM    K 4.0 06/06/2023 07:53 AM    CL 96 06/07/2023 05:40 AM    CO2 27 06/07/2023 05:40 AM    BUN 14 06/07/2023 05:40 AM    CREATININE 1.0 06/07/2023 05:40 AM    GLUCOSE 89 06/07/2023 05:40 AM    CALCIUM 8.8 06/07/2023 05:40 AM    LABGLOM 55 06/07/2023 05:40 AM            Patient Active Problem List    Diagnosis Date Noted    COVID-19 06/05/2023    Shortness of breath 05/22/2023    Lumbar radiculopathy     Thoracic back pain 06/07/2021    Right carpal tunnel syndrome 10/01/2019    Chronic headaches 05/29/2019    GERD (gastroesophageal reflux disease) 05/29/2019    Hypercholesterolemia 05/29/2019    Asthma 10/04/2012    Coronary atherosclerosis of native coronary artery 10/04/2012    Essential hypertension 10/04/2012    Family history of ischemic heart disease 10/04/2012    Backache 12/14/2009       ASSESSMENT AND PLAN   # Hyponatremia  -improved  -urine studies not done, TSH=3, cortisol not resulted  -continue supportive management  # hypokalemia-possibly nutritional, d/t diarrhea, hypomagnesemia  -supplement as needed  #HTN- agree with holding the HCTZ  # covid 19- no hypoxemia  -per primary team  #R sided hydro-urology seeing
restrictions: IV, Tele, droplet(+) for COVID+, contact(+) for C. diff    Subjective   General  Chart Reviewed: Yes  Patient assessed for rehabilitation services?: Yes  Family / Caregiver Present: No  Referring Practitioner: EMMA Yeh CNP  Diagnosis: COVID-19  Subjective  Subjective: Pt pleasant and agreeable to tx. RN approved. Pt reports 4/10 pain in R lower back     Social/Functional History  Social/Functional History  Lives With: Alone  Type of Home: House  Home Layout: One level, Performs ADL's on one level (chair lift to basement if needed)  Home Access: Stairs to enter with rails  Entrance Stairs - Number of Steps: 2+2  Entrance Stairs - Rails: Both  Bathroom Shower/Tub: Tub/Shower unit, Walk-in shower, Shower chair with back  Bathroom Toilet: Handicap height  Bathroom Equipment: Grab bars in shower, Grab bars around toilet  Home Equipment: Rollator, Walker, rolling, Cane, Wheelchair-manual (stair lift)  Has the patient had two or more falls in the past year or any fall with injury in the past year?: Yes (head injury 1st fall, reports 2)  ADL Assistance: Independent  Homemaking Assistance: Needs assistance (friend assists some)  Ambulation Assistance: Independent  Transfer Assistance: Independent  Active : No  Patient's  Info: has a liscense  Occupation: Retired  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  Leisure & Hobbies: reading, color  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
equipment (using RW)  Toilet Transfer: Stand-by assistance; Adaptive equipment    Gait Training  Overall Level of Assistance: Contact-guard assistance  Interventions: Safety awareness training;Verbal cues  Speed/Meg: Pace decreased (< 100 feet/min)  Step Length: Left shortened;Right shortened  Gait Abnormalities: Decreased step clearance (decreased stride length & heel strike B, steady when using RW for support)  Distance (ft): 75 Feet  Assistive Device: Walker, rolling;Gait belt    AM-PAC Score  AM-PAC Inpatient Mobility Raw Score : 22 (06/06/23 1213)  AM-PAC Inpatient T-Scale Score : 53.28 (06/06/23 1213)  Mobility Inpatient CMS 0-100% Score: 20.91 (06/06/23 1213)  Mobility Inpatient CMS G-Code Modifier : CJ (06/06/23 1213)    Goals  Short Term Goals  Time Frame for Short Term Goals: 1 week, 6/13/23 (unless otherwise specified)  Short Term Goal 1: Pt will transfer supine <-> sit with modified(I)  Short Term Goal 2: Pt will transfer sit <-> stand and bed>chair with modified(I)  Short Term Goal 3: Pt will ambulate x 100 feet using RW with supervision  Short Term Goal 4: By 6/09/23: Pt will tolerate 12-15 reps BLE exercise for strengthening, balance, and endurance  Short Term Goal 5: If pt out of isolation: Pt will ambulate up/down 4 steps using B handrails with SBA/supervision  Patient Goals   Patient Goals :  \"To go home and get stronger\"       Education  Patient Education  Education Given To: Patient  Education Provided: Role of Therapy;Plan of Care;Precautions;Transfer Training;Energy Conservation;Equipment  Education Method: Demonstration;Verbal  Barriers to Learning: None  Education Outcome: Verbalized understanding;Demonstrated understanding      Therapy Time   Individual Concurrent Group Co-treatment   Time In 0940         Time Out 1019         Minutes 39         Timed Code Treatment Minutes: [address] [place], [place] #[id number]    If pt is unable to be seen after this session, please let this

## 2023-06-07 NOTE — PLAN OF CARE
Problem: Pain  Goal: Verbalizes/displays adequate comfort level or baseline comfort level  6/7/2023 0212 by Nikita Negrete RN  Outcome: Progressing  6/6/2023 2106 by Nilam Dunbar RN  Outcome: Progressing  6/6/2023 2101 by Nilam Dunbar RN  Outcome: Progressing     Problem: Skin/Tissue Integrity  Goal: Absence of new skin breakdown  Description: 1. Monitor for areas of redness and/or skin breakdown  2. Assess vascular access sites hourly  3. Every 4-6 hours minimum:  Change oxygen saturation probe site  4. Every 4-6 hours:  If on nasal continuous positive airway pressure, respiratory therapy assess nares and determine need for appliance change or resting period.   6/7/2023 0212 by Nikita Negrete RN  Outcome: Progressing  6/6/2023 2106 by Nilam Dunbar RN  Outcome: Progressing  6/6/2023 2101 by Nilam Dunbar RN  Outcome: Progressing     Problem: Safety - Adult  Goal: Free from fall injury  6/7/2023 0212 by Nikita Negrete RN  Outcome: Progressing  6/6/2023 2106 by Nilam Dunbar RN  Outcome: Progressing  6/6/2023 2101 by Nilam Dunbar RN  Outcome: Progressing     Problem: ABCDS Injury Assessment  Goal: Absence of physical injury  6/7/2023 0212 by Nikita Negrete RN  Outcome: Progressing  6/6/2023 2106 by Nilam Dunbar RN  Outcome: Progressing  6/6/2023 2101 by Nilam Dunbar RN  Outcome: Progressing     Problem: Nutrition Deficit:  Goal: Optimize nutritional status  6/7/2023 0212 by Nikita Negrete RN  Outcome: Progressing  6/6/2023 2106 by Nilam Dunbar RN  Outcome: Progressing  6/6/2023 2101 by Nilam Dunbar RN  Outcome: Progressing

## 2023-06-07 NOTE — DISCHARGE INSTR - COC
Continuity of Care Form    Patient Name: Buster Rivera   :  1935  MRN:  [id number]    Admit date:  2023  Discharge date:  23    Code Status Order: Full Code   Advance Directives:     Admitting Physician:  Khloe Estrella MD  PCP: Kamla Flor MD    Discharging Nurse: Aniket Juan Unit/Room#: 2951/9639-25  Discharging Unit Phone Number: [phone]    Emergency Contact:   Extended Emergency Contact Information  Primary Emergency Contact: [address] [place] [address] Phone: [phone]  Relation: Child  Secondary Emergency Contact: Kyung Johnson  [place] Phone: [phone]  Relation: Daughter-in-Law    Past Surgical History:  Past Surgical History:   Procedure Laterality Date    CARPAL TUNNEL RELEASE Right 10/1/2019    RIGHT OPEN CARPAL TUNNEL RELEASE performed by Marbella Coronado MD at [facility] Bilateral     CHOLECYSTECTOMY      HYSTERECTOMY (CERVIX STATUS UNKNOWN)      PAIN MANAGEMENT PROCEDURE Right 2021    RIGHT LUMBAR FIVE SACRAL ONE EPIDURAL STEROID INJECTION SITE CONFIRMED BY FLUOROSCOPY [facility]    LEFT LUMBAR FIVE SACRAL ONE EPIDURAL STEROID INJECTION SITE CONFIRMED BY FLUOROSCOPY performed by Francisco Caruso MD at [facility]       Immunization History:   Immunization History   Administered Date(s) Administered    COVID-19, PFIZER PURPLE top, DILUTE for use, (age 15 y+), 30mcg/0.3mL 2021, 02/10/2021       Active Problems:  Patient Active Problem List   Diagnosis Code    Asthma J45.909    Chronic headaches R51.9, G89.29    Coronary atherosclerosis of native coronary artery I25.10    Essential hypertension I10    Family history of ischemic heart disease Z82.49    GERD (gastroesophageal reflux disease) K21.9    Backache M54.9    Hypercholesterolemia E78.00    Right carpal tunnel syndrome G56.01    Thoracic back pain M54.6    Lumbar

## 2023-06-07 NOTE — PLAN OF CARE
Problem: Skin/Tissue Integrity  Goal: Absence of new skin breakdown  Description: 1. Monitor for areas of redness and/or skin breakdown  2. Assess vascular access sites hourly  3. Every 4-6 hours minimum:  Change oxygen saturation probe site  4. Every 4-6 hours:  If on nasal continuous positive airway pressure, respiratory therapy assess nares and determine need for appliance change or resting period.   6/6/2023 2106 by Rachelle Stroud RN  Outcome: Progressing  6/6/2023 2101 by Rachelle Stroud RN  Outcome: Progressing     Problem: Safety - Adult  Goal: Free from fall injury  6/6/2023 2106 by Rachelle Stroud RN  Outcome: Progressing  6/6/2023 2101 by Rachelle Stroud RN  Outcome: Progressing

## 2023-06-07 NOTE — DISCHARGE SUMMARY
Finding generally similar in appearance when compared to the study of 05/10/2023. No definite new area of abnormal attenuation of brain parenchyma is identified. No abnormal extra-axial fluid collection is identified. There is atherosclerotic calcification of distal internal carotid and vertebral arteries. ORBITS: The visualized portion of the orbits demonstrate no acute abnormality. Patient is status post bilateral cataract surgery. SINUSES: Minimal-mild mucosal thickening again identified about the paranasal sinuses. Again noted is septal deviation. Mastoid air cells are well aerated. SOFT TISSUES/SKULL:  No acute abnormality of the visualized skull or soft tissues. No evidence of acute intracranial abnormality. CT HEAD WO CONTRAST    Result Date: 5/10/2023  EXAMINATION: CT OF THE HEAD WITHOUT CONTRAST  5/10/2023 8:15 am TECHNIQUE: CT of the head was performed without the administration of intravenous contrast. Automated exposure control, iterative reconstruction, and/or weight based adjustment of the mA/kV was utilized to reduce the radiation dose to as low as reasonably achievable. COMPARISON: None. HISTORY: ORDERING SYSTEM PROVIDED HISTORY: LOC, hit right forehead with abrasion TECHNOLOGIST PROVIDED HISTORY: If patient is on cardiac monitor and/or pulse ox, they may be taken off cardiac monitor and pulse ox, left on O2 if currently on. All monitors reattached when patient returns to room. Has a \"code stroke\" or \"stroke alert\" been called? ->No Reason for exam:->LOC, hit right forehead with abrasion Decision Support Exception - unselect if not a suspected or confirmed emergency medical condition->Emergency Medical Condition (MA) Reason for Exam: pt stated she coughed most of the night, got up this am and passed out in her kitchen, abrasion right forehead Additional signs and symptoms: no prev stroke or seizure FINDINGS: BRAIN/VENTRICLES: Ventricles are midline in position.   No intracerebral masses

## 2023-06-07 NOTE — CARE COORDINATION
Case Management/Follow up:    Chart reviewed for length of stay. Hospital day #  2 Unit:  C3    Diagnosis and current status as per MD progress: COVID-19    Anticipated d/c date: 1-2    Expected plan for discharge: Home with PT    Potential barriers:None    Confirmed plan with patient : Yes    Comments: Patient is independent with ADLs, Recommendations for Home PT to strengthen. Patient lives alone. Family will provide transportation home. Referral made to [facility] [address]. They will follow. Astrid Justice RN

## 2023-06-08 ENCOUNTER — CARE COORDINATION (OUTPATIENT)
Dept: CASE MANAGEMENT | Age: 88
End: 2023-06-08

## 2023-06-08 NOTE — CARE COORDINATION
[facility] Care Transitions Initial Follow Up Call    Call within 2 business days of discharge: Yes      Patient: Luis M Jacobsen Patient : 1935   MRN: [id number]  Reason for Admission: 2 days -> COVID-19 detected , general weakness, diarrhea, CAD, HLD, right flank pain, hypoNa, hypokalemia, hypoMg, dehydration on HCTZ, HTN, GERD with mod to large hiatal hernia -> [facility] [employer] [facility], hold HCTZ   Discharge Date: 23 RARS: Readmission Risk Score: 14.3      Last Discharge [address]       Date Complaint Diagnosis Description Type Department Provider    23 Fatigue COVID-19 . .. ED to Hosp-Admission (Discharged) (ADMITTED) Yamileth Moraes MD; [address] [facility],[address]. ..     1st attempt - CTN attempted follow-up outreach to patient. Message left including CTN contact information. CTN contacted Jenise at [facility] to confirm referral received and accepted. Follow Up  No future appointments.     Florence Kessler, RN  Care Transition Nurse  [phone] mobile

## 2023-06-08 NOTE — CARE COORDINATION
CASE MANAGEMENT DISCHARGE SUMMARY      Discharge to: Home      IMM given: 6/6/2023    Transportation:    Family/car: yes     Confirmed discharge plan with:     Patient: yes        Facility/Agency, name: [facility] will provide home PT/OT; Confirmed with Ana Gabriel at [place]         Note: Discharging nurse to complete reconcile AVS, and place final copy with patient's discharge packet. RN to ensure that written prescriptions for  Level II medications are sent with patient to the facility as per protocol.   Lopez Felipe RN

## 2023-06-09 ENCOUNTER — CARE COORDINATION (OUTPATIENT)
Dept: CASE MANAGEMENT | Age: 88
End: 2023-06-09

## 2023-06-09 NOTE — CARE COORDINATION
[facility] Care Transitions Initial Follow Up Call    Call within 2 business days of discharge: Yes      Patient: Sharan Dietrich Patient : 1935   MRN: [id number]  Reason for Admission: 2 days -> COVID-19 detected , general weakness, diarrhea, CAD, HLD, right flank pain, hypoNa, hypokalemia, hypoMg, dehydration on HCTZ, HTN, GERD with mod to large hiatal hernia -> [facility]   Discharge Date: 23 RARS: Readmission Risk Score: 14.3      Last Discharge [address]       Date Complaint Diagnosis Description Type Department Provider    23 Fatigue COVID-19 . .. ED to Hosp-Admission (Discharged) (ADMITTED) Karolina Sweet MD; [address] [facility],[address]. .. 2nd attempt - CTN attempted follow-up outreach to patient. Message left including CTN contact information. No further CTN outreach scheduled at this time. Episode resolved. Follow Up  No future appointments.     Leena Natarajan RN  Care Transition Nurse  [phone] mobile

## 2023-06-25 ENCOUNTER — APPOINTMENT (OUTPATIENT)
Dept: CT IMAGING | Age: 88
End: 2023-06-25
Payer: MEDICARE

## 2023-06-25 ENCOUNTER — APPOINTMENT (OUTPATIENT)
Dept: GENERAL RADIOLOGY | Age: 88
End: 2023-06-25
Payer: MEDICARE

## 2023-06-25 ENCOUNTER — HOSPITAL ENCOUNTER (EMERGENCY)
Age: 88
Discharge: HOME | End: 2023-06-25
Payer: MEDICARE

## 2023-06-25 VITALS
WEIGHT: 149 LBS | HEART RATE: 84 BPM | TEMPERATURE: 98.4 F | SYSTOLIC BLOOD PRESSURE: 158 MMHG | HEIGHT: 65 IN | OXYGEN SATURATION: 99 % | DIASTOLIC BLOOD PRESSURE: 79 MMHG | RESPIRATION RATE: 15 BRPM | BODY MASS INDEX: 24.83 KG/M2

## 2023-06-25 DIAGNOSIS — T50.905A ADVERSE EFFECT OF DRUG, INITIAL ENCOUNTER: ICD-10-CM

## 2023-06-25 DIAGNOSIS — R11.0 NAUSEA: Primary | ICD-10-CM

## 2023-06-25 LAB
ALBUMIN SERPL-MCNC: 4.4 G/DL (ref 3.4–5)
ALBUMIN/GLOB SERPL: 1.6 {RATIO} (ref 1.1–2.2)
ALP SERPL-CCNC: 83 U/L (ref 40–129)
ALT SERPL-CCNC: 12 U/L (ref 10–40)
ANION GAP SERPL CALCULATED.3IONS-SCNC: 14 MMOL/L (ref 3–16)
AST SERPL-CCNC: 18 U/L (ref 15–37)
BASOPHILS # BLD: 0.1 K/UL (ref 0–0.2)
BASOPHILS NFR BLD: 0.5 %
BILIRUB SERPL-MCNC: 0.6 MG/DL (ref 0–1)
BILIRUB UR QL STRIP.AUTO: NEGATIVE
BUN SERPL-MCNC: 10 MG/DL (ref 7–20)
CALCIUM SERPL-MCNC: 9.9 MG/DL (ref 8.3–10.6)
CHLORIDE SERPL-SCNC: 92 MMOL/L (ref 99–110)
CLARITY UR: CLEAR
CO2 SERPL-SCNC: 25 MMOL/L (ref 21–32)
COLOR UR: YELLOW
CREAT SERPL-MCNC: 1.1 MG/DL (ref 0.6–1.2)
DEPRECATED RDW RBC AUTO: 13.5 % (ref 12.4–15.4)
EOSINOPHIL # BLD: 0 K/UL (ref 0–0.6)
EOSINOPHIL NFR BLD: 0.2 %
GFR SERPLBLD CREATININE-BSD FMLA CKD-EPI: 49 ML/MIN/{1.73_M2}
GLUCOSE SERPL-MCNC: 118 MG/DL (ref 70–99)
GLUCOSE UR STRIP.AUTO-MCNC: NEGATIVE MG/DL
HCT VFR BLD AUTO: 44.6 % (ref 36–48)
HGB BLD-MCNC: 15.4 G/DL (ref 12–16)
HGB UR QL STRIP.AUTO: NEGATIVE
KETONES UR STRIP.AUTO-MCNC: NEGATIVE MG/DL
LACTATE BLDV-SCNC: 1.2 MMOL/L (ref 0.4–1.9)
LEUKOCYTE ESTERASE UR QL STRIP.AUTO: NEGATIVE
LIPASE SERPL-CCNC: 34 U/L (ref 13–60)
LYMPHOCYTES # BLD: 5.5 K/UL (ref 1–5.1)
LYMPHOCYTES NFR BLD: 42.8 %
MAGNESIUM SERPL-MCNC: 2.1 MG/DL (ref 1.8–2.4)
MCH RBC QN AUTO: 31.8 PG (ref 26–34)
MCHC RBC AUTO-ENTMCNC: 34.4 G/DL (ref 31–36)
MCV RBC AUTO: 92.4 FL (ref 80–100)
MONOCYTES # BLD: 0.9 K/UL (ref 0–1.3)
MONOCYTES NFR BLD: 7.1 %
NEUTROPHILS # BLD: 6.3 K/UL (ref 1.7–7.7)
NEUTROPHILS NFR BLD: 49.4 %
NITRITE UR QL STRIP.AUTO: NEGATIVE
PATH INTERP BLD-IMP: NO
PH UR STRIP.AUTO: 7.5 [PH] (ref 5–8)
PLATELET # BLD AUTO: 328 K/UL (ref 135–450)
PMV BLD AUTO: 7.3 FL (ref 5–10.5)
POTASSIUM SERPL-SCNC: 3.5 MMOL/L (ref 3.5–5.1)
PROT SERPL-MCNC: 7.2 G/DL (ref 6.4–8.2)
PROT UR STRIP.AUTO-MCNC: NEGATIVE MG/DL
RBC # BLD AUTO: 4.83 M/UL (ref 4–5.2)
SODIUM SERPL-SCNC: 131 MMOL/L (ref 136–145)
SP GR UR STRIP.AUTO: 1.01 (ref 1–1.03)
TROPONIN, HIGH SENSITIVITY: 17 NG/L (ref 0–14)
TROPONIN, HIGH SENSITIVITY: 21 NG/L (ref 0–14)
UA COMPLETE W REFLEX CULTURE PNL UR: NORMAL
UA DIPSTICK W REFLEX MICRO PNL UR: NORMAL
URN SPEC COLLECT METH UR: NORMAL
UROBILINOGEN UR STRIP-ACNC: 0.2 E.U./DL
WBC # BLD AUTO: 12.9 K/UL (ref 4–11)

## 2023-06-25 PROCEDURE — 99285 EMERGENCY DEPT VISIT HI MDM: CPT

## 2023-06-25 PROCEDURE — 81003 URINALYSIS AUTO W/O SCOPE: CPT

## 2023-06-25 PROCEDURE — 83735 ASSAY OF MAGNESIUM: CPT

## 2023-06-25 PROCEDURE — 83690 ASSAY OF LIPASE: CPT

## 2023-06-25 PROCEDURE — 84484 ASSAY OF TROPONIN QUANT: CPT

## 2023-06-25 PROCEDURE — 85025 COMPLETE CBC W/AUTO DIFF WBC: CPT

## 2023-06-25 PROCEDURE — 83605 ASSAY OF LACTIC ACID: CPT

## 2023-06-25 PROCEDURE — 71046 X-RAY EXAM CHEST 2 VIEWS: CPT

## 2023-06-25 PROCEDURE — 2580000003 HC RX 258: Performed by: REGISTERED NURSE

## 2023-06-25 PROCEDURE — 80053 COMPREHEN METABOLIC PANEL: CPT

## 2023-06-25 PROCEDURE — 6360000004 HC RX CONTRAST MEDICATION: Performed by: REGISTERED NURSE

## 2023-06-25 PROCEDURE — 96374 THER/PROPH/DIAG INJ IV PUSH: CPT

## 2023-06-25 PROCEDURE — 74177 CT ABD & PELVIS W/CONTRAST: CPT

## 2023-06-25 PROCEDURE — 6360000002 HC RX W HCPCS: Performed by: REGISTERED NURSE

## 2023-06-25 PROCEDURE — 93005 ELECTROCARDIOGRAM TRACING: CPT | Performed by: REGISTERED NURSE

## 2023-06-25 RX ORDER — ONDANSETRON 2 MG/ML
4 INJECTION INTRAMUSCULAR; INTRAVENOUS ONCE
Status: COMPLETED | OUTPATIENT
Start: 2023-06-25 | End: 2023-06-25

## 2023-06-25 RX ORDER — 0.9 % SODIUM CHLORIDE 0.9 %
1000 INTRAVENOUS SOLUTION INTRAVENOUS ONCE
Status: COMPLETED | OUTPATIENT
Start: 2023-06-25 | End: 2023-06-25

## 2023-06-25 RX ORDER — ESCITALOPRAM OXALATE 5 MG/1
5 TABLET ORAL EVERY EVENING
COMMUNITY
Start: 2023-06-21

## 2023-06-25 RX ADMIN — SODIUM CHLORIDE 1000 ML: 9 INJECTION, SOLUTION INTRAVENOUS at 18:41

## 2023-06-25 RX ADMIN — ONDANSETRON 4 MG: 2 INJECTION INTRAMUSCULAR; INTRAVENOUS at 18:42

## 2023-06-25 RX ADMIN — IOPAMIDOL 75 ML: 755 INJECTION, SOLUTION INTRAVENOUS at 19:37

## 2023-06-25 ASSESSMENT — ENCOUNTER SYMPTOMS
SHORTNESS OF BREATH: 0
VOMITING: 0
CONSTIPATION: 0
CHEST TIGHTNESS: 0
BACK PAIN: 0
RHINORRHEA: 0
COUGH: 0
ABDOMINAL PAIN: 1
NAUSEA: 1
SORE THROAT: 0
DIARRHEA: 1
BLOOD IN STOOL: 0

## 2023-06-25 ASSESSMENT — PAIN SCALES - GENERAL: PAINLEVEL_OUTOF10: 4

## 2023-06-25 ASSESSMENT — PAIN DESCRIPTION - LOCATION: LOCATION: BACK

## 2023-06-25 ASSESSMENT — PAIN DESCRIPTION - PAIN TYPE: TYPE: CHRONIC PAIN

## 2023-06-25 ASSESSMENT — PAIN - FUNCTIONAL ASSESSMENT: PAIN_FUNCTIONAL_ASSESSMENT: 0-10

## 2023-06-25 ASSESSMENT — PAIN DESCRIPTION - FREQUENCY: FREQUENCY: INTERMITTENT

## 2023-06-25 ASSESSMENT — PAIN DESCRIPTION - DESCRIPTORS: DESCRIPTORS: ACHING

## 2023-06-26 LAB
EKG ATRIAL RATE: 80 BPM
EKG DIAGNOSIS: NORMAL
EKG P AXIS: 29 DEGREES
EKG P-R INTERVAL: 148 MS
EKG Q-T INTERVAL: 412 MS
EKG QRS DURATION: 84 MS
EKG QTC CALCULATION (BAZETT): 475 MS
EKG R AXIS: -43 DEGREES
EKG T AXIS: 43 DEGREES
EKG VENTRICULAR RATE: 80 BPM

## 2023-06-26 PROCEDURE — 93010 ELECTROCARDIOGRAM REPORT: CPT | Performed by: INTERNAL MEDICINE

## 2023-07-25 NOTE — H&P
Nursing History and Physical reviewed and agreed upon. Additional findings:    Allergies and medications have been reviewed. HENT: Airway patent and reviewed  Cardiovascular: Normal rate, regular rhythm, normal heart sounds. Pulmonary/Chest: No wheezes. No rhonchi. No rales. Abdominal: Soft. Bowel sounds are normal. No distension. Mallampati: 2    ASA CLASS:         []   I. Normal, healthy adult           [x]   II.  Mild systemic disease            []   III. Severe systemic disease      Sedation plan:   [x]  Local/MINIMAL     []  Minimal    MALLAMPATI:           []   I. Complete visualization of the soft palate           [x]   II. Complete visualization of the uvula            []   III. Visualization of only the base of the uvula           []   IV. Soft palate is not visibl    Patient's condition acceptable for planned procedure/sedation. Post Procedure Plan   Return to same level of care   ______________________     The risks and benefits as well as alternatives to the procedure have been discussed with the patient and or family. The patient and or next of kin understands and agrees to proceed.     @me NEGATIVE

## 2024-02-06 ENCOUNTER — APPOINTMENT (OUTPATIENT)
Dept: MRI IMAGING | Age: 89
DRG: 312 | End: 2024-02-06
Payer: MEDICARE

## 2024-02-06 ENCOUNTER — APPOINTMENT (OUTPATIENT)
Dept: GENERAL RADIOLOGY | Age: 89
DRG: 312 | End: 2024-02-06
Payer: MEDICARE

## 2024-02-06 ENCOUNTER — HOSPITAL ENCOUNTER (INPATIENT)
Age: 89
LOS: 3 days | Discharge: HOME | DRG: 312 | End: 2024-02-10
Attending: EMERGENCY MEDICINE | Admitting: INTERNAL MEDICINE
Payer: MEDICARE

## 2024-02-06 ENCOUNTER — APPOINTMENT (OUTPATIENT)
Dept: CT IMAGING | Age: 89
DRG: 312 | End: 2024-02-06
Payer: MEDICARE

## 2024-02-06 DIAGNOSIS — R55 SYNCOPE AND COLLAPSE: Primary | ICD-10-CM

## 2024-02-06 LAB
ALBUMIN SERPL-MCNC: 4 G/DL (ref 3.4–5)
ALBUMIN/GLOB SERPL: 1.3 {RATIO} (ref 1.1–2.2)
ALP SERPL-CCNC: 100 U/L (ref 40–129)
ALT SERPL-CCNC: 11 U/L (ref 10–40)
ANION GAP SERPL CALCULATED.3IONS-SCNC: 10 MMOL/L (ref 3–16)
AST SERPL-CCNC: 17 U/L (ref 15–37)
BASOPHILS # BLD: 0.1 K/UL (ref 0–0.2)
BASOPHILS NFR BLD: 0.7 %
BILIRUB SERPL-MCNC: 0.3 MG/DL (ref 0–1)
BILIRUB UR QL STRIP.AUTO: NEGATIVE
BUN SERPL-MCNC: 19 MG/DL (ref 7–20)
CALCIUM SERPL-MCNC: 9.6 MG/DL (ref 8.3–10.6)
CHLORIDE SERPL-SCNC: 101 MMOL/L (ref 99–110)
CLARITY UR: CLEAR
CO2 SERPL-SCNC: 30 MMOL/L (ref 21–32)
COLOR UR: YELLOW
CREAT SERPL-MCNC: 1.1 MG/DL (ref 0.6–1.2)
DEPRECATED RDW RBC AUTO: 13.8 % (ref 12.4–15.4)
EKG ATRIAL RATE: 79 BPM
EKG DIAGNOSIS: NORMAL
EKG P AXIS: 21 DEGREES
EKG P-R INTERVAL: 186 MS
EKG Q-T INTERVAL: 388 MS
EKG QRS DURATION: 84 MS
EKG QTC CALCULATION (BAZETT): 444 MS
EKG R AXIS: -38 DEGREES
EKG T AXIS: 6 DEGREES
EKG VENTRICULAR RATE: 79 BPM
EOSINOPHIL # BLD: 0.6 K/UL (ref 0–0.6)
EOSINOPHIL NFR BLD: 5.1 %
FLUAV RNA RESP QL NAA+PROBE: NOT DETECTED
FLUBV RNA RESP QL NAA+PROBE: NOT DETECTED
GFR SERPLBLD CREATININE-BSD FMLA CKD-EPI: 48 ML/MIN/{1.73_M2}
GLUCOSE SERPL-MCNC: 116 MG/DL (ref 70–99)
GLUCOSE UR STRIP.AUTO-MCNC: NEGATIVE MG/DL
HCT VFR BLD AUTO: 45.9 % (ref 36–48)
HGB BLD-MCNC: 14.9 G/DL (ref 12–16)
HGB UR QL STRIP.AUTO: NEGATIVE
KETONES UR STRIP.AUTO-MCNC: NEGATIVE MG/DL
LEUKOCYTE ESTERASE UR QL STRIP.AUTO: NEGATIVE
LYMPHOCYTES # BLD: 4 K/UL (ref 1–5.1)
LYMPHOCYTES NFR BLD: 32.9 %
MCH RBC QN AUTO: 31.5 PG (ref 26–34)
MCHC RBC AUTO-ENTMCNC: 32.5 G/DL (ref 31–36)
MCV RBC AUTO: 97.1 FL (ref 80–100)
MONOCYTES # BLD: 0.9 K/UL (ref 0–1.3)
MONOCYTES NFR BLD: 7.6 %
NEUTROPHILS # BLD: 6.6 K/UL (ref 1.7–7.7)
NEUTROPHILS NFR BLD: 53.7 %
NITRITE UR QL STRIP.AUTO: NEGATIVE
NT-PROBNP SERPL-MCNC: 312 PG/ML (ref 0–449)
NT-PROBNP SERPL-MCNC: 559 PG/ML (ref 0–449)
PH UR STRIP.AUTO: 7.5 [PH] (ref 5–8)
PLATELET # BLD AUTO: 327 K/UL (ref 135–450)
PMV BLD AUTO: 7.6 FL (ref 5–10.5)
POTASSIUM SERPL-SCNC: 4.3 MMOL/L (ref 3.5–5.1)
PROT SERPL-MCNC: 7 G/DL (ref 6.4–8.2)
PROT UR STRIP.AUTO-MCNC: NEGATIVE MG/DL
RBC # BLD AUTO: 4.72 M/UL (ref 4–5.2)
SARS-COV-2 RNA RESP QL NAA+PROBE: NOT DETECTED
SODIUM SERPL-SCNC: 141 MMOL/L (ref 136–145)
SP GR UR STRIP.AUTO: 1.02 (ref 1–1.03)
TROPONIN, HIGH SENSITIVITY: 25 NG/L (ref 0–14)
TROPONIN, HIGH SENSITIVITY: 27 NG/L (ref 0–14)
TROPONIN, HIGH SENSITIVITY: 32 NG/L (ref 0–14)
TROPONIN, HIGH SENSITIVITY: 42 NG/L (ref 0–14)
UA COMPLETE W REFLEX CULTURE PNL UR: NORMAL
UA DIPSTICK W REFLEX MICRO PNL UR: NORMAL
URN SPEC COLLECT METH UR: NORMAL
UROBILINOGEN UR STRIP-ACNC: 0.2 E.U./DL
WBC # BLD AUTO: 12.3 K/UL (ref 4–11)

## 2024-02-06 PROCEDURE — 93010 ELECTROCARDIOGRAM REPORT: CPT | Performed by: INTERNAL MEDICINE

## 2024-02-06 PROCEDURE — G0378 HOSPITAL OBSERVATION PER HR: HCPCS

## 2024-02-06 PROCEDURE — 83880 ASSAY OF NATRIURETIC PEPTIDE: CPT

## 2024-02-06 PROCEDURE — 99285 EMERGENCY DEPT VISIT HI MDM: CPT

## 2024-02-06 PROCEDURE — 80053 COMPREHEN METABOLIC PANEL: CPT

## 2024-02-06 PROCEDURE — 97535 SELF CARE MNGMENT TRAINING: CPT

## 2024-02-06 PROCEDURE — 81003 URINALYSIS AUTO W/O SCOPE: CPT

## 2024-02-06 PROCEDURE — 6370000000 HC RX 637 (ALT 250 FOR IP): Performed by: NURSE PRACTITIONER

## 2024-02-06 PROCEDURE — 96372 THER/PROPH/DIAG INJ SC/IM: CPT

## 2024-02-06 PROCEDURE — 36415 COLL VENOUS BLD VENIPUNCTURE: CPT

## 2024-02-06 PROCEDURE — 85025 COMPLETE CBC W/AUTO DIFF WBC: CPT

## 2024-02-06 PROCEDURE — 6360000002 HC RX W HCPCS: Performed by: NURSE PRACTITIONER

## 2024-02-06 PROCEDURE — 70551 MRI BRAIN STEM W/O DYE: CPT

## 2024-02-06 PROCEDURE — 2580000003 HC RX 258: Performed by: NURSE PRACTITIONER

## 2024-02-06 PROCEDURE — 70450 CT HEAD/BRAIN W/O DYE: CPT

## 2024-02-06 PROCEDURE — 93005 ELECTROCARDIOGRAM TRACING: CPT | Performed by: EMERGENCY MEDICINE

## 2024-02-06 PROCEDURE — 84484 ASSAY OF TROPONIN QUANT: CPT

## 2024-02-06 PROCEDURE — 97165 OT EVAL LOW COMPLEX 30 MIN: CPT

## 2024-02-06 PROCEDURE — 71045 X-RAY EXAM CHEST 1 VIEW: CPT

## 2024-02-06 PROCEDURE — 87636 SARSCOV2 & INF A&B AMP PRB: CPT

## 2024-02-06 PROCEDURE — 97530 THERAPEUTIC ACTIVITIES: CPT

## 2024-02-06 RX ORDER — ACETAMINOPHEN 650 MG/1
650 SUPPOSITORY RECTAL EVERY 6 HOURS PRN
Status: DISCONTINUED | OUTPATIENT
Start: 2024-02-06 | End: 2024-02-10 | Stop reason: HOSPADM

## 2024-02-06 RX ORDER — METOPROLOL SUCCINATE 25 MG/1
25 TABLET, EXTENDED RELEASE ORAL DAILY
COMMUNITY

## 2024-02-06 RX ORDER — POLYVINYL ALCOHOL 14 MG/ML
1 SOLUTION/ DROPS OPHTHALMIC PRN
Status: DISCONTINUED | OUTPATIENT
Start: 2024-02-06 | End: 2024-02-10 | Stop reason: HOSPADM

## 2024-02-06 RX ORDER — POTASSIUM CHLORIDE 7.45 MG/ML
10 INJECTION INTRAVENOUS PRN
Status: DISCONTINUED | OUTPATIENT
Start: 2024-02-06 | End: 2024-02-10 | Stop reason: HOSPADM

## 2024-02-06 RX ORDER — AMLODIPINE BESYLATE 5 MG/1
5 TABLET ORAL DAILY
Status: DISCONTINUED | OUTPATIENT
Start: 2024-02-07 | End: 2024-02-10 | Stop reason: HOSPADM

## 2024-02-06 RX ORDER — ACETAMINOPHEN 325 MG/1
650 TABLET ORAL EVERY 6 HOURS PRN
Status: DISCONTINUED | OUTPATIENT
Start: 2024-02-06 | End: 2024-02-10 | Stop reason: HOSPADM

## 2024-02-06 RX ORDER — SODIUM CHLORIDE 0.9 % (FLUSH) 0.9 %
5-40 SYRINGE (ML) INJECTION PRN
Status: DISCONTINUED | OUTPATIENT
Start: 2024-02-06 | End: 2024-02-10 | Stop reason: HOSPADM

## 2024-02-06 RX ORDER — METOPROLOL SUCCINATE 25 MG/1
25 TABLET, EXTENDED RELEASE ORAL DAILY
Status: DISCONTINUED | OUTPATIENT
Start: 2024-02-07 | End: 2024-02-10 | Stop reason: HOSPADM

## 2024-02-06 RX ORDER — POLYETHYLENE GLYCOL 3350 17 G/17G
17 POWDER, FOR SOLUTION ORAL DAILY PRN
Status: DISCONTINUED | OUTPATIENT
Start: 2024-02-06 | End: 2024-02-10 | Stop reason: HOSPADM

## 2024-02-06 RX ORDER — PANTOPRAZOLE SODIUM 40 MG/1
40 TABLET, DELAYED RELEASE ORAL
Status: DISCONTINUED | OUTPATIENT
Start: 2024-02-07 | End: 2024-02-10 | Stop reason: HOSPADM

## 2024-02-06 RX ORDER — POTASSIUM CHLORIDE 20 MEQ/1
40 TABLET, EXTENDED RELEASE ORAL PRN
Status: DISCONTINUED | OUTPATIENT
Start: 2024-02-06 | End: 2024-02-10 | Stop reason: HOSPADM

## 2024-02-06 RX ORDER — MAGNESIUM SULFATE IN WATER 40 MG/ML
2000 INJECTION, SOLUTION INTRAVENOUS PRN
Status: DISCONTINUED | OUTPATIENT
Start: 2024-02-06 | End: 2024-02-10 | Stop reason: HOSPADM

## 2024-02-06 RX ORDER — ALBUTEROL SULFATE 90 UG/1
2 AEROSOL, METERED RESPIRATORY (INHALATION) EVERY 6 HOURS PRN
Status: DISCONTINUED | OUTPATIENT
Start: 2024-02-06 | End: 2024-02-10 | Stop reason: HOSPADM

## 2024-02-06 RX ORDER — CETIRIZINE HYDROCHLORIDE 5 MG/1
5 TABLET ORAL NIGHTLY
Status: DISCONTINUED | OUTPATIENT
Start: 2024-02-07 | End: 2024-02-10 | Stop reason: HOSPADM

## 2024-02-06 RX ORDER — SODIUM CHLORIDE 9 MG/ML
INJECTION, SOLUTION INTRAVENOUS PRN
Status: DISCONTINUED | OUTPATIENT
Start: 2024-02-06 | End: 2024-02-10 | Stop reason: HOSPADM

## 2024-02-06 RX ORDER — ROSUVASTATIN CALCIUM 10 MG/1
20 TABLET, COATED ORAL NIGHTLY
Status: DISCONTINUED | OUTPATIENT
Start: 2024-02-06 | End: 2024-02-10 | Stop reason: HOSPADM

## 2024-02-06 RX ORDER — ONDANSETRON 2 MG/ML
4 INJECTION INTRAMUSCULAR; INTRAVENOUS EVERY 6 HOURS PRN
Status: DISCONTINUED | OUTPATIENT
Start: 2024-02-06 | End: 2024-02-10 | Stop reason: HOSPADM

## 2024-02-06 RX ORDER — ONDANSETRON 4 MG/1
4 TABLET, ORALLY DISINTEGRATING ORAL EVERY 8 HOURS PRN
Status: DISCONTINUED | OUTPATIENT
Start: 2024-02-06 | End: 2024-02-10 | Stop reason: HOSPADM

## 2024-02-06 RX ORDER — METOPROLOL SUCCINATE 25 MG/1
25 TABLET, EXTENDED RELEASE ORAL DAILY
Status: DISCONTINUED | OUTPATIENT
Start: 2024-02-06 | End: 2024-02-06

## 2024-02-06 RX ORDER — ENOXAPARIN SODIUM 100 MG/ML
40 INJECTION SUBCUTANEOUS DAILY
Status: DISCONTINUED | OUTPATIENT
Start: 2024-02-06 | End: 2024-02-10 | Stop reason: HOSPADM

## 2024-02-06 RX ORDER — 0.9 % SODIUM CHLORIDE 0.9 %
500 INTRAVENOUS SOLUTION INTRAVENOUS ONCE
Status: CANCELLED | OUTPATIENT
Start: 2024-02-06 | End: 2024-02-06

## 2024-02-06 RX ORDER — LORAZEPAM 0.5 MG/1
0.5 TABLET ORAL 2 TIMES DAILY PRN
Status: DISCONTINUED | OUTPATIENT
Start: 2024-02-06 | End: 2024-02-10 | Stop reason: HOSPADM

## 2024-02-06 RX ORDER — OMEGA-3S/DHA/EPA/FISH OIL/D3 300MG-1000
400 CAPSULE ORAL DAILY
Status: DISCONTINUED | OUTPATIENT
Start: 2024-02-06 | End: 2024-02-10 | Stop reason: HOSPADM

## 2024-02-06 RX ORDER — SODIUM CHLORIDE 0.9 % (FLUSH) 0.9 %
5-40 SYRINGE (ML) INJECTION EVERY 12 HOURS SCHEDULED
Status: DISCONTINUED | OUTPATIENT
Start: 2024-02-06 | End: 2024-02-10 | Stop reason: HOSPADM

## 2024-02-06 RX ADMIN — ENOXAPARIN SODIUM 40 MG: 100 INJECTION SUBCUTANEOUS at 15:10

## 2024-02-06 RX ADMIN — CHOLECALCIFEROL (VITAMIN D3) 10 MCG (400 UNIT) TABLET 400 UNITS: at 20:34

## 2024-02-06 RX ADMIN — ROSUVASTATIN 20 MG: 10 TABLET, FILM COATED ORAL at 20:34

## 2024-02-06 RX ADMIN — Medication 10 ML: at 20:42

## 2024-02-06 ASSESSMENT — PAIN - FUNCTIONAL ASSESSMENT: PAIN_FUNCTIONAL_ASSESSMENT: 0-10

## 2024-02-06 ASSESSMENT — PAIN SCALES - GENERAL
PAINLEVEL_OUTOF10: 0
PAINLEVEL_OUTOF10: 0

## 2024-02-06 NOTE — PROGRESS NOTES
Occupational Therapy  Facility/Department: [facility] C3 TELE/MED SURG/ONC  Occupational Therapy Initial Assessment, Treatment, and Discharge    Name: Tomasa Johnson  : 1935  MRN: [id number]  Date of Service: 2024    Discharge Recommendations:  Home with assist PRN  OT Equipment Recommendations  Equipment Needed: No       Patient Diagnosis(es): The encounter diagnosis was Syncope and collapse.  Past Medical History:  has a past medical history of Back pain, GERD (gastroesophageal reflux disease), Hyperlipidemia, Hypertension, and Peripheral neuropathy.  Past Surgical History:  has a past surgical history that includes Hysterectomy; Cholecystectomy; Cataract removal with implant (Bilateral); Carpal tunnel release (Right, 10/1/2019); Pain management procedure (Right, 2021); and Pain management procedure (Left, 2022).           Assessment    Pt admitted to [facility] with dx of syncope. PTA, pt was IND with all ADLs and mobility no AD. Today pt presents close to baseline, as she was Mod for toileting, SUP for toilet transfer, and SBA for fxl mobility in room and bathroom. No OT services indicated @ this time 2/2 pt being @ baseline. Recommending pt d/c home with PRN assist. Educated pt on benefits of using a shower chair which she already owns.     Prognosis: Good  Decision Making: Low Complexity  No Skilled OT: At baseline function  Activity Tolerance  Activity Tolerance: Patient Tolerated treatment well;Patient limited by fatigue        Plan   Occupational Therapy Plan  Times Per Week: eval only  Current Treatment Recommendations: Strengthening, Functional mobility training, Endurance training, Self-Care / ADL     Restrictions  Restrictions/Precautions  Restrictions/Precautions: Up as Tolerated  Position Activity Restriction  Other position/activity restrictions: up with assist    Subjective   pt denies pain    Social/Functional History  Social/Functional History  Lives With: Alone  Type of Home:

## 2024-02-06 NOTE — PROGRESS NOTES
02/06/24 1710   RT Protocol   History Pulmonary Disease 0   Respiratory pattern 0   Breath sounds 0   Cough 0   Indications for Bronchodilator Therapy None   Bronchodilator Assessment Score 0

## 2024-02-06 NOTE — ED PROVIDER NOTES
[facility]  ED     EMERGENCY DEPARTMENT ENCOUNTER     Location: [facility]  ED  2/6/2024  Note Started: 11:09 AM EST 2/6/24      Patient Identification  Tomasa Johnson is a 88 y.o. female      HPI:Tomasa Johnson was evaluated in the Emergency Department for fall with dizziness.  Patient states that over the last 3 days she has had recurrent episodes of lightheadedness with syncope.  Patient reports similar in the past and reports that she was previously treated for hyponatremia.  Patient denies any head injury or extremity complaints.. Although initial history and physical exam information was obtained by ED/NPP/MD/DO (who also dictated a record of this visit), I personally saw the patient and performed and made/approved the management plan and take responsibility for the patient management.      PHYSICAL EXAM:  General: No acute distress.  Head: Normocephalic/atraumatic.  Heart: Regular rate and rhythm.  Normal S1-S2.  Lungs: Clear to auscultation bilaterally.  No wheezes, rales, rhonchi.  Abdomen: Soft.  Nontender.  Nondistended.  No rebound, or guarding.  Neurologic: Muscle strength 5/5.  Sensation within normal limits.    EKG Interpretation  Sinus rhythm with occasional PVC.  Rate of 79.  Left axis deviation.  No significant ST or T wave changes noted.  No significant change when compared to previous EKG on 6/25/2023.      Patient seen and evaluated.  Relevant records reviewed.  MDM    Patient presents to the emergency department after syncopal episodes x 3.  On exam, patient is moderately hypertensive with otherwise stable vital signs.  On exam, she is alert and oriented and in no acute distress.  Heart, lung, and abdominal exam appears stable.  Patient appears to be neurologically intact.    I independently interpreted the following studies: EKG as noted above.    Patient will be admitted to hospitalist for further evaluation and treatment for recurrent syncopal

## 2024-02-06 NOTE — H&P
Hospital Medicine History & Physical      Date of Admission: 2/6/2024    Date of Service:  Pt seen/examined on 2/6/24     []Admitted to Inpatient with expected LOS greater than two midnights due to medical therapy.  [x]Placed in Observation status.    Chief Admission Complaint:  syncopal episdoe    Presenting Admission History:      88 y.o. female with a PMH of HTN, CAD, HLD, Anxiety, RLS, GERD, and Peripheral Neuropathy who presented to [facility] with a complaint of syncope. Patient reports a sudden feeling of dizziness this morning, such that she attempted to sit down and believes she briefly passed out in a chair. She denies hitting her head. No extremity weakness, facial droop, or speech disturbance.  She reports feeling congested the last couple of days.     Chart review shows: patient was evaluated at  7/12/23:Neuro consult: EEG w severe left hemispheric dysfunction, MRI Head w/o left hemisphere lesion, hyperacute infarction unlikely, probably seizure in left hemisphere (2/2 acute hyponatremia or bupropion use).    In ED, WBC 12.3, afebrile, glucose 116, , Covid/Flu neg, CT head no acute process    Assessment/Plan:      Current Principal Problem:  Syncope, unspecified syncope type    Syncopal episode/Dizziness-unclear etiology  -Check Ortho VS  -Tele  -Neurology consulted  -MRI brain ordered/Echo  -CT head no acute abnormality  -Evaluated at  7/12/23:Neuro consult: EEG w severe left hemispheric dysfunction, MRI Head w/o left hemisphere lesion, hyperacute infarction unlikely, probably seizure in left hemisphere (2/2 acute hyponatremia or bupropion use).    CAD  -Nonobstructive atherosclerosis of coronary artery/SOB   -nonobstructive CAD on angiograms in 2006 and 2012  -normal DSE most recently 11/22/2022  -Trop 42-25, (chronically elevated)     HLD  -continue statin    HTN-uncontrolled  -on Amlodipine/BB    Anxiety/ RLS/Chronic fatigue-on ativan prn at home     GERD w moderate to large hiatal  and interpreted for clinical significance.   Recent Labs     02/06/24  0834   WBC 12.3*   HGB 14.9   HCT 45.9        Recent Labs     02/06/24  0834      K 4.3      CO2 30   BUN 19   CREATININE 1.1   CALCIUM 9.6     Recent Labs     02/06/24  0834 02/06/24  1028   PROBNP 312  --    TROPHS 42* 25*     No results for input(s): \"LABA1C\" in the last 72 hours.  Recent Labs     02/06/24  0834   AST 17   ALT 11   BILITOT 0.3   ALKPHOS 100     No results for input(s): \"INR\", \"LACTA\", \"TSH\" in the last 72 hours.     Marco Kaufman, APRN - CNP

## 2024-02-06 NOTE — PLAN OF CARE
Problem: Safety - Adult  Goal: Free from fall injury  Outcome: Progressing  Flowsheets (Taken 2/6/2024 2594)  Free From Fall Injury: Instruct family/caregiver on patient safety

## 2024-02-06 NOTE — RT PROTOCOL NOTE
RT Inhaler-Nebulizer Bronchodilator Protocol Note    There is a bronchodilator order in the chart from a provider indicating to follow the RT Bronchodilator Protocol and there is an “Initiate RT Inhaler-Nebulizer Bronchodilator Protocol” order as well (see protocol at bottom of note).    CXR Findings:  XR CHEST PORTABLE    Result Date: 2/6/2024  Mildly increased interstitial and bronchovascular markings.  This likely reflects hypoinflation. Borderline cardiac enlargement. Recommend clinical correlation for early failure and follow-up.       The findings from the last RT Protocol Assessment were as follows:   History Pulmonary Disease: None or smoker <15 pack years  Respiratory Pattern: Regular pattern and RR 12-20 bpm  Breath Sounds: Clear breath sounds  Cough: Strong, spontaneous, non-productive  Indication for Bronchodilator Therapy: None  Bronchodilator Assessment Score: 0    Aerosolized bronchodilator medication orders have been revised according to the RT Inhaler-Nebulizer Bronchodilator Protocol below.    Respiratory Therapist to perform RT Therapy Protocol Assessment initially then follow the protocol.  Repeat RT Therapy Protocol Assessment PRN for score 0-3 or on second treatment, BID, and PRN for scores above 3.    No Indications - adjust the frequency to every 6 hours PRN wheezing or bronchospasm, if no treatments needed after 48 hours then discontinue using Per Protocol order mode.     If indication present, adjust the RT bronchodilator orders based on the Bronchodilator Assessment Score as indicated below.  Use Inhaler orders unless patient has one or more of the following: on home nebulizer, not able to hold breath for 10 seconds, is not alert and oriented, cannot activate and use MDI correctly, or respiratory rate 25 breaths per minute or more, then use the equivalent nebulizer order(s) with same Frequency and PRN reasons based on the score.  If a patient is on this medication at home then do not  decrease Frequency below that used at home.    0-3 - enter or revise RT bronchodilator order(s) to equivalent RT Bronchodilator order with Frequency of every 4 hours PRN for wheezing or increased work of breathing using Per Protocol order mode.        4-6 - enter or revise RT Bronchodilator order(s) to two equivalent RT bronchodilator orders with one order with BID Frequency and one order with Frequency of every 4 hours PRN wheezing or increased work of breathing using Per Protocol order mode.        7-10 - enter or revise RT Bronchodilator order(s) to two equivalent RT bronchodilator orders with one order with TID Frequency and one order with Frequency of every 4 hours PRN wheezing or increased work of breathing using Per Protocol order mode.       11-13 - enter or revise RT Bronchodilator order(s) to one equivalent RT bronchodilator order with QID Frequency and an Albuterol order with Frequency of every 4 hours PRN wheezing or increased work of breathing using Per Protocol order mode.      Greater than 13 - enter or revise RT Bronchodilator order(s) to one equivalent RT bronchodilator order with every 4 hours Frequency and an Albuterol order with Frequency of every 2 hours PRN wheezing or increased work of breathing using Per Protocol order mode.     RT to enter RT Home Evaluation for COPD & MDI Assessment order using Per Protocol order mode.    Electronically signed by Susie Blanco RCP on 2/6/2024 at 5:10 PM

## 2024-02-06 NOTE — ED PROVIDER NOTES
[facility] C3 TELE/MED SURG/ONC  EMERGENCY DEPARTMENT ENCOUNTER        Pt Name: Tomasa Johnson  MRN: [id number]  Birthdate 1935  Date of evaluation: 2/6/2024  Provider: RULA Pryor  PCP: Miguel Ángel Curiel MD  Note Started: 11:40 AM EST        I have seen and evaluated this patient with my supervising physician MD Chris.    CHIEF COMPLAINT       Chief Complaint   Patient presents with    Fall     Patient called 911 today with c/o dizziness for the past three days. States each morning she hasn't been feeling well. Today she reports she fell over her chair. Recently admitted to  for hyponatremia. Pt denies hitting her head when she fell this morning and denies any LOC.     Dizziness     Patient ambulated from cot to stretcher without assistance.        HISTORY OF PRESENT ILLNESS      Chief Complaint: Lightheadedness, fall    Tomasa Johnson is a 88 y.o. female who presents to the emergency room for evaluation of lightheadedness.  Patient describes it as a dizziness, it gets worse when she stands up feels like she going to pass out.  Is been going on for 3 days.  Today she collapsed into a chair.  She knew she was going to pass out, and guided herself into the chair.    She denies chest pain.  No shortness of breath.  She does report some weakness.  No nausea or vomiting or belly pain.  No dysuria.    SCREENINGS   NIH Stroke Scale  NIH Stroke Scale Assessed: Yes  Interval: Baseline  Level of Consciousness (1a): Alert  LOC Questions (1b): Answers both correctly  LOC Commands (1c): Performs both tasks correctly  Best Gaze (2): Normal  Visual (3): No visual loss  Facial Palsy (4): Normal symmetrical movement  Motor Arm, Left (5a): No drift  Motor Arm, Right (5b): No drift  Motor Leg, Left (6a): No drift  Motor Leg, Right (6b): No drift  Limb Ataxia (7): Absent  Sensory (8): Normal  Best Language (9): No aphasia  Dysarthria (10): Normal  Extinction and Inattention (11): No abnormality  Total: 0

## 2024-02-06 NOTE — PROGRESS NOTES
4 Eyes Skin Assessment     The patient is being assess for  Admission    I agree that 2 RN's have performed a thorough Head to Toe Skin Assessment on the patient. ALL assessment sites listed below have been assessed.      Areas assessed by both nurses: SUKUMAR Gandhi & SUKUMAR Fernandez   [x]   Head, Face, and Ears   [x]   Shoulders, Back, and Chest  [x]   Arms, Elbows, and Hands   [x]   Coccyx, Sacrum, and IschIum  [x]   Legs, Feet, and Heels        Does the Patient have Skin Breakdown?           Navjot Prevention initiated: N/A   Wound Care Orders initiated:  N/A      WOC nurse consulted for Pressure Injury (Stage 3,4, Unstageable, DTI, NWPT, and Complex wounds), New and Established Ostomies:  NA      Nurse 1 eSignature: Electronically signed by Oksana Miles RN on 2/6/24 at 5:22 PM EST    **SHARE this note so that the co-signing nurse is able to place an eSignature**    Nurse 2 eSignature: Electronically signed by Rebecca Holland RN on 2/6/24 at 7:00 PM EST

## 2024-02-06 NOTE — PROGRESS NOTES
Physical Therapy  Orders received and chart reviewed. Attempted to see pt at this time for PT evaluation. Pt currently resting in bed reports just getting back from MRI and requesting to hold eval until next date. Educated on role of PT and importance to assess mobility and balance. Pt agreeable to re-attempt in am. Will re-attempt as schedule permits.  Thanks,  Cindy Engle PT, DPT

## 2024-02-06 NOTE — CARE COORDINATION
Case Management Assessment  Initial Evaluation    Date/Time of Evaluation: 2/6/2024 11:59 AM  Assessment Completed by: Sasha Cook    If patient is discharged prior to next notation, then this note serves as note for discharge by case management.    Patient Name: Tomasa Johnson                   YOB: 1935  Diagnosis: Syncope, unspecified syncope type [R55]                   Date / Time: 2/6/2024  8:15 AM    Patient Admission Status: Observation   Readmission Risk (Low < 19, Mod (19-27), High > 27): Readmission Risk Score: 14.3    Current PCP: Miguel Ángel Curiel MD  PCP verified by CM? Yes    Chart Reviewed: Yes      History Provided by: Patient  Patient Orientation: Alert and Oriented    Patient Cognition: Alert    Hospitalization in the last 30 days (Readmission):  No    If yes, Readmission Assessment in  Navigator will be completed.    Advance Directives:      Code Status: Prior   Patient's Primary Decision Maker is: Legal Next of Kin    Primary Decision Maker: Alesia Beltrán - Child - [phone]    Secondary Decision Maker: AlexKyung - Daughter-in-Law - [phone]    Discharge Planning:    Patient lives with: Alone Type of Home: House  Primary Care Giver: Self  Patient Support Systems include: Family Members   Current Financial resources:    Current community resources: None  Current services prior to admission: Durable Medical Equipment            Current DME: Walker            Type of Home Care services:  None    ADLS  Prior functional level: Independent in ADLs/IADLs  Current functional level: Independent in ADLs/IADLs    PT AM-PAC:   /24  OT AM-PAC:   /24    Family can provide assistance at DC:    Would you like Case Management to discuss the discharge plan with any other family members/significant others, and if so, who? Yes  Plans to Return to Present Housing: Yes  Other Identified Issues/Barriers to RETURNING to current housing: Medical complications  Potential Assistance  needed at discharge: N/A            Potential DME:    Patient expects to discharge to: House  Plan for transportation at discharge: Self    Financial    Payor: [employer] MEDICARE / Plan: [employer] CHOICE-PPO MEDICARE / Product Type: *No Product type* /     Does insurance require precert for SNF: Yes    Potential assistance Purchasing Medications: No  Meds-to-Beds request:        [id number] [place], [place] - [address] - P [phone] - F [phone]  [address]  [facility] [address]  Phone: [phone] Fax: [phone]      Notes:    Factors facilitating achievement of predicted outcomes: Family support, Motivated, Cooperative, Pleasant, and Good insight into deficits    Barriers to discharge: Medical complications    Additional Case Management Notes: Referred to pt for d/c planning. Spoke to pt. Grandson present at [facility]. Pt is a 88 year old woman, admitted for fall and dizziness. Pt is from home alone and is independent. Pt uses a walker. Pt has a history of falls, has fallen 4 times due to dizziness. No needs. CM to follow.    The Plan for Transition of Care is related to the following treatment goals of Syncope, unspecified syncope type [R55]    IF APPLICABLE: The Patient and/or patient representative Tomasa and her family were provided with a choice of provider and agrees with the discharge plan. Freedom of choice list with basic dialogue that supports the patient's individualized plan of care/goals and shares the quality data associated with the providers was provided to:     Patient Representative Name:       The Patient and/or Patient Representative Agree with the Discharge Plan?      COOPER Carbajal Student  Case Management Department  Ph: [phone] Fax: [phone]   Electronically signed by DEB Clarke on 2/6/2024 at 12:03 PM

## 2024-02-07 LAB
ANION GAP SERPL CALCULATED.3IONS-SCNC: 8 MMOL/L (ref 3–16)
BASOPHILS # BLD: 0.1 K/UL (ref 0–0.2)
BASOPHILS NFR BLD: 1.1 %
BUN SERPL-MCNC: 21 MG/DL (ref 7–20)
CALCIUM SERPL-MCNC: 9.3 MG/DL (ref 8.3–10.6)
CHLORIDE SERPL-SCNC: 102 MMOL/L (ref 99–110)
CO2 SERPL-SCNC: 30 MMOL/L (ref 21–32)
CREAT SERPL-MCNC: 1.1 MG/DL (ref 0.6–1.2)
DEPRECATED RDW RBC AUTO: 13.8 % (ref 12.4–15.4)
EOSINOPHIL # BLD: 0.6 K/UL (ref 0–0.6)
EOSINOPHIL NFR BLD: 4.8 %
GFR SERPLBLD CREATININE-BSD FMLA CKD-EPI: 48 ML/MIN/{1.73_M2}
GLUCOSE SERPL-MCNC: 108 MG/DL (ref 70–99)
HCT VFR BLD AUTO: 44.9 % (ref 36–48)
HGB BLD-MCNC: 14.7 G/DL (ref 12–16)
LYMPHOCYTES # BLD: 4.8 K/UL (ref 1–5.1)
LYMPHOCYTES NFR BLD: 39.7 %
MCH RBC QN AUTO: 31.7 PG (ref 26–34)
MCHC RBC AUTO-ENTMCNC: 32.8 G/DL (ref 31–36)
MCV RBC AUTO: 96.5 FL (ref 80–100)
MONOCYTES # BLD: 1 K/UL (ref 0–1.3)
MONOCYTES NFR BLD: 8 %
NEUTROPHILS # BLD: 5.6 K/UL (ref 1.7–7.7)
NEUTROPHILS NFR BLD: 46.4 %
PLATELET # BLD AUTO: 327 K/UL (ref 135–450)
PMV BLD AUTO: 7.5 FL (ref 5–10.5)
POTASSIUM SERPL-SCNC: 4.2 MMOL/L (ref 3.5–5.1)
RBC # BLD AUTO: 4.65 M/UL (ref 4–5.2)
SODIUM SERPL-SCNC: 140 MMOL/L (ref 136–145)
WBC # BLD AUTO: 12 K/UL (ref 4–11)

## 2024-02-07 PROCEDURE — 97116 GAIT TRAINING THERAPY: CPT

## 2024-02-07 PROCEDURE — 96360 HYDRATION IV INFUSION INIT: CPT

## 2024-02-07 PROCEDURE — 96372 THER/PROPH/DIAG INJ SC/IM: CPT

## 2024-02-07 PROCEDURE — 85025 COMPLETE CBC W/AUTO DIFF WBC: CPT

## 2024-02-07 PROCEDURE — 96361 HYDRATE IV INFUSION ADD-ON: CPT

## 2024-02-07 PROCEDURE — 93306 TTE W/DOPPLER COMPLETE: CPT

## 2024-02-07 PROCEDURE — 2580000003 HC RX 258: Performed by: NURSE PRACTITIONER

## 2024-02-07 PROCEDURE — 6370000000 HC RX 637 (ALT 250 FOR IP): Performed by: NURSE PRACTITIONER

## 2024-02-07 PROCEDURE — G0378 HOSPITAL OBSERVATION PER HR: HCPCS

## 2024-02-07 PROCEDURE — 6360000002 HC RX W HCPCS: Performed by: NURSE PRACTITIONER

## 2024-02-07 PROCEDURE — 97530 THERAPEUTIC ACTIVITIES: CPT

## 2024-02-07 PROCEDURE — 97161 PT EVAL LOW COMPLEX 20 MIN: CPT

## 2024-02-07 PROCEDURE — 80048 BASIC METABOLIC PNL TOTAL CA: CPT

## 2024-02-07 PROCEDURE — 1200000000 HC SEMI PRIVATE

## 2024-02-07 RX ORDER — FLUTICASONE PROPIONATE 50 MCG
1 SPRAY, SUSPENSION (ML) NASAL DAILY
Status: DISCONTINUED | OUTPATIENT
Start: 2024-02-07 | End: 2024-02-10 | Stop reason: HOSPADM

## 2024-02-07 RX ORDER — SODIUM CHLORIDE 9 MG/ML
INJECTION, SOLUTION INTRAVENOUS CONTINUOUS
Status: DISCONTINUED | OUTPATIENT
Start: 2024-02-07 | End: 2024-02-07

## 2024-02-07 RX ORDER — SODIUM CHLORIDE 9 MG/ML
INJECTION, SOLUTION INTRAVENOUS CONTINUOUS
Status: ACTIVE | OUTPATIENT
Start: 2024-02-07 | End: 2024-02-08

## 2024-02-07 RX ADMIN — Medication 10 ML: at 21:02

## 2024-02-07 RX ADMIN — SODIUM CHLORIDE: 9 INJECTION, SOLUTION INTRAVENOUS at 21:02

## 2024-02-07 RX ADMIN — FLUTICASONE PROPIONATE 1 SPRAY: 50 SPRAY, METERED NASAL at 13:16

## 2024-02-07 RX ADMIN — ACETAMINOPHEN 650 MG: 325 TABLET ORAL at 09:57

## 2024-02-07 RX ADMIN — AMLODIPINE BESYLATE 5 MG: 5 TABLET ORAL at 09:26

## 2024-02-07 RX ADMIN — ACETAMINOPHEN 650 MG: 325 TABLET ORAL at 23:17

## 2024-02-07 RX ADMIN — SODIUM CHLORIDE: 9 INJECTION, SOLUTION INTRAVENOUS at 13:26

## 2024-02-07 RX ADMIN — PANTOPRAZOLE SODIUM 40 MG: 40 TABLET, DELAYED RELEASE ORAL at 09:26

## 2024-02-07 RX ADMIN — METOPROLOL SUCCINATE 25 MG: 25 TABLET, EXTENDED RELEASE ORAL at 09:26

## 2024-02-07 RX ADMIN — ROSUVASTATIN 20 MG: 10 TABLET, FILM COATED ORAL at 21:01

## 2024-02-07 RX ADMIN — CETIRIZINE HYDROCHLORIDE 5 MG: 5 TABLET, FILM COATED ORAL at 21:01

## 2024-02-07 RX ADMIN — CHOLECALCIFEROL (VITAMIN D3) 10 MCG (400 UNIT) TABLET 400 UNITS: at 09:26

## 2024-02-07 RX ADMIN — ENOXAPARIN SODIUM 40 MG: 100 INJECTION SUBCUTANEOUS at 09:26

## 2024-02-07 RX ADMIN — SODIUM CHLORIDE: 9 INJECTION, SOLUTION INTRAVENOUS at 21:30

## 2024-02-07 ASSESSMENT — PAIN SCALES - GENERAL
PAINLEVEL_OUTOF10: 4
PAINLEVEL_OUTOF10: 6

## 2024-02-07 ASSESSMENT — PAIN - FUNCTIONAL ASSESSMENT: PAIN_FUNCTIONAL_ASSESSMENT: ACTIVITIES ARE NOT PREVENTED

## 2024-02-07 ASSESSMENT — PAIN DESCRIPTION - DESCRIPTORS
DESCRIPTORS: ACHING;SORE
DESCRIPTORS: ACHING;DISCOMFORT

## 2024-02-07 ASSESSMENT — PAIN DESCRIPTION - LOCATION
LOCATION: BACK;HIP
LOCATION: BACK

## 2024-02-07 ASSESSMENT — PAIN DESCRIPTION - ORIENTATION
ORIENTATION: MID;LOWER
ORIENTATION: RIGHT

## 2024-02-07 NOTE — CARE COORDINATION
Chart reviewed. Spoke with patient and DIL. Discussed therapy recs for HHC. Currently declining. Stated doesn't feel will need at this time. Aware if changes mind after d/c, can be arranged by PCP. Davey Guzman RN

## 2024-02-07 NOTE — PLAN OF CARE
Problem: Pain  Goal: Verbalizes/displays adequate comfort level or baseline comfort level  Outcome: Progressing  Flowsheets (Taken 2/6/2024 1937)  Verbalizes/displays adequate comfort level or baseline comfort level:   Encourage patient to monitor pain and request assistance   Assess pain using appropriate pain scale   Administer analgesics based on type and severity of pain and evaluate response   Implement non-pharmacological measures as appropriate and evaluate response     Problem: Safety - Adult  Goal: Free from fall injury  2/6/2024 1937 by Mathew Flores RN  Outcome: Progressing     Problem: ABCDS Injury Assessment  Goal: Absence of physical injury  Outcome: Progressing  Flowsheets (Taken 2/6/2024 1937)  Absence of Physical Injury: Implement safety measures based on patient assessment

## 2024-02-07 NOTE — PROGRESS NOTES
Hospital Medicine Progress Note      Date of Admission: 2/6/2024  Hospital Day: 2    Chief Admission Complaint:  syncopal episode     Subjective:  sinus congestion    Presenting Admission History:       88 y.o. female with a PMH of HTN, CAD, HLD, Anxiety, RLS, GERD, and Peripheral Neuropathy who presented to [facility] with a complaint of syncope. Patient reports a sudden feeling of dizziness this morning, such that she attempted to sit down and believes she briefly passed out in a chair. She denies hitting her head. No extremity weakness, facial droop, or speech disturbance.  She reports feeling congested the last couple of days.      Chart review shows: patient was evaluated at  7/12/23:Neuro consult: EEG w severe left hemispheric dysfunction, MRI Head w/o left hemisphere lesion, hyperacute infarction unlikely, probably seizure in left hemisphere (2/2 acute hyponatremia or bupropion use).     In ED, WBC 12.3, afebrile, glucose 116, , Covid/Flu neg, CT head no acute process       Assessment/Plan:      Current Principal Problem:  Syncope, unspecified syncope type    Syncopal episode/Dizziness-possibly due to medications-orthostatsis  -Ortho VS +  -IVF 24hrs  -Tele  -Neurology consulted  -MRI brain neg infarct  -Echo 2/7/24-stable  -CT head no acute abnormality  -Evaluated at  7/12/23:Neuro consult: EEG w severe left hemispheric dysfunction, MRI Head w/o left hemisphere lesion, hyperacute infarction unlikely, probably seizure in left hemisphere (2/2 acute hyponatremia or bupropion use).     CAD  -Nonobstructive atherosclerosis of coronary artery/SOB   -nonobstructive CAD on angiograms in 2006 and 2012  -normal DSE most recently 11/22/2022  -Trop 42-25, (chronically elevated)     HLD  -continue statin     HTN-uncontrolled  -on Amlodipine/BB     Anxiety/ RLS/Chronic fatigue-on ativan prn at home     GERD w moderate to large hiatal hernia-PPI         Physical Exam Performed:      General appearance:  No  Management.  [] Discussed management of the case with:    [] Telemetry personally reviewed and interpreted as documented above    [] Imaging personally reviewed and interpreted, includes:    [x] Data Review (any 3)  [] Collateral history obtained from:    [x] All available Consultant notes from yesterday/today were reviewed  [x] All current labs were reviewed and interpreted for clinical significance   [x] Appropriate follow-up labs were ordered    Medications:  Personally reviewed in detail in conjunction w/ labs as documented for evidence of drug toxicity.     Infusion Medications    sodium chloride 100 mL/hr at 02/07/24 1326    sodium chloride       Scheduled Medications    fluticasone  1 spray Each Nostril Daily    sodium chloride flush  5-40 mL IntraVENous 2 times per day    enoxaparin  40 mg SubCUTAneous Daily    [Held by provider] amLODIPine  5 mg Oral Daily    cetirizine  5 mg Oral Nightly    vitamin D3  400 Units Oral Daily    pantoprazole  40 mg Oral QAM AC    rosuvastatin  20 mg Oral Nightly    metoprolol succinate  25 mg Oral Daily     PRN Meds: sodium chloride flush, sodium chloride, potassium chloride **OR** potassium alternative oral replacement **OR** potassium chloride, magnesium sulfate, ondansetron **OR** ondansetron, polyethylene glycol, acetaminophen **OR** acetaminophen, perflutren lipid microspheres, albuterol sulfate HFA, LORazepam, polyvinyl alcohol     Labs:  Personally reviewed and interpreted for clinical significance.     Recent Labs     02/06/24  0834 02/07/24  0615   WBC 12.3* 12.0*   HGB 14.9 14.7   HCT 45.9 44.9    327     Recent Labs     02/06/24  0834 02/07/24  0615    140   K 4.3 4.2    102   CO2 30 30   BUN 19 21*   CREATININE 1.1 1.1   CALCIUM 9.6 9.3     Recent Labs     02/06/24  0834 02/06/24  1028 02/06/24  1404 02/06/24  1734   PROBNP 312  --   --  559*   TROPHS 42* 25* 32* 27*     No results for input(s): \"LABA1C\" in the last 72 hours.  Recent Labs

## 2024-02-07 NOTE — PROGRESS NOTES
Assessment completed and documented. VSS. A/ox4. Denies pain during assessment. Up with PT/OT this morning. Family at bedside. Bed locked and in lowest position. Bedside table and call light within reach. Denies further needs at this time.

## 2024-02-07 NOTE — CONSULTS
Consult Placed     Who: Dr. Chi  Date: 2/7/2024    Time:07:40AM     Electronically signed by Tiara Frazier on 2/7/2024 at 7:39 AM

## 2024-02-07 NOTE — PLAN OF CARE
Problem: Pain  Goal: Verbalizes/displays adequate comfort level or baseline comfort level  2/7/2024 0932 by Lauren Noland, RN  Outcome: Progressing  2/6/2024 1937 by Mathew Flores, RN  Outcome: Progressing  Flowsheets (Taken 2/6/2024 1937)  Verbalizes/displays adequate comfort level or baseline comfort level:   Encourage patient to monitor pain and request assistance   Assess pain using appropriate pain scale   Administer analgesics based on type and severity of pain and evaluate response   Implement non-pharmacological measures as appropriate and evaluate response     Problem: Safety - Adult  Goal: Free from fall injury  2/7/2024 0932 by Lauren Noland, RN  Outcome: Progressing  2/6/2024 1937 by Mathew Flores, RN  Outcome: Progressing

## 2024-02-08 LAB
ANION GAP SERPL CALCULATED.3IONS-SCNC: 9 MMOL/L (ref 3–16)
BASOPHILS # BLD: 0 K/UL (ref 0–0.2)
BASOPHILS NFR BLD: 0 %
BUN SERPL-MCNC: 17 MG/DL (ref 7–20)
CALCIUM SERPL-MCNC: 8.9 MG/DL (ref 8.3–10.6)
CHLORIDE SERPL-SCNC: 107 MMOL/L (ref 99–110)
CO2 SERPL-SCNC: 24 MMOL/L (ref 21–32)
CREAT SERPL-MCNC: 1 MG/DL (ref 0.6–1.2)
DEPRECATED RDW RBC AUTO: 13.7 % (ref 12.4–15.4)
EOSINOPHIL # BLD: 0.3 K/UL (ref 0–0.6)
EOSINOPHIL NFR BLD: 2 %
GFR SERPLBLD CREATININE-BSD FMLA CKD-EPI: 54 ML/MIN/{1.73_M2}
GLUCOSE SERPL-MCNC: 99 MG/DL (ref 70–99)
HCT VFR BLD AUTO: 44 % (ref 36–48)
HGB BLD-MCNC: 14.6 G/DL (ref 12–16)
LYMPHOCYTES # BLD: 4.9 K/UL (ref 1–5.1)
LYMPHOCYTES NFR BLD: 39 %
MCH RBC QN AUTO: 31.8 PG (ref 26–34)
MCHC RBC AUTO-ENTMCNC: 33.1 G/DL (ref 31–36)
MCV RBC AUTO: 96.2 FL (ref 80–100)
MONOCYTES # BLD: 0.4 K/UL (ref 0–1.3)
MONOCYTES NFR BLD: 3 %
NEUTROPHILS # BLD: 7 K/UL (ref 1.7–7.7)
NEUTROPHILS NFR BLD: 56 %
PLATELET # BLD AUTO: 303 K/UL (ref 135–450)
PLATELET BLD QL SMEAR: ADEQUATE
PMV BLD AUTO: 7.7 FL (ref 5–10.5)
POTASSIUM SERPL-SCNC: 3.9 MMOL/L (ref 3.5–5.1)
RBC # BLD AUTO: 4.58 M/UL (ref 4–5.2)
RBC MORPH BLD: NORMAL
SLIDE REVIEW: ABNORMAL
SODIUM SERPL-SCNC: 140 MMOL/L (ref 136–145)
TSH SERPL DL<=0.005 MIU/L-ACNC: 2.63 UIU/ML (ref 0.27–4.2)
WBC # BLD AUTO: 12.5 K/UL (ref 4–11)

## 2024-02-08 PROCEDURE — 96372 THER/PROPH/DIAG INJ SC/IM: CPT

## 2024-02-08 PROCEDURE — 6360000002 HC RX W HCPCS: Performed by: NURSE PRACTITIONER

## 2024-02-08 PROCEDURE — 6370000000 HC RX 637 (ALT 250 FOR IP): Performed by: NURSE PRACTITIONER

## 2024-02-08 PROCEDURE — 96361 HYDRATE IV INFUSION ADD-ON: CPT

## 2024-02-08 PROCEDURE — 2580000003 HC RX 258: Performed by: NURSE PRACTITIONER

## 2024-02-08 PROCEDURE — 99223 1ST HOSP IP/OBS HIGH 75: CPT | Performed by: PSYCHIATRY & NEUROLOGY

## 2024-02-08 PROCEDURE — 84443 ASSAY THYROID STIM HORMONE: CPT

## 2024-02-08 PROCEDURE — 1200000000 HC SEMI PRIVATE

## 2024-02-08 PROCEDURE — 95816 EEG AWAKE AND DROWSY: CPT

## 2024-02-08 PROCEDURE — 85025 COMPLETE CBC W/AUTO DIFF WBC: CPT

## 2024-02-08 PROCEDURE — 80048 BASIC METABOLIC PNL TOTAL CA: CPT

## 2024-02-08 RX ORDER — LIDOCAINE 4 G/G
1 PATCH TOPICAL DAILY
Status: DISCONTINUED | OUTPATIENT
Start: 2024-02-08 | End: 2024-02-10 | Stop reason: HOSPADM

## 2024-02-08 RX ADMIN — ROSUVASTATIN 20 MG: 10 TABLET, FILM COATED ORAL at 21:22

## 2024-02-08 RX ADMIN — PANTOPRAZOLE SODIUM 40 MG: 40 TABLET, DELAYED RELEASE ORAL at 08:21

## 2024-02-08 RX ADMIN — ENOXAPARIN SODIUM 40 MG: 100 INJECTION SUBCUTANEOUS at 08:21

## 2024-02-08 RX ADMIN — CHOLECALCIFEROL (VITAMIN D3) 10 MCG (400 UNIT) TABLET 400 UNITS: at 08:21

## 2024-02-08 RX ADMIN — Medication 10 ML: at 21:23

## 2024-02-08 RX ADMIN — CETIRIZINE HYDROCHLORIDE 5 MG: 5 TABLET, FILM COATED ORAL at 21:22

## 2024-02-08 RX ADMIN — ACETAMINOPHEN 650 MG: 325 TABLET ORAL at 08:30

## 2024-02-08 RX ADMIN — FLUTICASONE PROPIONATE 1 SPRAY: 50 SPRAY, METERED NASAL at 08:23

## 2024-02-08 RX ADMIN — METOPROLOL SUCCINATE 25 MG: 25 TABLET, EXTENDED RELEASE ORAL at 08:21

## 2024-02-08 ASSESSMENT — PAIN DESCRIPTION - LOCATION: LOCATION: BACK

## 2024-02-08 ASSESSMENT — PAIN - FUNCTIONAL ASSESSMENT: PAIN_FUNCTIONAL_ASSESSMENT: ACTIVITIES ARE NOT PREVENTED

## 2024-02-08 ASSESSMENT — PAIN DESCRIPTION - DESCRIPTORS: DESCRIPTORS: PRESSURE

## 2024-02-08 NOTE — PROCEDURES
INTERPRETATION:  This 25-minute, computer-assisted video EEG recording is normal.  No potentially epileptiform activity was present during the recording.      CLASSIFICATION:  Normal (awake and drowsy).  Sleep - unsuccessful.  EKG channel.    DESCRIPTION:    BACKGROUND:  The awake recording revealed 9 Hz alpha activity over the posterior head region.  Given the extensive study, the patient still did not sleep.  The EEG showed normal V waves during drowsiness.  There was no significant change on the EEG background with photic stimulation.  Hyperventilation was omitted due to old age.    INTERICTAL DISCHARGES: None    CLINICAL EVENTS:  None    The EKG channel was unremarkable.

## 2024-02-08 NOTE — PROGRESS NOTES
Assessment completed and documented. VSS. A/ox4.c/o back pain, tylenol given and requested lidocaine patch. Ambulates x1 assist with no assistive devices. Tolerating diet. EEG completed. Bed locked and in lowest position. Bedside table and call light within reach. Denies further needs at this time.

## 2024-02-08 NOTE — PLAN OF CARE
Problem: Pain  Goal: Verbalizes/displays adequate comfort level or baseline comfort level  2/7/2024 1948 by Mathew Flores, RN  Outcome: Progressing  Flowsheets (Taken 2/7/2024 1948)  Verbalizes/displays adequate comfort level or baseline comfort level:   Encourage patient to monitor pain and request assistance   Assess pain using appropriate pain scale   Administer analgesics based on type and severity of pain and evaluate response   Implement non-pharmacological measures as appropriate and evaluate response     Problem: Safety - Adult  Goal: Free from fall injury  2/7/2024 1948 by Mathew Flores, RN  Outcome: Progressing     Problem: ABCDS Injury Assessment  Goal: Absence of physical injury  Outcome: Progressing  Flowsheets (Taken 2/7/2024 1948)  Absence of Physical Injury: Implement safety measures based on patient assessment

## 2024-02-08 NOTE — CONSULTS
Neurology consultation note    Patient name: Tomasa Johnson      Chief Complaint:  Recurrent spell with loss of consciousness.    History of present illness:  This is an 88 years on left-handed female.  The patient was brought to the hospital yesterday due to another episode of loss of consciousness.  The patient reported lightheadedness before loss of consciousness.  The patient cannot remember the duration of loss of consciousness.  The patient has similar events back in July last year.  The patient cannot remember the details of the events.  The patient had at least 3 episodes in July.  At that time, the MRI brain showed nonspecific subcortical white matter changes.  The patient had EEG monitoring which showed focal slowing over the left temporal area.  The patient had MRI brain and upon admission.  It showed no acute ischemic injury.  EKG showed normal sinus rhythm.  Echocardiogram showed normal EF.    Past medical history:    Past Medical History:   Diagnosis Date    Back pain     GERD (gastroesophageal reflux disease)     Hyperlipidemia     Hypertension     Peripheral neuropathy        Past surgical history:    Past Surgical History:   Procedure Laterality Date    CARPAL TUNNEL RELEASE Right 10/1/2019    RIGHT OPEN CARPAL TUNNEL RELEASE performed by Jared Joyner MD at [facility] OR    CATARACT REMOVAL WITH IMPLANT Bilateral     CHOLECYSTECTOMY      HYSTERECTOMY (CERVIX STATUS UNKNOWN)      PAIN MANAGEMENT PROCEDURE Right 8/26/2021    RIGHT LUMBAR FIVE SACRAL ONE EPIDURAL STEROID INJECTION SITE CONFIRMED BY FLUOROSCOPY performed by Alex Crouch MD at [facility] OR    PAIN MANAGEMENT PROCEDURE Left 4/21/2022    LEFT LUMBAR FIVE SACRAL ONE EPIDURAL STEROID INJECTION SITE CONFIRMED BY FLUOROSCOPY performed by Alex Crouch MD at [facility] OR        Medication:    Current Facility-Administered Medications   Medication Dose Route Frequency Provider Last Rate Last Admin    lidocaine 4 % external patch 1  °C) (Oral)   Resp 16   Ht 1.651 m (5' 5\")   Wt 70.3 kg (155 lb)   SpO2 97%   BMI 25.79 kg/m²     Neurological examination:  MENTAL STATUS:  Alert and oriented to person.  LANG/SPEECH: No dysarthria.  CRANIAL NERVES: No facial asymmetry.  MOTOR: No pronator drift or leg drift.  REFLEXES: Generalized 2+.  SENSORY: Grossly intact.  COORD: No tremor.    Imaging, procedure, and laboratory data:   I reviewed the MRI brain and previous EEG.    Assessment:    Principal Problem:    Syncope, unspecified syncope type  Resolved Problems:    * No resolved hospital problems. *     The patient has no focal deficit during my assessment.  The MRI brain showed no acute pathology.  The MRI brain also showed no clear epileptogenic lesion.  The previous EEG from  with EEG monitoring showed no clear epileptiform discharges but focal slowing over the left temporal head region.    From neurology perspective, this episode can be epileptic event if there is no other explanation from metabolic or cardiac origins.    Plan:    1.  EEG.  If the EEG showed persistent focal slowing over the left temporal or sharp waves, the patient needs antiepileptic medications.  2.  Seizure precaution.  3.  Ativan 2 mg as needed for breakthrough seizure.  4.  Cardiac telemetry.    The patient can be discharged after EEG.  If the EEG is positive for seizure tendency, the patient can start levetiracetam 500 mg twice daily.  Follow-up with neurology as an outpatient in 1 or 2 months.    75 minutes were spent with the patient with greater than 50% of the time spent in counseling and coordination of care.    Alex Goel MD

## 2024-02-08 NOTE — PROGRESS NOTES
The EEG showed no evidence of seizure tendency or focal slowing.  No indication for AEDs at this time.    Patient may DC home or facility if there is no other concern.

## 2024-02-08 NOTE — CARE COORDINATION
CM update; LOS # 1; Patient is scheduled for EEG today. If negative patient can be discharged and will follow-up with neurology in 1-2 months. Patient has declined need for HHC.Will follow in case this changes. Discharge possibly tomorrow.Jewels Omalley RN

## 2024-02-08 NOTE — PROGRESS NOTES
Hospital Medicine Progress Note      Date of Admission: 2/6/2024  Hospital Day: 3    Chief Admission Complaint:  syncopal episode     Subjective:  feels a little better this am, +back improved with lido patch    Presenting Admission History:       88 y.o. female with a PMH of HTN, CAD, HLD, Anxiety, RLS, GERD, and Peripheral Neuropathy [facility]. Patient reports a sudden feeling of dizziness this morning, such that she attempted to sit down and believes she briefly passed out in a chair. She denies hitting her head. No extremity weakness, facial droop, or speech disturbance.  She reports feeling congested the last couple of days.      Chart review shows: patient was evaluated at  7/12/23:Neuro consult: EEG w severe left hemispheric dysfunction, MRI Head w/o left hemisphere lesion, hyperacute infarction unlikely, probably seizure in left hemisphere (2/2 acute hyponatremia or bupropion use).     In ED, WBC 12.3, afebrile, glucose 116, , Covid/Flu neg, CT head no acute process       Assessment/Plan:      Current Principal Problem:  Syncope, unspecified syncope type    Syncopal episode/Dizziness-possibly due to medications-orthostatsis  -Ortho VS +, IVF x 24hrs  -IVF 24hrs  -Tele  -Neurology consulted-EEG unremarkable, signed off  -MRI brain neg infarct  -Echo 2/7/24-stable  -CT head no acute abnormality  -Evaluated at  7/12/23:Neuro consult: EEG w severe left hemispheric dysfunction, MRI Head w/o left hemisphere lesion, hyperacute infarction unlikely, probably seizure in left hemisphere (2/2 acute hyponatremia or bupropion use).    Leukocytosis-mild, covid/flu neg, UA unremarkable, cxr no infiltrate     CAD  -Nonobstructive atherosclerosis of coronary artery/SOB   -nonobstructive CAD on angiograms in 2006 and 2012  -normal DSE most recently 11/22/2022  -Trop 42-25, (chronically elevated)     HLD  -continue statin     HTN-uncontrolled  -on Amlodipine/BB      ----------------------------------------------------------------------  C. Data (any 2)  [x] Discussed current management and discharge planning options with Case Management.  [] Discussed management of the case with:    [] Telemetry personally reviewed and interpreted as documented above    [] Imaging personally reviewed and interpreted, includes:    [x] Data Review (any 3)  [] Collateral history obtained from:    [x] All available Consultant notes from yesterday/today were reviewed  [x] All current labs were reviewed and interpreted for clinical significance   [x] Appropriate follow-up labs were ordered    Medications:  Personally reviewed in detail in conjunction w/ labs as documented for evidence of drug toxicity.     Infusion Medications    sodium chloride       Scheduled Medications    lidocaine  1 patch TransDERmal Daily    fluticasone  1 spray Each Nostril Daily    sodium chloride flush  5-40 mL IntraVENous 2 times per day    enoxaparin  40 mg SubCUTAneous Daily    [Held by provider] amLODIPine  5 mg Oral Daily    cetirizine  5 mg Oral Nightly    vitamin D3  400 Units Oral Daily    pantoprazole  40 mg Oral QAM AC    rosuvastatin  20 mg Oral Nightly    metoprolol succinate  25 mg Oral Daily     PRN Meds: sodium chloride flush, sodium chloride, potassium chloride **OR** potassium alternative oral replacement **OR** potassium chloride, magnesium sulfate, ondansetron **OR** ondansetron, polyethylene glycol, acetaminophen **OR** acetaminophen, perflutren lipid microspheres, albuterol sulfate HFA, LORazepam, polyvinyl alcohol     Labs:  Personally reviewed and interpreted for clinical significance.     Recent Labs     02/06/24  0834 02/07/24  0615 02/08/24  0515   WBC 12.3* 12.0* 12.5*   HGB 14.9 14.7 14.6   HCT 45.9 44.9 44.0    327 303       Recent Labs     02/06/24  0834 02/07/24  0615 02/08/24  0515    140 140   K 4.3 4.2 3.9    102 107   CO2 30 30 24   BUN 19 21* 17   CREATININE 1.1 1.1

## 2024-02-09 ENCOUNTER — APPOINTMENT (OUTPATIENT)
Dept: CT IMAGING | Age: 89
DRG: 312 | End: 2024-02-09
Payer: MEDICARE

## 2024-02-09 LAB
ANION GAP SERPL CALCULATED.3IONS-SCNC: 10 MMOL/L (ref 3–16)
BUN SERPL-MCNC: 21 MG/DL (ref 7–20)
CALCIUM SERPL-MCNC: 9.2 MG/DL (ref 8.3–10.6)
CHLORIDE SERPL-SCNC: 105 MMOL/L (ref 99–110)
CO2 SERPL-SCNC: 28 MMOL/L (ref 21–32)
CREAT SERPL-MCNC: 1.1 MG/DL (ref 0.6–1.2)
DEPRECATED RDW RBC AUTO: 13.7 % (ref 12.4–15.4)
GFR SERPLBLD CREATININE-BSD FMLA CKD-EPI: 48 ML/MIN/{1.73_M2}
GLUCOSE SERPL-MCNC: 102 MG/DL (ref 70–99)
HCT VFR BLD AUTO: 44.9 % (ref 36–48)
HGB BLD-MCNC: 14.9 G/DL (ref 12–16)
LACTATE BLDV-SCNC: 0.8 MMOL/L (ref 0.4–2)
MCH RBC QN AUTO: 31.9 PG (ref 26–34)
MCHC RBC AUTO-ENTMCNC: 33.1 G/DL (ref 31–36)
MCV RBC AUTO: 96.4 FL (ref 80–100)
PLATELET # BLD AUTO: 309 K/UL (ref 135–450)
PMV BLD AUTO: 7.8 FL (ref 5–10.5)
POTASSIUM SERPL-SCNC: 3.9 MMOL/L (ref 3.5–5.1)
PROCALCITONIN SERPL IA-MCNC: 0.09 NG/ML (ref 0–0.15)
RBC # BLD AUTO: 4.66 M/UL (ref 4–5.2)
SODIUM SERPL-SCNC: 143 MMOL/L (ref 136–145)
WBC # BLD AUTO: 14 K/UL (ref 4–11)

## 2024-02-09 PROCEDURE — 36415 COLL VENOUS BLD VENIPUNCTURE: CPT

## 2024-02-09 PROCEDURE — 85027 COMPLETE CBC AUTOMATED: CPT

## 2024-02-09 PROCEDURE — 6370000000 HC RX 637 (ALT 250 FOR IP): Performed by: NURSE PRACTITIONER

## 2024-02-09 PROCEDURE — 99233 SBSQ HOSP IP/OBS HIGH 50: CPT | Performed by: PSYCHIATRY & NEUROLOGY

## 2024-02-09 PROCEDURE — 96372 THER/PROPH/DIAG INJ SC/IM: CPT

## 2024-02-09 PROCEDURE — 83605 ASSAY OF LACTIC ACID: CPT

## 2024-02-09 PROCEDURE — 2580000003 HC RX 258: Performed by: NURSE PRACTITIONER

## 2024-02-09 PROCEDURE — 6360000002 HC RX W HCPCS: Performed by: NURSE PRACTITIONER

## 2024-02-09 PROCEDURE — 80048 BASIC METABOLIC PNL TOTAL CA: CPT

## 2024-02-09 PROCEDURE — 84145 PROCALCITONIN (PCT): CPT

## 2024-02-09 PROCEDURE — 74176 CT ABD & PELVIS W/O CONTRAST: CPT

## 2024-02-09 PROCEDURE — 1200000000 HC SEMI PRIVATE

## 2024-02-09 RX ORDER — MECOBALAMIN 5000 MCG
10 TABLET,DISINTEGRATING ORAL NIGHTLY
Status: DISCONTINUED | OUTPATIENT
Start: 2024-02-09 | End: 2024-02-10 | Stop reason: HOSPADM

## 2024-02-09 RX ADMIN — AMLODIPINE BESYLATE 5 MG: 5 TABLET ORAL at 08:40

## 2024-02-09 RX ADMIN — Medication 10 MG: at 20:52

## 2024-02-09 RX ADMIN — Medication 10 ML: at 08:46

## 2024-02-09 RX ADMIN — ENOXAPARIN SODIUM 40 MG: 100 INJECTION SUBCUTANEOUS at 09:03

## 2024-02-09 RX ADMIN — ROSUVASTATIN 20 MG: 10 TABLET, FILM COATED ORAL at 20:55

## 2024-02-09 RX ADMIN — CHOLECALCIFEROL (VITAMIN D3) 10 MCG (400 UNIT) TABLET 400 UNITS: at 08:41

## 2024-02-09 RX ADMIN — Medication 10 ML: at 20:55

## 2024-02-09 RX ADMIN — LORAZEPAM 0.5 MG: 0.5 TABLET ORAL at 08:44

## 2024-02-09 RX ADMIN — FLUTICASONE PROPIONATE 1 SPRAY: 50 SPRAY, METERED NASAL at 08:44

## 2024-02-09 RX ADMIN — METOPROLOL SUCCINATE 25 MG: 25 TABLET, EXTENDED RELEASE ORAL at 08:40

## 2024-02-09 RX ADMIN — CETIRIZINE HYDROCHLORIDE 5 MG: 5 TABLET, FILM COATED ORAL at 20:52

## 2024-02-09 RX ADMIN — ACETAMINOPHEN 650 MG: 325 TABLET ORAL at 04:54

## 2024-02-09 RX ADMIN — PANTOPRAZOLE SODIUM 40 MG: 40 TABLET, DELAYED RELEASE ORAL at 08:41

## 2024-02-09 ASSESSMENT — PAIN SCALES - GENERAL
PAINLEVEL_OUTOF10: 5
PAINLEVEL_OUTOF10: 5

## 2024-02-09 ASSESSMENT — PAIN DESCRIPTION - DESCRIPTORS: DESCRIPTORS: ACHING;DISCOMFORT

## 2024-02-09 ASSESSMENT — PAIN DESCRIPTION - LOCATION
LOCATION: BACK
LOCATION: HEAD

## 2024-02-09 ASSESSMENT — PAIN DESCRIPTION - ORIENTATION: ORIENTATION: ANTERIOR

## 2024-02-09 NOTE — PROGRESS NOTES
Neurology Progress Note    ID: Tomasa Johnson is a 88 y.o. female    : 1935     LOS: 2 days     ASSESSMENT    Principal Problem:    Syncope, unspecified syncope type  Resolved Problems:    * No resolved hospital problems. *    The patient has no focal deficit during my assessment.  The MRI brain showed no acute pathology.  The MRI brain also showed no clear epileptogenic lesion.  The previous EEG from  with EEG monitoring showed no clear epileptiform discharges but focal slowing over the left temporal head region.     From neurology perspective, this episode can be epileptic event if there is no other explanation from metabolic or cardiac origins.    24: The patient has not had any further spells.  The EEG also showed no evidence of seizure tendency.  At this point, the patient has no clear indication for antiseizure medication.  The etiology of the spell can be orthostatic hypotension or cardiac etiology.     Plan:     1.  Conservative management.  No clear indication for antiseizure medication at this time.  2.  Seizure precaution.  3.  Ativan 2 mg as needed for breakthrough seizure.  4.  Maintain hydration.    Neurology will follow up the patient as needed.    Medications:  Scheduled Meds:    lidocaine  1 patch TransDERmal Daily    fluticasone  1 spray Each Nostril Daily    sodium chloride flush  5-40 mL IntraVENous 2 times per day    enoxaparin  40 mg SubCUTAneous Daily    amLODIPine  5 mg Oral Daily    cetirizine  5 mg Oral Nightly    vitamin D3  400 Units Oral Daily    pantoprazole  40 mg Oral QAM AC    rosuvastatin  20 mg Oral Nightly    metoprolol succinate  25 mg Oral Daily     Continuous Infusions:    sodium chloride       PRN Meds: sodium chloride flush, sodium chloride, potassium chloride **OR** potassium alternative oral replacement **OR** potassium chloride, magnesium sulfate, ondansetron **OR** ondansetron, polyethylene glycol, acetaminophen **OR** acetaminophen, perflutren lipid

## 2024-02-09 NOTE — PROGRESS NOTES
Pt A/O x 4. Pt /77 on R. Repeated on L, 189/103. Pt states she has a headache 5/10 across front of head. Pt complaining of intermittent stomach pain. Secure message sent to hospitalist. Currently awaiting response.

## 2024-02-09 NOTE — PLAN OF CARE
Problem: Pain  Goal: Verbalizes/displays adequate comfort level or baseline comfort level  2/8/2024 2235 by Mathew Flores, RN  Flowsheets (Taken 2/8/2024 2235)  Verbalizes/displays adequate comfort level or baseline comfort level:   Encourage patient to monitor pain and request assistance   Assess pain using appropriate pain scale   Implement non-pharmacological measures as appropriate and evaluate response   Administer analgesics based on type and severity of pain and evaluate response     Problem: Safety - Adult  Goal: Free from fall injury  2/8/2024 2235 by Mathew Flores, RN  Outcome: Progressing     Problem: ABCDS Injury Assessment  Goal: Absence of physical injury  Outcome: Progressing  Flowsheets (Taken 2/8/2024 2235)  Absence of Physical Injury: Implement safety measures based on patient assessment

## 2024-02-09 NOTE — PROGRESS NOTES
Hospital Medicine Progress Note      Date of Admission: 2/6/2024  Hospital Day: 4    Chief Admission Complaint:  syncopal episode     Subjective:  reports intermittent  abd pain    Presenting Admission History:       88 y.o. female with a PMH of HTN, CAD, HLD, Anxiety, RLS, GERD, and Peripheral Neuropathy [facility]. Patient reports a sudden feeling of dizziness this morning, such that she attempted to sit down and believes she briefly passed out in a chair. She denies hitting her head. No extremity weakness, facial droop, or speech disturbance.  She reports feeling congested the last couple of days.      Chart review shows: patient was evaluated at  7/12/23:Neuro consult: EEG w severe left hemispheric dysfunction, MRI Head w/o left hemisphere lesion, hyperacute infarction unlikely, probably seizure in left hemisphere (2/2 acute hyponatremia or bupropion use).     In ED, WBC 12.3, afebrile, glucose 116, , Covid/Flu neg, CT head no acute process       Assessment/Plan:      Current Principal Problem:  Syncope, unspecified syncope type    Syncopal episode/Dizziness-possibly due to medications-orthostatsis  -Ortho VS +, IVF x 24hrs  -IVF 24hrs  -Tele  -Neurology consulted-EEG unremarkable, signed off  -MRI brain neg infarct  -Echo 2/7/24-stable  -CT head no acute abnormality  -Evaluated at  7/12/23:Neuro consult: EEG w severe left hemispheric dysfunction, MRI Head w/o left hemisphere lesion, hyperacute infarction unlikely, probably seizure in left hemisphere (2/2 acute hyponatremia or bupropion use).    Leukocytosis-mild, covid/flu neg, UA unremarkable, cxr no infiltrate, Ct a/p-no acute abnormality, PCT/LA wnl     CAD  -Nonobstructive atherosclerosis of coronary artery/SOB   -nonobstructive CAD on angiograms in 2006 and 2012  -normal DSE most recently 11/22/2022  -Trop 42-25, (chronically elevated)     HLD  -continue statin     HTN-uncontrolled  -on  Amlodipine/BB     Anxiety/ RLS/Chronic fatigue-on ativan prn at home     GERD w moderate to large hiatal hernia-PPI         Physical Exam Performed:      General appearance:  No apparent distress  Respiratory:  Normal respiratory effort.   Cardiovascular:  Regular rate and rhythm.  Abdomen:  Soft, non-tender, non-distended.  Musculoskeletal:  No edema  Neurologic:  Non-focal  Psychiatric:  Alert and oriented    /67   Pulse 53   Temp 97.4 °F (36.3 °C) (Oral)   Resp 18   Ht 1.651 m (5' 5\")   Wt 70.3 kg (155 lb)   SpO2 94%   BMI 25.79 kg/m²     Diet: ADULT DIET; Regular; Low Fat/Low Chol/High Fiber/2 gm Na  DVT Prophylaxis: [x]PPx LMWH  []SQ Heparin  []IPC/SCDs  []Eliquis  []Xarelto  []Coumadin  []Other -      Code status: Full Code  PT/OT Eval Status:   []NOT yet ordered  []Ordered and Pending   []Seen with Recommendations for:  []Home independently  [x]Home w/ assist  [x]HHC  []SNF  []Acute Rehab    Anticipated Discharge Day/Date:  1 day, repeat wbc in am    Anticipated Discharge Location: []Home  [x]HHC  []SNF  []Acute Rehab  []ECF  []LTAC  []Hospice  []Other -      Consults:      IP CONSULT TO NEUROLOGY      This patient has a high likelihood of being discharged tomorrow and is appropriate for A1 Discharge Unit in AM pending clinical course overnight: []Yes  [x]No    ------------------------------------------------------------------------------------------------------------------------------------------------------------------------    MDM      [x] High (any 2)    A. Problems (any 1)  [x] Acute/Chronic Illness/injury posing threat to life or bodily function:    [] Severe exacerbation of chronic illness:    ---------------------------------------------------------------------  B. Risk of Treatment (any 1)   [] Drugs/treatments that require intensive monitoring for toxicity include:    [] Change in code status:    [] Decision to escalate care:    [] Major surgery/procedure with associated risk factors:

## 2024-02-09 NOTE — PROGRESS NOTES
Received patient in bed, alert oriented x4, VSS. Shift assessment done as charted. Denies need as this time. Up as tolerated to bathroom with standby assist. Non skid foot ware on.  Kept bed in lowest position, call light and bedside table within reach. Instructed to call out for assistance.

## 2024-02-10 VITALS
WEIGHT: 155 LBS | DIASTOLIC BLOOD PRESSURE: 71 MMHG | SYSTOLIC BLOOD PRESSURE: 127 MMHG | BODY MASS INDEX: 25.83 KG/M2 | HEIGHT: 65 IN | HEART RATE: 70 BPM | TEMPERATURE: 97.6 F | RESPIRATION RATE: 18 BRPM | OXYGEN SATURATION: 96 %

## 2024-02-10 LAB
ANION GAP SERPL CALCULATED.3IONS-SCNC: 11 MMOL/L (ref 3–16)
BASOPHILS # BLD: 0 K/UL (ref 0–0.2)
BASOPHILS NFR BLD: 0 %
BUN SERPL-MCNC: 26 MG/DL (ref 7–20)
CALCIUM SERPL-MCNC: 9.2 MG/DL (ref 8.3–10.6)
CHLORIDE SERPL-SCNC: 105 MMOL/L (ref 99–110)
CO2 SERPL-SCNC: 22 MMOL/L (ref 21–32)
CREAT SERPL-MCNC: 1.1 MG/DL (ref 0.6–1.2)
DEPRECATED RDW RBC AUTO: 13.9 % (ref 12.4–15.4)
EOSINOPHIL # BLD: 0.1 K/UL (ref 0–0.6)
EOSINOPHIL NFR BLD: 1 %
GFR SERPLBLD CREATININE-BSD FMLA CKD-EPI: 48 ML/MIN/{1.73_M2}
GLUCOSE SERPL-MCNC: 95 MG/DL (ref 70–99)
HCT VFR BLD AUTO: 44.1 % (ref 36–48)
HGB BLD-MCNC: 14.7 G/DL (ref 12–16)
LYMPHOCYTES # BLD: 6.5 K/UL (ref 1–5.1)
LYMPHOCYTES NFR BLD: 48 %
MCH RBC QN AUTO: 32.8 PG (ref 26–34)
MCHC RBC AUTO-ENTMCNC: 33.2 G/DL (ref 31–36)
MCV RBC AUTO: 98.6 FL (ref 80–100)
MONOCYTES # BLD: 0.7 K/UL (ref 0–1.3)
MONOCYTES NFR BLD: 5 %
NEUTROPHILS # BLD: 6.2 K/UL (ref 1.7–7.7)
NEUTROPHILS NFR BLD: 42 %
NEUTS BAND NFR BLD MANUAL: 4 % (ref 0–7)
PATH INTERP BLD-IMP: YES
PLATELET # BLD AUTO: 265 K/UL (ref 135–450)
PLATELET BLD QL SMEAR: ADEQUATE
PMV BLD AUTO: 8.1 FL (ref 5–10.5)
POTASSIUM SERPL-SCNC: 4.3 MMOL/L (ref 3.5–5.1)
RBC # BLD AUTO: 4.47 M/UL (ref 4–5.2)
SLIDE REVIEW: ABNORMAL
SODIUM SERPL-SCNC: 138 MMOL/L (ref 136–145)
WBC # BLD AUTO: 13.5 K/UL (ref 4–11)

## 2024-02-10 PROCEDURE — 6370000000 HC RX 637 (ALT 250 FOR IP): Performed by: NURSE PRACTITIONER

## 2024-02-10 PROCEDURE — 6360000002 HC RX W HCPCS: Performed by: NURSE PRACTITIONER

## 2024-02-10 PROCEDURE — 36415 COLL VENOUS BLD VENIPUNCTURE: CPT

## 2024-02-10 PROCEDURE — 80048 BASIC METABOLIC PNL TOTAL CA: CPT

## 2024-02-10 PROCEDURE — 85025 COMPLETE CBC W/AUTO DIFF WBC: CPT

## 2024-02-10 PROCEDURE — 2580000003 HC RX 258: Performed by: NURSE PRACTITIONER

## 2024-02-10 PROCEDURE — 96372 THER/PROPH/DIAG INJ SC/IM: CPT

## 2024-02-10 RX ADMIN — Medication 10 ML: at 09:07

## 2024-02-10 RX ADMIN — PANTOPRAZOLE SODIUM 40 MG: 40 TABLET, DELAYED RELEASE ORAL at 09:07

## 2024-02-10 RX ADMIN — ENOXAPARIN SODIUM 40 MG: 100 INJECTION SUBCUTANEOUS at 09:07

## 2024-02-10 RX ADMIN — FLUTICASONE PROPIONATE 1 SPRAY: 50 SPRAY, METERED NASAL at 09:10

## 2024-02-10 RX ADMIN — CHOLECALCIFEROL (VITAMIN D3) 10 MCG (400 UNIT) TABLET 400 UNITS: at 09:07

## 2024-02-10 RX ADMIN — METOPROLOL SUCCINATE 25 MG: 25 TABLET, EXTENDED RELEASE ORAL at 09:07

## 2024-02-10 RX ADMIN — AMLODIPINE BESYLATE 5 MG: 5 TABLET ORAL at 09:05

## 2024-02-10 NOTE — PLAN OF CARE
Problem: Pain  Goal: Verbalizes/displays adequate comfort level or baseline comfort level  2/9/2024 2032 by Layla Mcdonough RN  Outcome: Progressing  Flowsheets (Taken 2/8/2024 2235 by Mathew Flores RN)  Verbalizes/displays adequate comfort level or baseline comfort level:   Encourage patient to monitor pain and request assistance   Assess pain using appropriate pain scale   Implement non-pharmacological measures as appropriate and evaluate response   Administer analgesics based on type and severity of pain and evaluate response  2/9/2024 1943 by Bj Santacruz RN  Outcome: Progressing     Problem: Safety - Adult  Goal: Free from fall injury  2/9/2024 2032 by Layla Mcdonough RN  Outcome: Progressing  2/9/2024 1943 by Bj Santacruz RN  Outcome: Progressing     Problem: ABCDS Injury Assessment  Goal: Absence of physical injury  2/9/2024 2032 by Layla Mcdonough RN  Outcome: Progressing  2/9/2024 1943 by Bj Santacruz RN  Outcome: Progressing

## 2024-02-10 NOTE — DISCHARGE SUMMARY
Hospital Medicine Discharge Summary    Patient: Tomasa Johnson   : 1935     Admit Date: 2024   Discharge Date: 2/10/2024    Disposition:  [x]Home   []HHC  []SNF  []ECF  []Acute Rehab  []LTAC  []Hospice  Code status:  []Full  []DNR/CCA  []Limited (DNR/CCA with Do Not Intubate)  []DNRCC  Condition at Discharge: Stable  Primary Care Provider: Miguel Ángel Curiel MD    Admitting Provider: Deandre Bledsoe MD  Discharge Provider: DINESH DODGE MD     Discharge Diagnoses:      Active Hospital Problems    Diagnosis     Syncope, unspecified syncope type [R55]        Presenting Admission History:      88 y.o. female with a PMH of HTN, CAD, HLD, Anxiety, RLS, GERD, and Peripheral Neuropathy who presented to [facility] with a complaint of syncope. Patient reports a sudden feeling of dizziness this morning, such that she attempted to sit down and believes she briefly passed out in a chair. She denied hitting her head. No extremity weakness, facial droop, or speech disturbance.     She was admitted for evaluation management.  She was seen by neurology in consultation.  MRI of the brain showed no acute pathology  Her condition did stabilize and she was doing well and stable for discharge home.     Assessment/Plan:      yncopal episode/Dizziness-possibly due to medications-orthostatsis  -Ortho VS +, IVF x 24hrs  -IVF 24hrs  -Tele  -Neurology consulted-EEG unremarkable, signed off  -MRI brain neg infarct  -Echo 24-stable  -CT head no acute abnormality  Evaluated at  23:Neuro consult: EEG w severe left hemispheric dysfunction, MRI Head w/o left hemisphere lesion, hyperacute infarction unlikely, probably seizure in left hemisphere (2/2 acute hyponatremia or bupropion use).   She was seen by neurology this admission.  MRI of the brain showed no acute pathology.  And also showed no clear  eliptogenic lesion.   The patient had no recurrent spells.  There was no clear indication for antiseizure  \"LABURIN\"  Blood Cultures: No results found for: \"BC\"  No results found for: \"BLOODCULT2\"  Organism: No results found for: \"ORG\"    Signed:    DINESH DODGE MD

## 2024-02-10 NOTE — PROGRESS NOTES
Pt a/o. VSS. Shift assessment updated and documented.  Met patient in bed  , Aox4 , nil concerns verbalized .

## 2024-02-10 NOTE — PROGRESS NOTES
Pt a/o x4. VSS. All prescriptions, discharge and follow up instructions given to the patient.  The patient verbalizes understanding and denies questions.  All belongings collected and sent with the patient. The patient was discharged off the unit by wheelchair and nurse in stable condition.

## 2024-02-12 LAB — PATH INTERP BLD-IMP: NORMAL

## 2024-06-13 ENCOUNTER — APPOINTMENT (OUTPATIENT)
Dept: GENERAL RADIOLOGY | Age: 89
End: 2024-06-13
Payer: MEDICARE

## 2024-06-13 ENCOUNTER — APPOINTMENT (OUTPATIENT)
Dept: MRI IMAGING | Age: 89
End: 2024-06-13
Payer: MEDICARE

## 2024-06-13 ENCOUNTER — HOSPITAL ENCOUNTER (OUTPATIENT)
Age: 89
Setting detail: OBSERVATION
Discharge: HOME | End: 2024-06-16
Attending: EMERGENCY MEDICINE | Admitting: INTERNAL MEDICINE
Payer: MEDICARE

## 2024-06-13 ENCOUNTER — APPOINTMENT (OUTPATIENT)
Dept: CT IMAGING | Age: 89
End: 2024-06-13
Payer: MEDICARE

## 2024-06-13 DIAGNOSIS — R42 DIZZINESS: Primary | ICD-10-CM

## 2024-06-13 DIAGNOSIS — R29.898 RIGHT LEG WEAKNESS: ICD-10-CM

## 2024-06-13 DIAGNOSIS — R29.810 FACIAL DROOP: ICD-10-CM

## 2024-06-13 LAB
ALBUMIN SERPL-MCNC: 4.5 G/DL (ref 3.4–5)
ALBUMIN/GLOB SERPL: 1.7 {RATIO} (ref 1.1–2.2)
ALP SERPL-CCNC: 108 U/L (ref 40–129)
ALT SERPL-CCNC: 13 U/L (ref 10–40)
ANION GAP SERPL CALCULATED.3IONS-SCNC: 11 MMOL/L (ref 3–16)
AST SERPL-CCNC: 23 U/L (ref 15–37)
BASOPHILS # BLD: 0.1 K/UL (ref 0–0.2)
BASOPHILS NFR BLD: 0.6 %
BILIRUB SERPL-MCNC: 0.5 MG/DL (ref 0–1)
BUN SERPL-MCNC: 21 MG/DL (ref 7–20)
CALCIUM SERPL-MCNC: 9.2 MG/DL (ref 8.3–10.6)
CHLORIDE SERPL-SCNC: 100 MMOL/L (ref 99–110)
CO2 SERPL-SCNC: 28 MMOL/L (ref 21–32)
CREAT SERPL-MCNC: 1.2 MG/DL (ref 0.6–1.2)
DEPRECATED RDW RBC AUTO: 13.7 % (ref 12.4–15.4)
EKG ATRIAL RATE: 77 BPM
EKG DIAGNOSIS: NORMAL
EKG P AXIS: 6 DEGREES
EKG P-R INTERVAL: 170 MS
EKG Q-T INTERVAL: 396 MS
EKG QRS DURATION: 92 MS
EKG QTC CALCULATION (BAZETT): 448 MS
EKG R AXIS: -41 DEGREES
EKG T AXIS: 30 DEGREES
EKG VENTRICULAR RATE: 77 BPM
EOSINOPHIL # BLD: 0.2 K/UL (ref 0–0.6)
EOSINOPHIL NFR BLD: 1.9 %
GFR SERPLBLD CREATININE-BSD FMLA CKD-EPI: 44 ML/MIN/{1.73_M2}
GLUCOSE SERPL-MCNC: 112 MG/DL (ref 70–99)
HCT VFR BLD AUTO: 49.5 % (ref 36–48)
HGB BLD-MCNC: 16.4 G/DL (ref 12–16)
INR PPP: 0.96 (ref 0.85–1.15)
LYMPHOCYTES # BLD: 5.1 K/UL (ref 1–5.1)
LYMPHOCYTES NFR BLD: 42.4 %
MCH RBC QN AUTO: 31.2 PG (ref 26–34)
MCHC RBC AUTO-ENTMCNC: 33 G/DL (ref 31–36)
MCV RBC AUTO: 94.4 FL (ref 80–100)
MONOCYTES # BLD: 0.7 K/UL (ref 0–1.3)
MONOCYTES NFR BLD: 6.2 %
NEUTROPHILS # BLD: 5.8 K/UL (ref 1.7–7.7)
NEUTROPHILS NFR BLD: 48.9 %
PATH INTERP BLD-IMP: NORMAL
PLATELET # BLD AUTO: 260 K/UL (ref 135–450)
PMV BLD AUTO: 7.6 FL (ref 5–10.5)
POTASSIUM SERPL-SCNC: 4 MMOL/L (ref 3.5–5.1)
PROT SERPL-MCNC: 7.1 G/DL (ref 6.4–8.2)
PROTHROMBIN TIME: 13 SEC (ref 11.9–14.9)
RBC # BLD AUTO: 5.25 M/UL (ref 4–5.2)
SODIUM SERPL-SCNC: 139 MMOL/L (ref 136–145)
TROPONIN, HIGH SENSITIVITY: 30 NG/L (ref 0–14)
WBC # BLD AUTO: 11.9 K/UL (ref 4–11)

## 2024-06-13 PROCEDURE — 96372 THER/PROPH/DIAG INJ SC/IM: CPT

## 2024-06-13 PROCEDURE — 6370000000 HC RX 637 (ALT 250 FOR IP): Performed by: NURSE PRACTITIONER

## 2024-06-13 PROCEDURE — 93005 ELECTROCARDIOGRAM TRACING: CPT | Performed by: EMERGENCY MEDICINE

## 2024-06-13 PROCEDURE — 85025 COMPLETE CBC W/AUTO DIFF WBC: CPT

## 2024-06-13 PROCEDURE — 80053 COMPREHEN METABOLIC PANEL: CPT

## 2024-06-13 PROCEDURE — 6370000000 HC RX 637 (ALT 250 FOR IP): Performed by: EMERGENCY MEDICINE

## 2024-06-13 PROCEDURE — G0378 HOSPITAL OBSERVATION PER HR: HCPCS

## 2024-06-13 PROCEDURE — 96374 THER/PROPH/DIAG INJ IV PUSH: CPT

## 2024-06-13 PROCEDURE — 70498 CT ANGIOGRAPHY NECK: CPT

## 2024-06-13 PROCEDURE — APPSS30 APP SPLIT SHARED TIME 16-30 MINUTES

## 2024-06-13 PROCEDURE — 93010 ELECTROCARDIOGRAM REPORT: CPT | Performed by: INTERNAL MEDICINE

## 2024-06-13 PROCEDURE — 71046 X-RAY EXAM CHEST 2 VIEWS: CPT

## 2024-06-13 PROCEDURE — 83036 HEMOGLOBIN GLYCOSYLATED A1C: CPT

## 2024-06-13 PROCEDURE — 84484 ASSAY OF TROPONIN QUANT: CPT

## 2024-06-13 PROCEDURE — 2580000003 HC RX 258: Performed by: NURSE PRACTITIONER

## 2024-06-13 PROCEDURE — 6360000002 HC RX W HCPCS: Performed by: NURSE PRACTITIONER

## 2024-06-13 PROCEDURE — 70551 MRI BRAIN STEM W/O DYE: CPT

## 2024-06-13 PROCEDURE — 6360000004 HC RX CONTRAST MEDICATION: Performed by: EMERGENCY MEDICINE

## 2024-06-13 PROCEDURE — 99223 1ST HOSP IP/OBS HIGH 75: CPT | Performed by: PSYCHIATRY & NEUROLOGY

## 2024-06-13 PROCEDURE — 99285 EMERGENCY DEPT VISIT HI MDM: CPT

## 2024-06-13 PROCEDURE — 70450 CT HEAD/BRAIN W/O DYE: CPT

## 2024-06-13 PROCEDURE — 80061 LIPID PANEL: CPT

## 2024-06-13 PROCEDURE — 85610 PROTHROMBIN TIME: CPT

## 2024-06-13 RX ORDER — CETIRIZINE HYDROCHLORIDE 10 MG/1
10 TABLET ORAL DAILY
Status: DISCONTINUED | OUTPATIENT
Start: 2024-06-13 | End: 2024-06-16 | Stop reason: HOSPADM

## 2024-06-13 RX ORDER — ONDANSETRON 2 MG/ML
4 INJECTION INTRAMUSCULAR; INTRAVENOUS EVERY 6 HOURS PRN
Status: DISCONTINUED | OUTPATIENT
Start: 2024-06-13 | End: 2024-06-16 | Stop reason: HOSPADM

## 2024-06-13 RX ORDER — SODIUM CHLORIDE 0.9 % (FLUSH) 0.9 %
10 SYRINGE (ML) INJECTION EVERY 12 HOURS SCHEDULED
Status: DISCONTINUED | OUTPATIENT
Start: 2024-06-13 | End: 2024-06-16 | Stop reason: HOSPADM

## 2024-06-13 RX ORDER — LORAZEPAM 2 MG/ML
0.5 INJECTION INTRAMUSCULAR ONCE
Status: COMPLETED | OUTPATIENT
Start: 2024-06-13 | End: 2024-06-13

## 2024-06-13 RX ORDER — ENOXAPARIN SODIUM 100 MG/ML
40 INJECTION SUBCUTANEOUS DAILY
Status: DISCONTINUED | OUTPATIENT
Start: 2024-06-13 | End: 2024-06-16 | Stop reason: HOSPADM

## 2024-06-13 RX ORDER — ROSUVASTATIN CALCIUM 10 MG/1
20 TABLET, COATED ORAL NIGHTLY
Status: DISCONTINUED | OUTPATIENT
Start: 2024-06-13 | End: 2024-06-16 | Stop reason: HOSPADM

## 2024-06-13 RX ORDER — BUPROPION HYDROCHLORIDE 150 MG/1
150 TABLET, EXTENDED RELEASE ORAL NIGHTLY
Status: DISCONTINUED | OUTPATIENT
Start: 2024-06-13 | End: 2024-06-16 | Stop reason: HOSPADM

## 2024-06-13 RX ORDER — ALBUTEROL SULFATE 90 UG/1
2 AEROSOL, METERED RESPIRATORY (INHALATION) EVERY 6 HOURS PRN
Status: DISCONTINUED | OUTPATIENT
Start: 2024-06-13 | End: 2024-06-16 | Stop reason: HOSPADM

## 2024-06-13 RX ORDER — ASPIRIN 81 MG/1
81 TABLET, CHEWABLE ORAL DAILY
Status: DISCONTINUED | OUTPATIENT
Start: 2024-06-14 | End: 2024-06-16 | Stop reason: HOSPADM

## 2024-06-13 RX ORDER — ONDANSETRON 4 MG/1
4 TABLET, ORALLY DISINTEGRATING ORAL EVERY 8 HOURS PRN
Status: DISCONTINUED | OUTPATIENT
Start: 2024-06-13 | End: 2024-06-16 | Stop reason: HOSPADM

## 2024-06-13 RX ORDER — SODIUM CHLORIDE 0.9 % (FLUSH) 0.9 %
10 SYRINGE (ML) INJECTION PRN
Status: DISCONTINUED | OUTPATIENT
Start: 2024-06-13 | End: 2024-06-16 | Stop reason: HOSPADM

## 2024-06-13 RX ORDER — FLUTICASONE PROPIONATE 50 MCG
2 SPRAY, SUSPENSION (ML) NASAL DAILY
Status: DISCONTINUED | OUTPATIENT
Start: 2024-06-13 | End: 2024-06-16 | Stop reason: HOSPADM

## 2024-06-13 RX ORDER — LORAZEPAM 0.5 MG/1
0.5 TABLET ORAL EVERY MORNING
Status: DISCONTINUED | OUTPATIENT
Start: 2024-06-14 | End: 2024-06-16 | Stop reason: HOSPADM

## 2024-06-13 RX ORDER — ASPIRIN 300 MG/1
300 SUPPOSITORY RECTAL DAILY
Status: DISCONTINUED | OUTPATIENT
Start: 2024-06-14 | End: 2024-06-16 | Stop reason: HOSPADM

## 2024-06-13 RX ORDER — SODIUM CHLORIDE 9 MG/ML
INJECTION, SOLUTION INTRAVENOUS PRN
Status: DISCONTINUED | OUTPATIENT
Start: 2024-06-13 | End: 2024-06-16 | Stop reason: HOSPADM

## 2024-06-13 RX ORDER — ASPIRIN 81 MG/1
324 TABLET, CHEWABLE ORAL DAILY
Status: DISCONTINUED | OUTPATIENT
Start: 2024-06-13 | End: 2024-06-13

## 2024-06-13 RX ADMIN — BUPROPION HYDROCHLORIDE 150 MG: 150 TABLET, EXTENDED RELEASE ORAL at 23:10

## 2024-06-13 RX ADMIN — CHOLECALCIFEROL (VITAMIN D3) 10 MCG (400 UNIT) TABLET 400 UNITS: at 13:04

## 2024-06-13 RX ADMIN — LORAZEPAM 0.5 MG: 2 INJECTION INTRAMUSCULAR; INTRAVENOUS at 14:24

## 2024-06-13 RX ADMIN — ENOXAPARIN SODIUM 40 MG: 100 INJECTION SUBCUTANEOUS at 12:55

## 2024-06-13 RX ADMIN — SODIUM CHLORIDE, PRESERVATIVE FREE 10 ML: 5 INJECTION INTRAVENOUS at 20:43

## 2024-06-13 RX ADMIN — ASPIRIN 81 MG 324 MG: 81 TABLET ORAL at 10:20

## 2024-06-13 RX ADMIN — IOPAMIDOL 75 ML: 755 INJECTION, SOLUTION INTRAVENOUS at 09:08

## 2024-06-13 RX ADMIN — CETIRIZINE HYDROCHLORIDE 10 MG: 10 TABLET, FILM COATED ORAL at 12:55

## 2024-06-13 RX ADMIN — ROSUVASTATIN 20 MG: 10 TABLET, FILM COATED ORAL at 20:43

## 2024-06-13 ASSESSMENT — PAIN - FUNCTIONAL ASSESSMENT
PAIN_FUNCTIONAL_ASSESSMENT_SITE2: ACTIVITIES ARE NOT PREVENTED
PAIN_FUNCTIONAL_ASSESSMENT: 0-10
PAIN_FUNCTIONAL_ASSESSMENT: ACTIVITIES ARE NOT PREVENTED

## 2024-06-13 ASSESSMENT — PAIN SCALES - GENERAL
PAINLEVEL_OUTOF10: 0
PAINLEVEL_OUTOF10: 0
PAINLEVEL_OUTOF10: 4
PAINLEVEL_OUTOF10: 0

## 2024-06-13 ASSESSMENT — PAIN DESCRIPTION - LOCATION
LOCATION_2: HEAD
LOCATION: BACK

## 2024-06-13 ASSESSMENT — PAIN DESCRIPTION - PAIN TYPE: TYPE: CHRONIC PAIN

## 2024-06-13 ASSESSMENT — PAIN DESCRIPTION - DESCRIPTORS
DESCRIPTORS_2: ACHING
DESCRIPTORS: ACHING

## 2024-06-13 ASSESSMENT — PAIN DESCRIPTION - ONSET: ONSET: ON-GOING

## 2024-06-13 ASSESSMENT — PAIN DESCRIPTION - FREQUENCY: FREQUENCY: CONTINUOUS

## 2024-06-13 ASSESSMENT — PAIN DESCRIPTION - ORIENTATION
ORIENTATION: LOWER
ORIENTATION_2: MID;ANTERIOR

## 2024-06-13 ASSESSMENT — PAIN DESCRIPTION - INTENSITY: RATING_2: 5

## 2024-06-13 NOTE — PLAN OF CARE
Stroke Team Note    Ms. Johnson is a 88 year old woman who presents with dizziness. She woke up 4 hours prior and laid in bed, she felt \"off\" and \"not great\" but without dizziness. Then got up and went to front door and had dizziness that she best describes as feeling like she was going to pass out and light headed. Came to ED. On my evaluation, NIHSS 1 for mild right facial droop (per family at bedside she usually does have mild right facial, may be more prominent today), no dysmetria on FNF, able to ambulate, able to swallow. CTH without acute blood, CTA with clear LVO. Given mild stroke symptoms that may be not be new that appears to be non-disabling, no TNK given.     Recommend:  - ASA 325mg  - Local neurology consult    Paul Wechsler, MD  Stroke Fellow

## 2024-06-13 NOTE — H&P
Hospital Medicine History & Physical      Date of Admission: 6/13/2024    Date of Service:  Pt seen/examined on 06/13/24    []Admitted to Inpatient with expected LOS greater than two midnights due to medical therapy.  [x]Placed in Observation status.    Chief Admission Complaint:    Chief Complaint   Patient presents with    Hypertension     Reports feeling generally unwell over the last few weeks. States today her SBP was 190+, does not normally check her blood pressure. Has some dizziness and slight headache.     Presenting Admission History:      88 y.o. female who presented to [facility] with dizziness, right leg weakness.  PMHx significant for HTN, HLD, anxiety, peripheral neuropathy.  History was obtained from the patient and review of the EMR.  The patient tells me that she has been feeling unwell over the past few weeks and just feels weaker in general.  When she woke up this morning, she got up to make her bed and started feeling very lightheaded and like she was going to pass out.  Her symptoms improved somewhat with sitting.  However, due to her overwhelming weakness, she decided to come into the ED for further evaluation.  Stroke team was consulted who did not recommend TNK, but did recommend neurology consultation.  Neurology was consulted.  She denies any other symptoms during my visit today.  Orthostatic vitals are pending.  MRI brain is pending.  She will be admitted for further evaluation    Assessment/Plan:      Current Principal Problem:  Dizziness of unknown etiology    Dizziness, weakness, unclear etiology  - CT head with no acute intracranial abnormality.  Small vessel ischemic disease  - CTA head/neck with no flow-limiting stenosis seen of the cervical carotid/vertebral arteries.  Possible fibromuscular dysplasia  - MRI brain pending  - Orthostatic vitals pending  - Consider echo if symptoms continue to worsen  - Neurology consulted, appreciate their recommendations  - Telemetry

## 2024-06-13 NOTE — ED PROVIDER NOTES
[facility]  ED     EMERGENCY DEPARTMENT ENCOUNTER            Pt Name: Tomasa Johnsno   MRN: [id number]   Birthdate 1935   Date of evaluation: 6/13/2024   Provider: Deandre Perez II, DO   PCP: Miguel Ángel Curiel MD   Note Started: 9:09 AM EDT 6/13/24          CHIEF COMPLAINT     Chief Complaint   Patient presents with    Hypertension     Reports feeling generally unwell over the last few weeks. States today her SBP was 190+, does not normally check her blood pressure. Has some dizziness and slight headache.             HISTORY OF PRESENT ILLNESS:   History from : Patient and Family son present at bedside    Limitations to history : None     Tomasa Johnson is a 88 y.o. female who presents to the emergency department initially complaining of \"hypertension.  \"Patient states that she was to bed at approximately 930 last night and woke up this morning at approximately 630.  Patient was able to get up and make her bed without symptoms but shortly thereafter began developing \"dizziness.\"  Patient reports feeling lightheaded and that she may pass out but felt unstable standing symptoms improved somewhat with sitting.  No loss of consciousness or palpitations noted.  Patient denies any additional symptoms.(Of note, patient did not note facial drooping or weakness on arrival.)  Weakness noted at 845 and stroke alert initiated.  Nursing Notes were all reviewed and agreed with, or any disagreements were addressed in the HPI.     REVIEW OF SYSTEMS :    Positives and Pertinent negatives as per HPI.      MEDICAL HISTORY   has a past medical history of Back pain, GERD (gastroesophageal reflux disease), Hyperlipidemia, Hypertension, and Peripheral neuropathy.    Past Surgical History:   Procedure Laterality Date    CARPAL TUNNEL RELEASE Right 10/1/2019    RIGHT OPEN CARPAL TUNNEL RELEASE performed by Jared Joyner MD at [facility] OR    CATARACT REMOVAL WITH IMPLANT Bilateral     CHOLECYSTECTOMY

## 2024-06-13 NOTE — ED NOTES
@0856  called [facility] Stroke  @0856 Called Ct  @0856 Called  Stroke Team  @0856 Called Lab  @0859 Octavio from  Stroke Team called back and spoke to

## 2024-06-13 NOTE — CONSULTS
Consult [employer]/called to Neurology on 06/13/24 at 12:11 PM Lay Quiroga  
intact. No nystagmus  V: Facial sensation is intact  VII: Mild R facial droop, baseline.   IX: Normal palatal elevation and shoulder shrug  XII: Tongue movements are normal  Musculoskeletal: 5/5 in all 4 extremities.  Good range of motion.  No muscle atrophy.    Tone: Normal tone.   Reflexes: Symmetric 2+ in the arms and 2+ in the legs   Planters: Flexor bilaterally  Coordination: no pronator drift, no dysmetria with FNF and normal REM  Sensation: normal in both arms and legs.  Gait/Posture: Pt lying in bed at time of examination.         Medical decision making:      A. Problems (any 1)    High:    [x] Acute/Chronic Illness/injury posing threat to life or bodily function: CVA  [] Severe exacerbation of chronic illness:      Moderate:    []     1 or more chronic illness with exacerbation, progression or side effect of treatment or  []     2 or more stable chronic illnesses or  []     1 acute illness with systemic symptoms     ---------------------------------------------------------------------  B. Risk of Treatment (any 1)   [x] Drugs/treatments that require intensive monitoring for toxicity include: aspirin  [] Change in code status:    [] Decision to escalate care:    [] Major surgery/procedure with associated risk factors:    [x] Prescription drug management  ----------------------------------------------------------------------  [x] High (any 2)  [] Moderate (any 1)    C. Data (any 2 for high and any 1 for moderate)  [] Discussed management of the case with:    [x] Imaging personally reviewed and interpreted  [x] Data Review (any 3)  [x] Collateral history obtained from: family at bedside  [x] All available Consultant notes from yesterday/today were reviewed  [x] All current labs were reviewed and interpreted for clinical significance   [x] Appropriate follow-up labs were ordered    Data: reviewed   LABS:   Lab Results   Component Value Date/Time     06/13/2024 08:41 AM    K 4.0 06/13/2024 08:41 AM    CL

## 2024-06-13 NOTE — RT PROTOCOL NOTE
using Per Protocol order mode.        4-6 - enter or revise RT Bronchodilator order(s) to two equivalent RT bronchodilator orders with one order with BID Frequency and one order with Frequency of every 4 hours PRN wheezing or increased work of breathing using Per Protocol order mode.        7-10 - enter or revise RT Bronchodilator order(s) to two equivalent RT bronchodilator orders with one order with TID Frequency and one order with Frequency of every 4 hours PRN wheezing or increased work of breathing using Per Protocol order mode.       11-13 - enter or revise RT Bronchodilator order(s) to one equivalent RT bronchodilator order with QID Frequency and an Albuterol order with Frequency of every 4 hours PRN wheezing or increased work of breathing using Per Protocol order mode.      Greater than 13 - enter or revise RT Bronchodilator order(s) to one equivalent RT bronchodilator order with every 4 hours Frequency and an Albuterol order with Frequency of every 2 hours PRN wheezing or increased work of breathing using Per Protocol order mode.     RT to enter RT Home Evaluation for COPD & MDI Assessment order using Per Protocol order mode.    Electronically signed by Lily Munguia RCP on 6/13/2024 at 11:53 AM

## 2024-06-14 LAB
CHOLEST SERPL-MCNC: 169 MG/DL (ref 0–199)
EST. AVERAGE GLUCOSE BLD GHB EST-MCNC: 111.2 MG/DL
HBA1C MFR BLD: 5.5 %
HDLC SERPL-MCNC: 79 MG/DL (ref 40–60)
LDLC SERPL CALC-MCNC: 75 MG/DL
TRIGL SERPL-MCNC: 77 MG/DL (ref 0–150)
VLDLC SERPL CALC-MCNC: 15 MG/DL

## 2024-06-14 PROCEDURE — 6370000000 HC RX 637 (ALT 250 FOR IP): Performed by: NURSE PRACTITIONER

## 2024-06-14 PROCEDURE — G0378 HOSPITAL OBSERVATION PER HR: HCPCS

## 2024-06-14 PROCEDURE — 97165 OT EVAL LOW COMPLEX 30 MIN: CPT

## 2024-06-14 PROCEDURE — 97535 SELF CARE MNGMENT TRAINING: CPT

## 2024-06-14 PROCEDURE — 96372 THER/PROPH/DIAG INJ SC/IM: CPT

## 2024-06-14 PROCEDURE — APPSS30 APP SPLIT SHARED TIME 16-30 MINUTES

## 2024-06-14 PROCEDURE — 6360000002 HC RX W HCPCS: Performed by: NURSE PRACTITIONER

## 2024-06-14 PROCEDURE — 2580000003 HC RX 258: Performed by: NURSE PRACTITIONER

## 2024-06-14 PROCEDURE — 99233 SBSQ HOSP IP/OBS HIGH 50: CPT | Performed by: PSYCHIATRY & NEUROLOGY

## 2024-06-14 RX ORDER — METOPROLOL SUCCINATE 25 MG/1
25 TABLET, EXTENDED RELEASE ORAL DAILY
Status: DISCONTINUED | OUTPATIENT
Start: 2024-06-14 | End: 2024-06-15

## 2024-06-14 RX ORDER — CARBOXYMETHYLCELLULOSE SODIUM 10 MG/ML
1 GEL OPHTHALMIC DAILY
Status: DISCONTINUED | OUTPATIENT
Start: 2024-06-14 | End: 2024-06-16 | Stop reason: HOSPADM

## 2024-06-14 RX ORDER — ACETAMINOPHEN 325 MG/1
650 TABLET ORAL EVERY 4 HOURS PRN
Status: DISCONTINUED | OUTPATIENT
Start: 2024-06-14 | End: 2024-06-16 | Stop reason: HOSPADM

## 2024-06-14 RX ORDER — TRAMADOL HYDROCHLORIDE 50 MG/1
50 TABLET ORAL EVERY 6 HOURS PRN
Status: DISCONTINUED | OUTPATIENT
Start: 2024-06-14 | End: 2024-06-16 | Stop reason: HOSPADM

## 2024-06-14 RX ORDER — AMLODIPINE BESYLATE 5 MG/1
5 TABLET ORAL DAILY
Status: DISCONTINUED | OUTPATIENT
Start: 2024-06-14 | End: 2024-06-16 | Stop reason: HOSPADM

## 2024-06-14 RX ADMIN — LORAZEPAM 0.5 MG: 0.5 TABLET ORAL at 09:56

## 2024-06-14 RX ADMIN — METOPROLOL SUCCINATE 25 MG: 25 TABLET, EXTENDED RELEASE ORAL at 18:25

## 2024-06-14 RX ADMIN — ROSUVASTATIN 20 MG: 10 TABLET, FILM COATED ORAL at 21:49

## 2024-06-14 RX ADMIN — SODIUM CHLORIDE, PRESERVATIVE FREE 10 ML: 5 INJECTION INTRAVENOUS at 21:49

## 2024-06-14 RX ADMIN — CETIRIZINE HYDROCHLORIDE 10 MG: 10 TABLET, FILM COATED ORAL at 09:56

## 2024-06-14 RX ADMIN — CHOLECALCIFEROL (VITAMIN D3) 10 MCG (400 UNIT) TABLET 400 UNITS: at 09:56

## 2024-06-14 RX ADMIN — ENOXAPARIN SODIUM 40 MG: 100 INJECTION SUBCUTANEOUS at 09:56

## 2024-06-14 RX ADMIN — ASPIRIN 81 MG 81 MG: 81 TABLET ORAL at 09:56

## 2024-06-14 RX ADMIN — BUPROPION HYDROCHLORIDE 150 MG: 150 TABLET, EXTENDED RELEASE ORAL at 21:49

## 2024-06-14 RX ADMIN — AMLODIPINE BESYLATE 5 MG: 5 TABLET ORAL at 18:26

## 2024-06-14 RX ADMIN — SODIUM CHLORIDE, PRESERVATIVE FREE 10 ML: 5 INJECTION INTRAVENOUS at 09:57

## 2024-06-14 RX ADMIN — ACETAMINOPHEN 650 MG: 325 TABLET ORAL at 04:15

## 2024-06-14 ASSESSMENT — PAIN SCALES - GENERAL
PAINLEVEL_OUTOF10: 4
PAINLEVEL_OUTOF10: 0
PAINLEVEL_OUTOF10: 3

## 2024-06-14 NOTE — CARE COORDINATION
Writer reviewed chart, patient is IPTA from home alone. MD will watch BP overnight and possible DC home 6/15.    Zenaida Rosen RN    
Housing: (P) Yes  Other Identified Issues/Barriers to RETURNING to current housing: none noted  Potential Assistance needed at discharge: (P) N/A            Potential DME:    Patient expects to discharge to: (P) House  Plan for transportation at discharge: (P) Self    Financial    Payor: [employer] MEDICARE / Plan: [employer] CHOICE-PPO MEDICARE / Product Type: *No Product type* /     Does insurance require precert for SNF: Yes    Potential assistance Purchasing Medications: (P) No  Meds-to-Beds request:        [id number] - [place], [place] - [address] - P [phone] - F [phone] [address] [facility] [address]  Phone: [phone] Fax: [phone]      Notes:    Factors facilitating achievement of predicted outcomes: Family support, Motivated, Cooperative, Pleasant, and Sense of humor    Barriers to discharge: Medical complications    Additional Case Management Notes: Referred to patient for d/c planning. Spoke to patient.  Patient is an 88 year old female admitted for dizziness.  Patient lives at home alone.  Patient reports she is independent in ADLs.  Patient drives.  Patient reports she has PCP and is able to obtain medications.  Patient currently denies d/c needs.      The Plan for Transition of Care is related to the following treatment goals of No admission diagnoses are documented for this encounter.    IF APPLICABLE: The Patient and/or patient representative Tomasa and her family were provided with a choice of provider and agrees with the discharge plan. Freedom of choice list with basic dialogue that supports the patient's individualized plan of care/goals and shares the quality data associated with the providers was provided to:     Patient Representative Name:       The Patient and/or Patient Representative Agree with the Discharge Plan?      DEB Clarke, [place]   Case Management Department  Ph: [phone] Fax: [phone]

## 2024-06-15 PROCEDURE — 6360000002 HC RX W HCPCS: Performed by: NURSE PRACTITIONER

## 2024-06-15 PROCEDURE — 96372 THER/PROPH/DIAG INJ SC/IM: CPT

## 2024-06-15 PROCEDURE — 6370000000 HC RX 637 (ALT 250 FOR IP): Performed by: NURSE PRACTITIONER

## 2024-06-15 PROCEDURE — G0378 HOSPITAL OBSERVATION PER HR: HCPCS

## 2024-06-15 PROCEDURE — 2580000003 HC RX 258: Performed by: NURSE PRACTITIONER

## 2024-06-15 RX ORDER — METOPROLOL SUCCINATE 25 MG/1
25 TABLET, EXTENDED RELEASE ORAL ONCE
Status: COMPLETED | OUTPATIENT
Start: 2024-06-15 | End: 2024-06-15

## 2024-06-15 RX ORDER — METOPROLOL SUCCINATE 50 MG/1
50 TABLET, EXTENDED RELEASE ORAL DAILY
Status: DISCONTINUED | OUTPATIENT
Start: 2024-06-16 | End: 2024-06-16

## 2024-06-15 RX ORDER — LISINOPRIL 5 MG/1
5 TABLET ORAL DAILY
Status: DISCONTINUED | OUTPATIENT
Start: 2024-06-15 | End: 2024-06-16 | Stop reason: HOSPADM

## 2024-06-15 RX ADMIN — ASPIRIN 81 MG 81 MG: 81 TABLET ORAL at 08:40

## 2024-06-15 RX ADMIN — METOPROLOL SUCCINATE 25 MG: 25 TABLET, EXTENDED RELEASE ORAL at 08:42

## 2024-06-15 RX ADMIN — BUPROPION HYDROCHLORIDE 150 MG: 150 TABLET, EXTENDED RELEASE ORAL at 21:01

## 2024-06-15 RX ADMIN — LISINOPRIL 5 MG: 5 TABLET ORAL at 13:20

## 2024-06-15 RX ADMIN — LORAZEPAM 0.5 MG: 0.5 TABLET ORAL at 08:40

## 2024-06-15 RX ADMIN — ENOXAPARIN SODIUM 40 MG: 100 INJECTION SUBCUTANEOUS at 08:40

## 2024-06-15 RX ADMIN — CETIRIZINE HYDROCHLORIDE 10 MG: 10 TABLET, FILM COATED ORAL at 08:40

## 2024-06-15 RX ADMIN — METOPROLOL SUCCINATE 25 MG: 25 TABLET, EXTENDED RELEASE ORAL at 11:10

## 2024-06-15 RX ADMIN — SODIUM CHLORIDE, PRESERVATIVE FREE 10 ML: 5 INJECTION INTRAVENOUS at 21:01

## 2024-06-15 RX ADMIN — AMLODIPINE BESYLATE 5 MG: 5 TABLET ORAL at 08:40

## 2024-06-15 RX ADMIN — ROSUVASTATIN 20 MG: 10 TABLET, FILM COATED ORAL at 21:01

## 2024-06-16 VITALS
HEIGHT: 64 IN | OXYGEN SATURATION: 95 % | TEMPERATURE: 97.5 F | WEIGHT: 156 LBS | HEART RATE: 71 BPM | RESPIRATION RATE: 16 BRPM | SYSTOLIC BLOOD PRESSURE: 137 MMHG | BODY MASS INDEX: 26.63 KG/M2 | DIASTOLIC BLOOD PRESSURE: 79 MMHG

## 2024-06-16 LAB
ANION GAP SERPL CALCULATED.3IONS-SCNC: 8 MMOL/L (ref 3–16)
BUN SERPL-MCNC: 20 MG/DL (ref 7–20)
CALCIUM SERPL-MCNC: 8.9 MG/DL (ref 8.3–10.6)
CHLORIDE SERPL-SCNC: 107 MMOL/L (ref 99–110)
CO2 SERPL-SCNC: 26 MMOL/L (ref 21–32)
CREAT SERPL-MCNC: 1 MG/DL (ref 0.6–1.2)
GFR SERPLBLD CREATININE-BSD FMLA CKD-EPI: 54 ML/MIN/{1.73_M2}
GLUCOSE SERPL-MCNC: 89 MG/DL (ref 70–99)
POTASSIUM SERPL-SCNC: 3.9 MMOL/L (ref 3.5–5.1)
SODIUM SERPL-SCNC: 141 MMOL/L (ref 136–145)

## 2024-06-16 PROCEDURE — 96372 THER/PROPH/DIAG INJ SC/IM: CPT

## 2024-06-16 PROCEDURE — 80048 BASIC METABOLIC PNL TOTAL CA: CPT

## 2024-06-16 PROCEDURE — 6370000000 HC RX 637 (ALT 250 FOR IP): Performed by: NURSE PRACTITIONER

## 2024-06-16 PROCEDURE — G0378 HOSPITAL OBSERVATION PER HR: HCPCS

## 2024-06-16 PROCEDURE — 2580000003 HC RX 258: Performed by: NURSE PRACTITIONER

## 2024-06-16 PROCEDURE — 6360000002 HC RX W HCPCS: Performed by: NURSE PRACTITIONER

## 2024-06-16 RX ORDER — METOPROLOL SUCCINATE 25 MG/1
25 TABLET, EXTENDED RELEASE ORAL DAILY
Status: DISCONTINUED | OUTPATIENT
Start: 2024-06-16 | End: 2024-06-16 | Stop reason: HOSPADM

## 2024-06-16 RX ORDER — LISINOPRIL 5 MG/1
5 TABLET ORAL DAILY
Qty: 30 TABLET | Refills: 3 | Status: SHIPPED | OUTPATIENT
Start: 2024-06-17

## 2024-06-16 RX ADMIN — ASPIRIN 81 MG 81 MG: 81 TABLET ORAL at 09:02

## 2024-06-16 RX ADMIN — METOPROLOL SUCCINATE 25 MG: 25 TABLET, EXTENDED RELEASE ORAL at 09:02

## 2024-06-16 RX ADMIN — LORAZEPAM 0.5 MG: 0.5 TABLET ORAL at 09:03

## 2024-06-16 RX ADMIN — CARBOXYMETHYLCELLULOSE SODIUM 1 DROP: 10 GEL OPHTHALMIC at 09:05

## 2024-06-16 RX ADMIN — CETIRIZINE HYDROCHLORIDE 10 MG: 10 TABLET, FILM COATED ORAL at 09:02

## 2024-06-16 RX ADMIN — SODIUM CHLORIDE, PRESERVATIVE FREE 10 ML: 5 INJECTION INTRAVENOUS at 09:03

## 2024-06-16 RX ADMIN — LISINOPRIL 5 MG: 5 TABLET ORAL at 09:02

## 2024-06-16 RX ADMIN — CHOLECALCIFEROL (VITAMIN D3) 10 MCG (400 UNIT) TABLET 400 UNITS: at 09:13

## 2024-06-16 RX ADMIN — ENOXAPARIN SODIUM 40 MG: 100 INJECTION SUBCUTANEOUS at 09:03

## 2024-06-16 RX ADMIN — AMLODIPINE BESYLATE 5 MG: 5 TABLET ORAL at 09:02

## 2024-06-16 ASSESSMENT — PAIN SCALES - GENERAL: PAINLEVEL_OUTOF10: 0

## 2024-06-16 NOTE — PROGRESS NOTES
Hospital Medicine Progress Note      Date of Admission: 6/13/2024  Hospital Day: 2    Chief Admission Complaint:      Hypertension (Reports feeling generally unwell over the last few weeks. States today her SBP was 190+, does not normally check her blood pressure. Has some dizziness and slight headache.)         Subjective:      Patient up in chair today and feeling well. No complaints. Granddaughter Sasha at bedside.     Presenting Admission History:       88 y.o. female who presented to [facility] with dizziness, right leg weakness.  PMHx significant for HTN, HLD, anxiety, peripheral neuropathy.  History was obtained from the patient and review of the EMR.  The patient tells me that she has been feeling unwell over the past few weeks and just feels weaker in general.  When she woke up this morning, she got up to make her bed and started feeling very lightheaded and like she was going to pass out.  Her symptoms improved somewhat with sitting.  However, due to her overwhelming weakness, she decided to come into the ED for further evaluation.  Stroke team was consulted who did not recommend TNK, but did recommend neurology consultation.  Neurology was consulted.  She denies any other symptoms during my visit today.  Orthostatic vitals are pending.  MRI brain is pending.  She will be admitted for further evaluation     Assessment/Plan:       Current Principal Problem:  Dizziness of unknown etiology     Dizziness, weakness, unclear etiology. Possibly 2/2 HTN urgency  - CT head with no acute intracranial abnormality.  Small vessel ischemic disease  - CTA head/neck with no flow-limiting stenosis seen of the cervical carotid/vertebral arteries.  Possible fibromuscular dysplasia  - MRI brain negative for acute process  - Orthostatic vitals negative  - Neurology consulted, appreciate their recommendations  - Telemetry monitoring     Hx of HLD, controlled with statin.  - Continue home statin  - Follow-up with PCP 
  Hospital Medicine Progress Note      Date of Admission: 6/13/2024  Hospital Day: 3    Chief Admission Complaint:      Hypertension (Reports feeling generally unwell over the last few weeks. States today her SBP was 190+, does not normally check her blood pressure. Has some dizziness and slight headache.)         Subjective:      Patient without compliant, eager to go home. Son and DIL at bedside.  Patient's BP is very elevated even on home medications. Discussed need to continue monitoring and adjusting medications until goal of < 140/90.    Presenting Admission History:       This is an 88 y.o. female who presented to [facility] with dizziness, right leg weakness.  PMHx significant for HTN, HLD, anxiety, peripheral neuropathy.  History was obtained from the patient and review of the EMR.  The patient tells me that she has been feeling unwell over the past few weeks and just feels weaker in general.  When she woke up this morning, she got up to make her bed and started feeling very lightheaded and like she was going to pass out.  Her symptoms improved somewhat with sitting.  However, due to her overwhelming weakness, she decided to come into the ED for further evaluation.  Stroke team was consulted who did not recommend TNK, but did recommend neurology consultation.  Neurology was consulted.  She denies any other symptoms during my visit today.  Orthostatic vitals are pending.  MRI brain is pending.  She will be admitted for further evaluation     Assessment/Plan:       Current Principal Problem:  Dizziness of unknown etiology     Dizziness, weakness, unclear etiology. Possibly 2/2 HTN urgency  - CT head with no acute intracranial abnormality.  Small vessel ischemic disease  - CTA head/neck with no flow-limiting stenosis seen of the cervical carotid/vertebral arteries.  Possible fibromuscular dysplasia  - MRI brain negative for acute process  - Orthostatic vitals negative  - Neurology consulted, appreciate 
4 Eyes Skin Assessment     NAME:  Tomasa Johnson  YOB: 1935  MEDICAL RECORD NUMBER:  [id number]    The patient is being assessed for  Admission    I agree that at least one RN has performed a thorough Head to Toe Skin Assessment on the patient. ALL assessment sites listed below have been assessed.      Areas assessed by both nurses:    Head, Face, Ears, Shoulders, Back, Chest, Arms, Elbows, Hands, Sacrum. Buttock, Coccyx, Ischium, Legs. Feet and Heels, and Under Medical Devices         Does the Patient have a Wound? No noted wound(s)       Navjot Prevention initiated by RN: Yes  Wound Care Orders initiated by RN: No    Pressure Injury (Stage 3,4, Unstageable, DTI, NWPT, and Complex wounds) if present, place Wound referral order by RN under : No    New Ostomies, if present place, Ostomy referral order under : No     Nurse 1 eSignature: Electronically signed by Alyssa Stark RN on 6/13/24 at 5:19 PM EDT    **SHARE this note so that the co-signing nurse can place an eSignature**    Nurse 2 eSignature: Electronically signed by Senia Hylton RN on 6/13/24 at 7:41 PM EDT   
MRI screening form reviewed with patient and completed at this time.   
Patient admitted to room 538 from ER.  Patient oriented to room, call light, bed rails, phone, lights and bathroom.  Patient instructed about the schedule of the day including: vital sign frequency, lab draws, possible tests, frequency of MD and staff rounds, including RN/MD rounding together at bedside, daily weights, and I &O's.  Patient instructed about prescribed diet, how to call dietary, and use television. Bed alarm in place, patient aware of placement and reason. Telemetry box 38 in place, patient aware of placement and reason.  Bed locked, in lowest position, side rails up 2/4, call light within reach.  Will continue to monitor.     
Patient discharged per wheel chair to private car. Family present.   
Physical Therapy  Discharge Summary    PT orders received, chart reviewed. Pt is independent with all mobility without AD per OT and has no acute PT needs at this time. PT to sign off. Please re-refer if needs change.  Thank you,  Ena Maurer, PT, DPT  
TODAY'S DATE:  6/13/2024      Current NIHSS 1      Discussed personal risk factors for Stroke/TIA with patient/family, and ways to reduce the risk for a recurrent stroke.     Patient's personal risk factors which were identified are:   []   Alcohol Abuse: check with your physician before any alcohol consumption.   []   Atrial fibrillation: may cause blood clots  []   Drug Abuse: Seek help, talk with your doctor  []   Clotting Disorder  []   Diabetes  []   Family history of stroke or heart disease  [x]   High Blood Pressure/Hypertension: work with your physician  [x]   High cholesterol: monitor cholesterol levels with your physician  []   Overweight/Obesity: work with your physician for your ideal body weight  [x]   Physical Inactivity: get regular exercise as directed by your physician  []   Personal history of previous TIA or stroke  [x]   Poor Diet: decrease salt (sodium) in your diet, follow diet directed by physician  []   Smoking: stop smoking      Reviewed the Following Education with Patient and/or Family:   - Signs and Symptoms of Stroke  - Personal risk factors   - How to activate EMS (911)   - Importance of Follow Up Appointments at Discharge   - Importance of Compliance with Medications Prescribed at Discharge  - Available community resources and stroke advocacy groups if needed    Patient and/or family member: verbalized understanding.     Stroke Education booklet given to patient/family (or verified, if given already), which reviews above information. yes         Electronically signed by Alyssa Stark RN on 6/13/2024 at 7:32 PM       
TODAY'S DATE:  6/14/2024      Current NIHSS 1      Discussed personal risk factors for Stroke/TIA with patient/family, and ways to reduce the risk for a recurrent stroke.     Patient's personal risk factors which were identified are:   []   Alcohol Abuse: check with your physician before any alcohol consumption.   []   Atrial fibrillation: may cause blood clots  []   Drug Abuse: Seek help, talk with your doctor  []   Clotting Disorder  []   Diabetes  []   Family history of stroke or heart disease  [x]   High Blood Pressure/Hypertension: work with your physician  [x]   High cholesterol: monitor cholesterol levels with your physician  []   Overweight/Obesity: work with your physician for your ideal body weight  []   Physical Inactivity: get regular exercise as directed by your physician  []   Personal history of previous TIA or stroke  []   Poor Diet: decrease salt (sodium) in your diet, follow diet directed by physician  []   Smoking: stop smoking      Reviewed the Following Education with Patient and/or Family:   - Signs and Symptoms of Stroke  - Personal risk factors   - How to activate EMS (911)   - Importance of Follow Up Appointments at Discharge   - Importance of Compliance with Medications Prescribed at Discharge  - Available community resources and stroke advocacy groups if needed    Patient and/or family member: verbalized understanding.     Stroke Education booklet given to patient/family (or verified, if given already), which reviews above information. yes         Electronically signed by Senia Mcneill RN on 6/14/2024 at 6:28 AM       
Tomasa Johnson  Neurology Follow-up  [facility]    Date of Service: 6/14/2024    Subjective:   CC: Follow up today regarding: Lightheadedness     Events noted. Chart and lab reviewed. Pt examined today sitting in chair. Pt denies any recurrence of symptoms. No acute findings on MRI, pt updated on results.       ROS : A 10-12 system review obtained and updated today and is unremarkable except as mentioned  in my interval history.     Medication, past medical history, social history, and family history reviewed.    Objective:  Exam:   Constitutional:   Vitals:    06/14/24 0304 06/14/24 0914 06/14/24 0955 06/14/24 1239   BP: 136/75 (!) 170/72  132/70   Pulse: 70 85  94   Resp: 16  18 17   Temp: 97.5 °F (36.4 °C)  98 °F (36.7 °C) 97.4 °F (36.3 °C)   TempSrc: Oral  Oral Oral   SpO2: 92% 97%  96%   Weight:       Height:         General appearance:  Normal development and appear in no acute distress.   Mental Status:   Oriented to person, place, problem, and time.    Memory: Good immediate recall.  Intact remote memory  Normal attention span and concentration.  Language: intact naming, repeating and fluency   Good fund of Knowledge.   Cranial Nerves:   II:   Pupils: equal, round, reactive to light  III,IV,VI: Extra Ocular Movements are intact. No nystagmus  V: Facial sensation is intact  VII: Mild R facial droop, this is baseline for her.  XII: Tongue movements are normal  Musculoskeletal: 5/5 in all 4 extremities.   Reflexes: Symmetric 2+ in both arms and legs.  Coordination: no pronator drift, no dysmetria with FNF. Normal REM.   Sensation: normal to all modalities in both arms and legs.  Gait/Posture: Sitting in bed at time of examination        Data:  LABS:   Lab Results   Component Value Date/Time     06/13/2024 08:41 AM    K 4.0 06/13/2024 08:41 AM     06/13/2024 08:41 AM    CO2 28 06/13/2024 08:41 AM    BUN 21 06/13/2024 08:41 AM    CREATININE 1.2 06/13/2024 08:41 AM    LABGLOM 44 06/13/2024 
Written discharge instructions given to patient and explained to her and her daughter.  Information about signs of stroke or MI explained and need for her to check her pulse before taking her Metoprolol . She states she has a bp monitor at home. Informed to call her dr if pulse is below 60 or bp below 100/60 before taking it. Verbalized understanding of the above. All questions answered to her satisfaction.   
Factors: don/doff socks while seated in chair  Toileting: Independent  Toileting Skilled Clinical Factors: standing to perform clothing management, seated on toilet for eduardo hygiene  Functional Mobility: Independent  Functional Mobility Skilled Clinical Factors: to/from bathroom and around room, no AD              Vision  Vision: Impaired  Vision Exceptions: Wears glasses for reading  Hearing  Hearing: Within functional limits  Cognition  Overall Cognitive Status: WFL  Orientation  Overall Orientation Status: Within Normal Limits  Orientation Level: Oriented X4                  Education Given To: Patient  Education Provided: Role of Therapy;Plan of Care;ADL Adaptive Strategies;Transfer Training;Energy Conservation;Mobility Training  Education Provided Comments: disease specific: role of OT, importance/benefits of mobility, d/c planning  Education Method: Verbal  Barriers to Learning: None  Education Outcome: Verbalized understanding;Demonstrated understanding  LUE AROM (degrees)  LUE AROM : WNL  Left Hand AROM (degrees)  Left Hand AROM: WNL  RUE AROM (degrees)  RUE AROM : WNL  Right Hand AROM (degrees)  Right Hand AROM: WNL              AM-PAC - ADL  AM-PAC Daily Activity - Inpatient   How much help is needed for putting on and taking off regular lower body clothing?: None  How much help is needed for bathing (which includes washing, rinsing, drying)?: None  How much help is needed for toileting (which includes using toilet, bedpan, or urinal)?: None  How much help is needed for putting on and taking off regular upper body clothing?: None  How much help is needed for taking care of personal grooming?: None  How much help for eating meals?: None  AM-PAC Inpatient Daily Activity Raw Score: 24  AM-PAC Inpatient ADL T-Scale Score : 57.54  ADL Inpatient CMS 0-100% Score: 0  ADL Inpatient CMS G-Code Modifier : CH      Goals  Short Term Goals  Time Frame for Short Term Goals: Short term goals to be met by 6/14  Short Term

## 2024-06-16 NOTE — PLAN OF CARE
Hypertension controled, No skin problems, able to ambulate on her own without dizziness. Back pain is small and she denies need for pain med.

## 2024-06-16 NOTE — DISCHARGE SUMMARY
right greater than left, which may represent fibromuscular dysplasia. VERTEBRAL ARTERIES: No significant stenosis seen of the vertebral arteries. Slightly beaded appearance of the left V3 segment, which may represent fibromuscular dysplasia. SOFT TISSUES: No focal consolidation within the visualized lung apices.  No acute abnormality within the visualized superior mediastinum. BONES: The osseous structures appear osteopenic.  No acute osseous abnormality.  Multilevel degenerative change of the cervical spine. CTA HEAD: ANTERIOR CIRCULATION: There is atherosclerosis of the internal carotid arteries bilaterally.  There is perhaps mild stenosis of the left supraclinoid ICA.  No significant stenosis of the right internal carotid artery.  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  No significant stenosis seen of the anterior cerebral or middle cerebral arteries. POSTERIOR CIRCULATION: There is a diminutive caliber of the basilar and vertebral arteries.  This is likely due to persistent fetal supply of the posterior cerebral arteries bilaterally.  No significant stenosis seen of the posterior cerebral arteries. OTHER: No dural venous sinus thrombosis on this non-dedicated study. BRAIN: No mass effect or midline shift.     1. No flow limiting stenosis seen of the cervical carotid/vertebral arteries. 2. There is a beaded appearance involving the cervical ICAs bilaterally as well as the left V3 segment, which may represent fibromuscular dysplasia. 3. Atherosclerosis contributes to mild stenosis of the left supraclinoid internal carotid artery. 4. Otherwise, no significant stenosis seen of the [id number]. 5. There is a diffusely diminutive caliber of the basilar and vertebral arteries likely due to persistent fetal supply of the PCAs bilaterally.     CT HEAD WO CONTRAST    Result Date: 6/13/2024  EXAMINATION: CT OF THE HEAD WITHOUT

## (undated) DEVICE — APPLICATOR PREP 26ML 0.7% IOD POVACRYLEX 74% ISO ALC ST

## (undated) DEVICE — UNIVERSAL BLOCK TRAY: Brand: MEDLINE INDUSTRIES, INC.

## (undated) DEVICE — SOLUTION IV IRRIG 500ML 0.9% SODIUM CHL 2F7123

## (undated) DEVICE — GAUZE,SPONGE,4"X4",16PLY,STRL,LF,10/TRAY: Brand: MEDLINE

## (undated) DEVICE — PACK COOL L W14XL6.5IN 3 LAYR CONSTR SFT CLP CLSR 4 TIE

## (undated) DEVICE — PENCIL ES L3M BTTN SWCH S STL HEX LOK BLDE ELECTRD HOLSTER

## (undated) DEVICE — CHLORAPREP 26ML ORANGE

## (undated) DEVICE — ELECTRODE NDL 2.8IN COAT VALLEYLAB

## (undated) DEVICE — SPLINT ORTH W3XL12IN LAYERED FBRGLS FOAM PD BRTH BK MOLD

## (undated) DEVICE — ELECTRODE PT RET AD L9FT HI MOIST COND ADH HYDRGEL CORDED

## (undated) DEVICE — TOWEL OR BLUEE 16X26IN ST 8 PACK ORB08 16X26ORTWL

## (undated) DEVICE — CATHETER IV 20GA L1.25IN PNK FEP SFTY STR HUB RADPQ DISP

## (undated) DEVICE — SET GRAV VENT NVENT CK VLV 3 NDL FREE PRT 10 GTT

## (undated) DEVICE — ALCOHOL RUBBING 16OZ 70% ISO

## (undated) DEVICE — STERILE POLYISOPRENE POWDER-FREE SURGICAL GLOVES: Brand: PROTEXIS

## (undated) DEVICE — GLOVE SURG SZ 75 L12IN FNGR THK94MIL STD WHT LTX FREE

## (undated) DEVICE — 3M™ TEGADERM™ TRANSPARENT FILM DRESSING FRAME STYLE, 1624W, 2-3/8 IN X 2-3/4 IN (6 CM X 7 CM), 100/CT 4CT/CASE: Brand: 3M™ TEGADERM™

## (undated) DEVICE — MATERIAL PD W2XL4YD ST COT CAST SPLNT NONADHESIVE SPEC

## (undated) DEVICE — SOLUTION IV 1000ML LAC RINGERS PH 6.5 INJ USP VIAFLX PLAS

## (undated) DEVICE — Device

## (undated) DEVICE — GOWN,SIRUS,NON REINFRCD,LARGE,SET IN SL: Brand: MEDLINE

## (undated) DEVICE — COMFO-TEX ELASTIC BANDAGE LATEX FREE, 3INX5YD: Brand: COMFO-TEX™

## (undated) DEVICE — SET ADMIN PRIMING 7ML L30IN 7.35LB 20 GTT 2ND RLER CLMP

## (undated) DEVICE — SUTURE ETHLN SZ 4-0 L18IN NONABSORBABLE BLK L19MM PS-2 3/8 1667H

## (undated) DEVICE — ZIMMER® STERILE DISPOSABLE TOURNIQUET CUFF WITH PLC, DUAL PORT, SINGLE BLADDER, 18 IN. (46 CM)